# Patient Record
Sex: FEMALE | Race: WHITE | NOT HISPANIC OR LATINO | Employment: OTHER | URBAN - METROPOLITAN AREA
[De-identification: names, ages, dates, MRNs, and addresses within clinical notes are randomized per-mention and may not be internally consistent; named-entity substitution may affect disease eponyms.]

---

## 2017-06-20 ENCOUNTER — APPOINTMENT (OUTPATIENT)
Dept: PREADMISSION TESTING | Facility: HOSPITAL | Age: 82
End: 2017-06-20

## 2017-06-20 VITALS
DIASTOLIC BLOOD PRESSURE: 78 MMHG | BODY MASS INDEX: 30.4 KG/M2 | OXYGEN SATURATION: 96 % | HEART RATE: 74 BPM | SYSTOLIC BLOOD PRESSURE: 138 MMHG | TEMPERATURE: 98.4 F | RESPIRATION RATE: 16 BRPM | HEIGHT: 64 IN | WEIGHT: 178.1 LBS

## 2017-06-20 LAB
ANION GAP SERPL CALCULATED.3IONS-SCNC: 13.4 MMOL/L
BUN BLD-MCNC: 31 MG/DL (ref 8–23)
BUN/CREAT SERPL: 21.8 (ref 7–25)
CALCIUM SPEC-SCNC: 10.4 MG/DL (ref 8.6–10.5)
CHLORIDE SERPL-SCNC: 97 MMOL/L (ref 98–107)
CO2 SERPL-SCNC: 25.6 MMOL/L (ref 22–29)
CREAT BLD-MCNC: 1.42 MG/DL (ref 0.57–1)
DEPRECATED RDW RBC AUTO: 62.4 FL (ref 37–54)
ERYTHROCYTE [DISTWIDTH] IN BLOOD BY AUTOMATED COUNT: 20 % (ref 11.7–13)
GFR SERPL CREATININE-BSD FRML MDRD: 35 ML/MIN/1.73
GLUCOSE BLD-MCNC: 173 MG/DL (ref 65–99)
HCT VFR BLD AUTO: 39.5 % (ref 35.6–45.5)
HGB BLD-MCNC: 12.3 G/DL (ref 11.9–15.5)
MCH RBC QN AUTO: 26.7 PG (ref 26.9–32)
MCHC RBC AUTO-ENTMCNC: 31.1 G/DL (ref 32.4–36.3)
MCV RBC AUTO: 85.7 FL (ref 80.5–98.2)
PLATELET # BLD AUTO: 188 10*3/MM3 (ref 140–500)
PMV BLD AUTO: 9.1 FL (ref 6–12)
POTASSIUM BLD-SCNC: 4.9 MMOL/L (ref 3.5–5.2)
RBC # BLD AUTO: 4.61 10*6/MM3 (ref 3.9–5.2)
SODIUM BLD-SCNC: 136 MMOL/L (ref 136–145)
WBC NRBC COR # BLD: 6.03 10*3/MM3 (ref 4.5–10.7)

## 2017-06-20 PROCEDURE — 93010 ELECTROCARDIOGRAM REPORT: CPT | Performed by: INTERNAL MEDICINE

## 2017-06-20 PROCEDURE — 36415 COLL VENOUS BLD VENIPUNCTURE: CPT

## 2017-06-20 PROCEDURE — 85027 COMPLETE CBC AUTOMATED: CPT | Performed by: OPHTHALMOLOGY

## 2017-06-20 PROCEDURE — 93005 ELECTROCARDIOGRAM TRACING: CPT

## 2017-06-20 PROCEDURE — 80048 BASIC METABOLIC PNL TOTAL CA: CPT | Performed by: OPHTHALMOLOGY

## 2017-06-20 RX ORDER — LORAZEPAM 1 MG/1
1 TABLET ORAL NIGHTLY
Status: ON HOLD | COMMUNITY
End: 2021-12-15

## 2017-06-20 RX ORDER — ROSUVASTATIN CALCIUM 20 MG/1
20 TABLET, COATED ORAL NIGHTLY
Status: ON HOLD | COMMUNITY
End: 2021-12-15

## 2017-06-20 RX ORDER — GABAPENTIN 300 MG/1
300 CAPSULE ORAL 3 TIMES DAILY
COMMUNITY

## 2017-06-20 RX ORDER — LISINOPRIL 2.5 MG/1
2.5 TABLET ORAL DAILY
Status: ON HOLD | COMMUNITY
End: 2023-01-01

## 2017-06-20 RX ORDER — OMEPRAZOLE 40 MG/1
40 CAPSULE, DELAYED RELEASE ORAL DAILY
COMMUNITY
End: 2021-06-09 | Stop reason: SDUPTHER

## 2017-06-20 RX ORDER — CLOPIDOGREL BISULFATE 75 MG/1
75 TABLET ORAL DAILY
COMMUNITY
End: 2017-06-27 | Stop reason: HOSPADM

## 2017-06-20 RX ORDER — FUROSEMIDE 40 MG/1
40 TABLET ORAL DAILY PRN
COMMUNITY

## 2017-06-20 RX ORDER — METOPROLOL SUCCINATE 50 MG/1
50 TABLET, EXTENDED RELEASE ORAL NIGHTLY
Status: ON HOLD | COMMUNITY
End: 2021-12-15

## 2017-06-20 RX ORDER — ALBUTEROL SULFATE 2.5 MG/3ML
2.5 SOLUTION RESPIRATORY (INHALATION) EVERY 4 HOURS PRN
Status: ON HOLD | COMMUNITY
End: 2021-12-15

## 2017-06-20 RX ORDER — FOLIC ACID 1 MG/1
1 TABLET ORAL DAILY
COMMUNITY

## 2017-06-20 NOTE — DISCHARGE INSTRUCTIONS
Take the following medications the morning of surgery with a small sip of water: OMEPRAZOLE    ARRIVAL TIME  07:30        General Instructions:  • Do not eat solid food after midnight the night before surgery.  • You may drink clear liquids day of surgery but must stop at least one hour before your hospital arrival time.  • It is beneficial for you to have a clear drink that contains carbohydrates the day of surgery.  We suggest a 20 ounce bottle of Gatorade or Powerade for non-diabetic patients or a 20 ounce bottle of G2 or Powerade Zero for diabetic patients. (Pediatric patients, are not advised to drink a 20 ounce carbohydrate drink)    Clear liquids are liquids you can see through.  Nothing red in color.     Plain water                               Sports drinks  Sodas                                   Gelatin (Jell-O)  Fruit juices without pulp such as white grape juice and apple juice  Popsicles that contain no fruit or yogurt  Tea or coffee (no cream or milk added)  Gatorade / Powerade  G2 / Powerade Zero    • Do not smoke, use chewing tobacco or drink alcohol the day of surgery.   • Bring any papers given to you in the doctor’s office.  • Wear clean comfortable clothes and socks.  • Do not wear contact lenses or make-up.  Bring a case for your glasses.   • Leave all other valuables and jewelry at home.          Preventing a Surgical Site Infection:  • For 2 to 3 days before surgery, avoid shaving with a razor because the razor can irritate skin and make it easier to develop an infection.  • The night prior to surgery sleep in a clean bed with clean clothing.  Do not allow pets to sleep with you.  • Shower on the morning of surgery using a fresh bar of anti-bacterial soap (such as Dial) and clean washcloth.  Dry with a clean towel and dress in clean clothing.  • Ask your surgeon if you will be receiving antibiotics prior to surgery.  • Make sure you, your family, and all healthcare providers clean their  hands with soap and water or an alcohol based hand  before caring for you or your wound.    Day of surgery:  Upon arrival, a Pre-op nurse and Anesthesiologist will review your health history, obtain vital signs, and answer questions you may have.  The only belongings needed at this time will be your home medications and if applicable your C-PAP/BI-PAP machine.  If you are staying overnight your family can leave the rest of your belongings in the car and bring them to your room later.  A Pre-op nurse will start an IV and you may receive medication in preparation for surgery, including something to help you relax.  Your family will be able to see you in the Pre-op area.  While you are in surgery your family should notify the waiting room  if they leave the waiting room area and provide a contact phone number.    Please be aware that surgery does come with discomfort.  We want to make every effort to control your discomfort so please discuss any uncontrolled symptoms with your nurse.   Your doctor will most likely have prescribed pain medications.      If you are going home after surgery you will receive individualized written care instructions before being discharged.  A responsible adult must drive you to and from the hospital on the day of your surgery and stay with you for 24 hours.    If you are staying overnight following surgery, you will be transported to your hospital room following the recovery period.  [facility] has all private rooms.    If you have any questions please call Pre-Admission Testing at [phone].  Deductibles and co-payments are collected on the day of service. Please be prepared to pay the required co-pay, deductible or deposit on the day of service as defined by your plan.

## 2017-06-27 ENCOUNTER — ANESTHESIA EVENT (OUTPATIENT)
Dept: PERIOP | Facility: HOSPITAL | Age: 82
End: 2017-06-27

## 2017-06-27 ENCOUNTER — HOSPITAL ENCOUNTER (OUTPATIENT)
Facility: HOSPITAL | Age: 82
Setting detail: HOSPITAL OUTPATIENT SURGERY
Discharge: HOME | End: 2017-06-27
Attending: OPHTHALMOLOGY | Admitting: OPHTHALMOLOGY

## 2017-06-27 ENCOUNTER — ANESTHESIA (OUTPATIENT)
Dept: PERIOP | Facility: HOSPITAL | Age: 82
End: 2017-06-27

## 2017-06-27 VITALS
BODY MASS INDEX: 30.61 KG/M2 | WEIGHT: 178.31 LBS | TEMPERATURE: 96.9 F | RESPIRATION RATE: 16 BRPM | DIASTOLIC BLOOD PRESSURE: 52 MMHG | HEART RATE: 71 BPM | OXYGEN SATURATION: 96 % | SYSTOLIC BLOOD PRESSURE: 139 MMHG

## 2017-06-27 LAB
GLUCOSE BLDC GLUCOMTR-MCNC: 203 MG/DL (ref 70–130)
GLUCOSE BLDC GLUCOMTR-MCNC: 229 MG/DL (ref 70–130)

## 2017-06-27 PROCEDURE — 25010000002 MIDAZOLAM PER 1 MG: Performed by: ANESTHESIOLOGY

## 2017-06-27 PROCEDURE — 82962 GLUCOSE BLOOD TEST: CPT

## 2017-06-27 PROCEDURE — 25010000002 PROPOFOL 1000 MG/ML EMULSION: Performed by: NURSE ANESTHETIST, CERTIFIED REGISTERED

## 2017-06-27 PROCEDURE — 25010000002 PROPOFOL 10 MG/ML EMULSION: Performed by: NURSE ANESTHETIST, CERTIFIED REGISTERED

## 2017-06-27 RX ORDER — HYDRALAZINE HYDROCHLORIDE 20 MG/ML
5 INJECTION INTRAMUSCULAR; INTRAVENOUS
Status: DISCONTINUED | OUTPATIENT
Start: 2017-06-27 | End: 2017-06-27 | Stop reason: HOSPADM

## 2017-06-27 RX ORDER — SODIUM CHLORIDE, SODIUM LACTATE, POTASSIUM CHLORIDE, CALCIUM CHLORIDE 600; 310; 30; 20 MG/100ML; MG/100ML; MG/100ML; MG/100ML
100 INJECTION, SOLUTION INTRAVENOUS CONTINUOUS
Status: DISCONTINUED | OUTPATIENT
Start: 2017-06-27 | End: 2017-06-27 | Stop reason: HOSPADM

## 2017-06-27 RX ORDER — SODIUM CHLORIDE 0.9 % (FLUSH) 0.9 %
1-10 SYRINGE (ML) INJECTION AS NEEDED
Status: DISCONTINUED | OUTPATIENT
Start: 2017-06-27 | End: 2017-06-27 | Stop reason: HOSPADM

## 2017-06-27 RX ORDER — PROMETHAZINE HYDROCHLORIDE 25 MG/1
12.5 TABLET ORAL ONCE AS NEEDED
Status: DISCONTINUED | OUTPATIENT
Start: 2017-06-27 | End: 2017-06-27 | Stop reason: HOSPADM

## 2017-06-27 RX ORDER — HYDROCODONE BITARTRATE AND ACETAMINOPHEN 7.5; 325 MG/1; MG/1
1 TABLET ORAL ONCE AS NEEDED
Status: COMPLETED | OUTPATIENT
Start: 2017-06-27 | End: 2017-06-27

## 2017-06-27 RX ORDER — DIPHENHYDRAMINE HYDROCHLORIDE 50 MG/ML
12.5 INJECTION INTRAMUSCULAR; INTRAVENOUS
Status: DISCONTINUED | OUTPATIENT
Start: 2017-06-27 | End: 2017-06-27 | Stop reason: HOSPADM

## 2017-06-27 RX ORDER — NALOXONE HCL 0.4 MG/ML
0.2 VIAL (ML) INJECTION AS NEEDED
Status: DISCONTINUED | OUTPATIENT
Start: 2017-06-27 | End: 2017-06-27 | Stop reason: HOSPADM

## 2017-06-27 RX ORDER — FENTANYL CITRATE 50 UG/ML
50 INJECTION, SOLUTION INTRAMUSCULAR; INTRAVENOUS
Status: DISCONTINUED | OUTPATIENT
Start: 2017-06-27 | End: 2017-06-27 | Stop reason: HOSPADM

## 2017-06-27 RX ORDER — FLUMAZENIL 0.1 MG/ML
0.2 INJECTION INTRAVENOUS AS NEEDED
Status: DISCONTINUED | OUTPATIENT
Start: 2017-06-27 | End: 2017-06-27 | Stop reason: HOSPADM

## 2017-06-27 RX ORDER — ONDANSETRON 2 MG/ML
4 INJECTION INTRAMUSCULAR; INTRAVENOUS ONCE AS NEEDED
Status: DISCONTINUED | OUTPATIENT
Start: 2017-06-27 | End: 2017-06-27 | Stop reason: HOSPADM

## 2017-06-27 RX ORDER — MIDAZOLAM HYDROCHLORIDE 1 MG/ML
1 INJECTION INTRAMUSCULAR; INTRAVENOUS
Status: DISCONTINUED | OUTPATIENT
Start: 2017-06-27 | End: 2017-06-27 | Stop reason: HOSPADM

## 2017-06-27 RX ORDER — ERYTHROMYCIN 5 MG/G
OINTMENT OPHTHALMIC AS NEEDED
Status: DISCONTINUED | OUTPATIENT
Start: 2017-06-27 | End: 2017-06-27 | Stop reason: HOSPADM

## 2017-06-27 RX ORDER — PROMETHAZINE HYDROCHLORIDE 25 MG/1
25 TABLET ORAL ONCE AS NEEDED
Status: DISCONTINUED | OUTPATIENT
Start: 2017-06-27 | End: 2017-06-27 | Stop reason: HOSPADM

## 2017-06-27 RX ORDER — ERYTHROMYCIN 5 MG/G
OINTMENT OPHTHALMIC
Qty: 3.5 G | Refills: 1 | Status: ON HOLD | OUTPATIENT
Start: 2017-06-27 | End: 2021-12-15

## 2017-06-27 RX ORDER — OXYCODONE AND ACETAMINOPHEN 7.5; 325 MG/1; MG/1
1 TABLET ORAL ONCE AS NEEDED
Status: DISCONTINUED | OUTPATIENT
Start: 2017-06-27 | End: 2017-06-27 | Stop reason: HOSPADM

## 2017-06-27 RX ORDER — SODIUM CHLORIDE, SODIUM LACTATE, POTASSIUM CHLORIDE, CALCIUM CHLORIDE 600; 310; 30; 20 MG/100ML; MG/100ML; MG/100ML; MG/100ML
9 INJECTION, SOLUTION INTRAVENOUS CONTINUOUS
Status: DISCONTINUED | OUTPATIENT
Start: 2017-06-27 | End: 2017-06-27 | Stop reason: HOSPADM

## 2017-06-27 RX ORDER — HYDROMORPHONE HYDROCHLORIDE 1 MG/ML
0.5 INJECTION, SOLUTION INTRAMUSCULAR; INTRAVENOUS; SUBCUTANEOUS
Status: DISCONTINUED | OUTPATIENT
Start: 2017-06-27 | End: 2017-06-27 | Stop reason: HOSPADM

## 2017-06-27 RX ORDER — ALBUTEROL SULFATE 2.5 MG/3ML
2.5 SOLUTION RESPIRATORY (INHALATION) ONCE AS NEEDED
Status: DISCONTINUED | OUTPATIENT
Start: 2017-06-27 | End: 2017-06-27 | Stop reason: HOSPADM

## 2017-06-27 RX ORDER — PROMETHAZINE HYDROCHLORIDE 25 MG/ML
5 INJECTION, SOLUTION INTRAMUSCULAR; INTRAVENOUS
Status: DISCONTINUED | OUTPATIENT
Start: 2017-06-27 | End: 2017-06-27 | Stop reason: HOSPADM

## 2017-06-27 RX ORDER — FAMOTIDINE 10 MG/ML
20 INJECTION, SOLUTION INTRAVENOUS ONCE
Status: COMPLETED | OUTPATIENT
Start: 2017-06-27 | End: 2017-06-27

## 2017-06-27 RX ORDER — HYDROCODONE BITARTRATE AND ACETAMINOPHEN 5; 325 MG/1; MG/1
1 TABLET ORAL EVERY 6 HOURS PRN
Qty: 15 TABLET | Refills: 0 | Status: SHIPPED | OUTPATIENT
Start: 2017-06-27 | End: 2017-09-06

## 2017-06-27 RX ORDER — PROMETHAZINE HYDROCHLORIDE 25 MG/ML
12.5 INJECTION, SOLUTION INTRAMUSCULAR; INTRAVENOUS ONCE AS NEEDED
Status: DISCONTINUED | OUTPATIENT
Start: 2017-06-27 | End: 2017-06-27 | Stop reason: HOSPADM

## 2017-06-27 RX ORDER — EPHEDRINE SULFATE 50 MG/ML
5 INJECTION, SOLUTION INTRAVENOUS ONCE AS NEEDED
Status: DISCONTINUED | OUTPATIENT
Start: 2017-06-27 | End: 2017-06-27 | Stop reason: HOSPADM

## 2017-06-27 RX ORDER — LIDOCAINE HYDROCHLORIDE 20 MG/ML
INJECTION, SOLUTION INFILTRATION; PERINEURAL AS NEEDED
Status: DISCONTINUED | OUTPATIENT
Start: 2017-06-27 | End: 2017-06-27 | Stop reason: SURG

## 2017-06-27 RX ORDER — MIDAZOLAM HYDROCHLORIDE 1 MG/ML
2 INJECTION INTRAMUSCULAR; INTRAVENOUS
Status: DISCONTINUED | OUTPATIENT
Start: 2017-06-27 | End: 2017-06-27 | Stop reason: HOSPADM

## 2017-06-27 RX ORDER — LABETALOL HYDROCHLORIDE 5 MG/ML
5 INJECTION, SOLUTION INTRAVENOUS
Status: DISCONTINUED | OUTPATIENT
Start: 2017-06-27 | End: 2017-06-27 | Stop reason: HOSPADM

## 2017-06-27 RX ORDER — PROPOFOL 10 MG/ML
VIAL (ML) INTRAVENOUS AS NEEDED
Status: DISCONTINUED | OUTPATIENT
Start: 2017-06-27 | End: 2017-06-27 | Stop reason: SURG

## 2017-06-27 RX ORDER — PROMETHAZINE HYDROCHLORIDE 25 MG/1
25 SUPPOSITORY RECTAL ONCE AS NEEDED
Status: DISCONTINUED | OUTPATIENT
Start: 2017-06-27 | End: 2017-06-27 | Stop reason: HOSPADM

## 2017-06-27 RX ADMIN — FAMOTIDINE 20 MG: 10 INJECTION, SOLUTION INTRAVENOUS at 08:00

## 2017-06-27 RX ADMIN — MIDAZOLAM 1 MG: 1 INJECTION INTRAMUSCULAR; INTRAVENOUS at 08:01

## 2017-06-27 RX ADMIN — HYDROCODONE BITARTRATE AND ACETAMINOPHEN 1 TABLET: 7.5; 325 TABLET ORAL at 10:42

## 2017-06-27 RX ADMIN — PROPOFOL 100 MG: 10 INJECTION, EMULSION INTRAVENOUS at 08:37

## 2017-06-27 RX ADMIN — PROPOFOL 200 MCG/KG/MIN: 10 INJECTION, EMULSION INTRAVENOUS at 08:38

## 2017-06-27 RX ADMIN — LIDOCAINE HYDROCHLORIDE 40 MG: 20 INJECTION, SOLUTION INFILTRATION; PERINEURAL at 08:37

## 2017-06-27 RX ADMIN — SODIUM CHLORIDE, POTASSIUM CHLORIDE, SODIUM LACTATE AND CALCIUM CHLORIDE 9 ML/HR: 600; 310; 30; 20 INJECTION, SOLUTION INTRAVENOUS at 07:46

## 2017-06-27 NOTE — H&P
History & Physical       Patient: Laury Palacios    Date of Admission: 6/27/2017  7:14 AM    YOB: 1930    Medical Record Number: [id number]      Chief Complaints: Left lower eyelid cicatricial ectropion      History of Present Illness: 87 y.o. female presents with Left lower eyelid cicatricial ectropion. No new meds/health problems since office visit      Allergies:   Allergies   Allergen Reactions   • Ciprofloxacin Rash       10 point review of systems negative, except pertaining to the HPI    Medications:   Home Medications:  No current facility-administered medications on file prior to encounter.      No current outpatient prescriptions on file prior to encounter.     Current Medications:  Scheduled Meds:   Continuous Infusions:  lactated ringers 9 mL/hr Last Rate: 9 mL/hr (06/27/17 0746)     PRN Meds:.•  fentanyl  •  midazolam **OR** midazolam  •  sodium chloride    Past Medical History:   Diagnosis Date   • Acid reflux    • Arthritis    • Asthma    • Chronic renal failure    • DDD (degenerative disc disease), lumbar    • Diabetes mellitus    • Ectropion of left eye    • Hyperlipidemia    • Hypertension    • Long-term current use of insulin for diabetes mellitus    • Melanoma    • Peripheral neuropathy    • Spinal stenosis    • TIA (transient ischemic attack)         Past Surgical History:   Procedure Laterality Date   • CATARACT EXTRACTION     • CHOLECYSTECTOMY          Social History     Occupational History   • Not on file.     Social History Main Topics   • Smoking status: Former Smoker   • Smokeless tobacco: Never Used      Comment: QUIT 25 YEARS AGO   • Alcohol use No   • Drug use: No   • Sexual activity: Not on file    Social History     Social History Narrative        Family History   Problem Relation Age of Onset   • Malig Hyperthermia Neg Hx            Physical Exam   Constitutional: Alert, cooperative, in no acute distress    Head: Normocephalic.   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   Cardiovascular: Normal rate.    Pulmonary/Chest: Effort normal.   Neurological: Alert.   Skin: Skin is warm.   Psychiatric: Normal mood and affect.       Assessment/Plan:  The patient voiced understanding of the risks, benefits, and alternative forms of treatment that were discussed and the patient consents to proceed with LEFT LOWER LID CICATRICIAL ECTROPION  FULL THICKNESS SKIN GRAFT WITH FROST SUTURE.       Prabhakar Hurd MD

## 2017-06-27 NOTE — ANESTHESIA PROCEDURE NOTES
Airway  Urgency: elective    Airway not difficult    General Information and Staff    Patient location during procedure: OR  Anesthesiologist: EREN GRAY  CRNA: NAV PARTIDA    Indications and Patient Condition  Indications for airway management: airway protection    Preoxygenated: yes  MILS maintained throughout  Mask difficulty assessment: 0 - not attempted    Final Airway Details  Final airway type: supraglottic airway      Successful airway: classic      Number of attempts at approach: 1    Additional Comments  Placed with ease. Vent with ease. Teeth as pre-op. Secured to face.

## 2017-06-27 NOTE — PLAN OF CARE
Problem: Patient Care Overview (Adult)  Goal: Plan of Care Review  Outcome: Ongoing (interventions implemented as appropriate)    06/27/17 1033   Coping/Psychosocial Response Interventions   Plan Of Care Reviewed With patient   Patient Care Overview   Progress improving       Goal: Adult Individualization and Mutuality  Outcome: Ongoing (interventions implemented as appropriate)  Goal: Discharge Needs Assessment  Outcome: Ongoing (interventions implemented as appropriate)    06/27/17 1033   Discharge Needs Assessment   Concerns To Be Addressed no discharge needs identified

## 2017-06-27 NOTE — ANESTHESIA PREPROCEDURE EVALUATION
Anesthesia Evaluation     Patient summary reviewed and Nursing notes reviewed   NPO Solid Status: > 8 hours  NPO Liquid Status: > 2 hours     Airway   Mallampati: II  no difficulty expected  Dental    (+) edentulous and upper dentures    Pulmonary     breath sounds clear to auscultation  (+) a smoker, asthma,   Cardiovascular     ECG reviewed  Rhythm: regular  Rate: normal    (+) hypertension, hyperlipidemia      Neuro/Psych  (+) TIA, numbness,    GI/Hepatic/Renal/Endo    (+)  GERD, renal disease, diabetes mellitus type 1,     Musculoskeletal     (+) back pain,   Abdominal    Substance History - negative use     OB/GYN negative ob/gyn ROS         Other   (+) arthritis   history of cancer                                Anesthesia Plan    ASA 3     general     intravenous induction   Anesthetic plan and risks discussed with patient.    Plan discussed with CRNA.    BP up-give Lisinopril this am

## 2017-06-27 NOTE — OP NOTE
OPERATIVE NOTE    Patient Identification:  Name: Laury Palacios  Age: 87 y.o.  Sex: female  :  1930  MRN: [id number]                                               Preoperative diagnosis: Left lower lid cicatricial ectropion   Postoperative diagnosis: same  Procedure: Left lower lid cicatricial ectropion repair with full thickness skin graft and frost suture  Surgeon: Dr. Heath Patterson  who was present and scrubbed throughout all critical portions of the operation  Assistants: Prabhakar Hurd MD  Anesthesia: General  EBL: less than 50cc    Description of the procedure:     The patient was taken to the operating room and placed on the table in the supine position, where anesthesia was induced. 2% lidocaine with epinephrine and 0.5% marcaine in a 1:1 fashion was injected over the surgical site, and the patient was prepped and draped in the usual manner for orbitofacial surgery.     Corneal protectors were placed in both eyes.     A 15 Bard-Delfino blade incision was made at the Left lateral canthus, and sharp dissection was carried to the lateral orbital rim. A second incision was made 2 mm below the eyelash margin, across the lateral 2/3 of the lower eyelid. The prior skin graft with cicatricial tissue and multiple inclusion cysts was excised. The excision allowed the lower lid to achieve an improved position. The lower lid was noted to have moderate laxity. The lower lid was advanced superiorly and temporally, and the inferior ramus of the lateral canthal tendon was identified and severed with sharp dissection. A full-thickness en bloc excision of the lateral aspect of the eyelid margin was carried out with sharp dissection. The cut edge of the tarsal plate was anchored to the lateral orbital rim periosteum with a 5-0 vicryl suture.     A full-thickness skin shortage of the lower eyelid was observed. A full-thickness skin graft was then harvested from  The right upper eyelid via a blepharoplasty skin excision  with care to ensure that the amount excised was appropriate to prevent lagophthalmos on the right. The donor site was closed with 5-0 fast absorbing suture. A 4-0 frost suture was placed through the left lower lid and held on traction superiorly. The full-thickness skin graft was cut to fit the lower eyelid defect with a slight overcorrection to allow for graft contraction. The graft was sutured around the entire perimeter of the graft with 5-0 fast absorbing suture.     The corneal protectors were removed and antibiotic ophthalmic ointment was placed over the surgical site. The eyelid margin was held superiorly with a double-armed 4-0 Silk through the lower and upper eyelid and  anchored to the forehead to provide continuous upward traction on the eyelid.  A moderate pressure eyepad dressing was applied to the skin graft.    The patient was then awakened and taken from the operating room in good condition, having tolerated the procedure well. There were no complications, and the estimated blood loss was less than 50 cc.

## 2017-06-27 NOTE — ANESTHESIA POSTPROCEDURE EVALUATION
Patient: Laury Palacios    Procedure Summary     Date Anesthesia Start Anesthesia Stop Room / Location    06/27/17 0830 0954  [facility]  /  [facility]       Procedure Diagnosis Surgeon Provider    LEFT LOWER LID CICATRICIAL ECTROPION  FULL THICKNESS SKIN GRAFT WITH FROST SUTURE (Left Eye) No diagnosis on file. MD Alonso Dick MD          Anesthesia Type: general  Last vitals  /46 (06/27/17 1045)    Temp 36.1 °C (96.9 °F) (06/27/17 1030)    Pulse 78 (06/27/17 1045)   Resp 16 (06/27/17 1045)    SpO2 94 % (06/27/17 1030)      Post Anesthesia Care and Evaluation    Patient location during evaluation: bedside  Patient participation: complete - patient participated  Level of consciousness: awake  Pain management: adequate  Airway patency: patent  Anesthetic complications: No anesthetic complications    Cardiovascular status: acceptable  Respiratory status: acceptable  Hydration status: acceptable

## 2017-06-27 NOTE — PLAN OF CARE
Problem: Perioperative Period (Adult)  Goal: Signs and Symptoms of Listed Potential Problems Will be Absent or Manageable (Perioperative Period)  Outcome: Ongoing (interventions implemented as appropriate)    06/27/17 1033   Perioperative Period   Problems Assessed (Perioperative Period) pain;hemorrhage;hypothermia;hypoxia/hypoxemia   Problems Present (Perioperative Period) none

## 2017-08-31 ENCOUNTER — CONVERSION ENCOUNTER (OUTPATIENT)
Dept: MAMMOGRAPHY | Facility: HOSPITAL | Age: 82
End: 2017-08-31

## 2017-09-06 ENCOUNTER — APPOINTMENT (OUTPATIENT)
Dept: PREADMISSION TESTING | Facility: HOSPITAL | Age: 82
End: 2017-09-06

## 2017-09-06 VITALS
SYSTOLIC BLOOD PRESSURE: 134 MMHG | DIASTOLIC BLOOD PRESSURE: 65 MMHG | HEIGHT: 65 IN | WEIGHT: 176.8 LBS | OXYGEN SATURATION: 100 % | BODY MASS INDEX: 29.46 KG/M2 | TEMPERATURE: 98.2 F | HEART RATE: 82 BPM | RESPIRATION RATE: 20 BRPM

## 2017-09-06 LAB
ANION GAP SERPL CALCULATED.3IONS-SCNC: 14.4 MMOL/L
BUN BLD-MCNC: 32 MG/DL (ref 8–23)
BUN/CREAT SERPL: 18.9 (ref 7–25)
CALCIUM SPEC-SCNC: 9.6 MG/DL (ref 8.6–10.5)
CHLORIDE SERPL-SCNC: 98 MMOL/L (ref 98–107)
CO2 SERPL-SCNC: 26.6 MMOL/L (ref 22–29)
CREAT BLD-MCNC: 1.69 MG/DL (ref 0.57–1)
DEPRECATED RDW RBC AUTO: 50.2 FL (ref 37–54)
ERYTHROCYTE [DISTWIDTH] IN BLOOD BY AUTOMATED COUNT: 15.8 % (ref 11.7–13)
GFR SERPL CREATININE-BSD FRML MDRD: 29 ML/MIN/1.73
GLUCOSE BLD-MCNC: 243 MG/DL (ref 65–99)
HCT VFR BLD AUTO: 37.9 % (ref 35.6–45.5)
HGB BLD-MCNC: 12 G/DL (ref 11.9–15.5)
MCH RBC QN AUTO: 28.3 PG (ref 26.9–32)
MCHC RBC AUTO-ENTMCNC: 31.7 G/DL (ref 32.4–36.3)
MCV RBC AUTO: 89.4 FL (ref 80.5–98.2)
PLATELET # BLD AUTO: 175 10*3/MM3 (ref 140–500)
PMV BLD AUTO: 9.1 FL (ref 6–12)
POTASSIUM BLD-SCNC: 4.5 MMOL/L (ref 3.5–5.2)
RBC # BLD AUTO: 4.24 10*6/MM3 (ref 3.9–5.2)
SODIUM BLD-SCNC: 139 MMOL/L (ref 136–145)
WBC NRBC COR # BLD: 6.28 10*3/MM3 (ref 4.5–10.7)

## 2017-09-06 PROCEDURE — 80048 BASIC METABOLIC PNL TOTAL CA: CPT | Performed by: OPHTHALMOLOGY

## 2017-09-06 PROCEDURE — 36415 COLL VENOUS BLD VENIPUNCTURE: CPT

## 2017-09-06 PROCEDURE — 85027 COMPLETE CBC AUTOMATED: CPT | Performed by: OPHTHALMOLOGY

## 2017-09-06 RX ORDER — ALBUTEROL SULFATE 90 UG/1
2 AEROSOL, METERED RESPIRATORY (INHALATION) EVERY 4 HOURS PRN
Status: ON HOLD | COMMUNITY
End: 2021-12-15

## 2017-09-06 RX ORDER — CLOPIDOGREL BISULFATE 75 MG/1
75 TABLET ORAL DAILY
COMMUNITY
End: 2017-09-12 | Stop reason: HOSPADM

## 2017-09-06 RX ORDER — FEXOFENADINE HYDROCHLORIDE 60 MG/1
60 TABLET, FILM COATED ORAL DAILY PRN
Status: ON HOLD | COMMUNITY
End: 2021-12-15

## 2017-09-06 NOTE — DISCHARGE INSTRUCTIONS
Take the following medications the morning of surgery with a small sip of water: VENTOLIN AND OMEPRAZOLE.  STOP PREOPERATIVELY ANY ANTIINFLAMMATORY, ALEVE, PLAVIX PER ORDERS.  ARRIVE TO THE OUTPATIENT SURGERY DESK THE DAY OF YOUR SURGERY BY 8AM.    CALL YOUR FAMILY DOCTOR FOR ANY SPECIAL INSTRUCTIONS REGARDING YOUR INSULIN DOSES PREOP  BRING IN YOUR LISINOPRIL BOTTLE TO THE SURGERY NURSE THE DAY OF YOUR SURGERY AND SHE WILL DECIDE IF YOU NEED TO TAKE IT THAT MORNING OR NOT.    General Instructions:  • Do not eat solid food after midnight the night before surgery.  • IF YOUR PAPERWORK ALLOWS: You may drink clear liquids day of surgery but must stop at least one hour before your hospital arrival time (LAST DRINK BY 7AM).  • It is beneficial for you to have a clear drink that contains carbohydrates the day of surgery.  We suggest a 20 ounce bottle of Gatorade or Powerade for non-diabetic patients or a 20 ounce bottle of G2 or Powerade Zero for diabetic patients. (Pediatric patients, are not advised to drink a 20 ounce carbohydrate drink)    Clear liquids are liquids you can see through.  Nothing red in color.     Plain water                               Sports drinks  Sodas                                   Gelatin (Jell-O)  Fruit juices without pulp such as white grape juice and apple juice  Popsicles that contain no fruit or yogurt  Tea or coffee (no cream or milk added)  Gatorade / Powerade  G2 / Powerade Zero    • Infants may have breast milk up to four hours before surgery.  • Infants drinking formula may drink formula up to six hours before surgery.   • Patients who avoid smoking, chewing tobacco and alcohol for 4 weeks prior to surgery have a reduced risk of post-operative complications.  Quit smoking as many days before surgery as you can.  • Do not smoke, use chewing tobacco or drink alcohol the day of surgery.   • If applicable bring your C-PAP/ BI-PAP machine.  • Bring any papers given to you in the  doctor’s office.  • Wear clean comfortable clothes and socks.  • Do not wear contact lenses or make-up.  Bring a case for your glasses.   • Bring crutches or walker if applicable.  • Leave all other valuables and jewelry at home.  • The Pre-Admission Testing nurse will instruct you to bring medications if unable to obtain an accurate list in Pre-Admission Testing.        If you were given a blood bank ID arm band remember to bring it with you the day of surgery.    Preventing a Surgical Site Infection:  • For 2 to 3 days before surgery, avoid shaving with a razor because the razor can irritate skin and make it easier to develop an infection.  • The night prior to surgery sleep in a clean bed with clean clothing.  Do not allow pets to sleep with you.  • Shower on the morning of surgery using a fresh bar of anti-bacterial soap (such as Dial) and clean washcloth.  Dry with a clean towel and dress in clean clothing.  • Ask your surgeon if you will be receiving antibiotics prior to surgery.  • Make sure you, your family, and all healthcare providers clean their hands with soap and water or an alcohol based hand  before caring for you or your wound.    Day of surgery:  Upon arrival, a Pre-op nurse and Anesthesiologist will review your health history, obtain vital signs, and answer questions you may have.  The only belongings needed at this time will be your home medications and if applicable your C-PAP/BI-PAP machine.  If you are staying overnight your family can leave the rest of your belongings in the car and bring them to your room later.  A Pre-op nurse will start an IV and you may receive medication in preparation for surgery, including something to help you relax.  Your family will be able to see you in the Pre-op area.  While you are in surgery your family should notify the waiting room  if they leave the waiting room area and provide a contact phone number.    Please be aware that surgery does  come with discomfort.  We want to make every effort to control your discomfort so please discuss any uncontrolled symptoms with your nurse.   Your doctor will most likely have prescribed pain medications.      If you are going home after surgery you will receive individualized written care instructions before being discharged.  A responsible adult must drive you to and from the hospital on the day of your surgery and stay with you for 24 hours.    If you are staying overnight following surgery, you will be transported to your hospital room following the recovery period.  [facility] has all private rooms.    If you have any questions please call Pre-Admission Testing at [phone].  Deductibles and co-payments are collected on the day of service. Please be prepared to pay the required co-pay, deductible or deposit on the day of service as defined by your plan.

## 2017-09-12 ENCOUNTER — ANESTHESIA (OUTPATIENT)
Dept: PERIOP | Facility: HOSPITAL | Age: 82
End: 2017-09-12

## 2017-09-12 ENCOUNTER — ANESTHESIA EVENT (OUTPATIENT)
Dept: PERIOP | Facility: HOSPITAL | Age: 82
End: 2017-09-12

## 2017-09-12 ENCOUNTER — HOSPITAL ENCOUNTER (OUTPATIENT)
Facility: HOSPITAL | Age: 82
Setting detail: HOSPITAL OUTPATIENT SURGERY
Discharge: HOME | End: 2017-09-12
Attending: OPHTHALMOLOGY | Admitting: OPHTHALMOLOGY

## 2017-09-12 VITALS
RESPIRATION RATE: 18 BRPM | OXYGEN SATURATION: 96 % | SYSTOLIC BLOOD PRESSURE: 152 MMHG | HEART RATE: 77 BPM | DIASTOLIC BLOOD PRESSURE: 68 MMHG | TEMPERATURE: 98 F

## 2017-09-12 DIAGNOSIS — L98.9 SKIN LESION OF CHEEK: ICD-10-CM

## 2017-09-12 LAB
GLUCOSE BLDC GLUCOMTR-MCNC: 252 MG/DL (ref 70–130)
GLUCOSE BLDC GLUCOMTR-MCNC: 288 MG/DL (ref 70–130)

## 2017-09-12 PROCEDURE — 25010000002 PROPOFOL 10 MG/ML EMULSION: Performed by: NURSE ANESTHETIST, CERTIFIED REGISTERED

## 2017-09-12 PROCEDURE — 25010000002 SUCCINYLCHOLINE PER 20 MG: Performed by: NURSE ANESTHETIST, CERTIFIED REGISTERED

## 2017-09-12 PROCEDURE — 88305 TISSUE EXAM BY PATHOLOGIST: CPT | Performed by: OPHTHALMOLOGY

## 2017-09-12 PROCEDURE — 25010000002 FENTANYL CITRATE (PF) 100 MCG/2ML SOLUTION: Performed by: NURSE ANESTHETIST, CERTIFIED REGISTERED

## 2017-09-12 PROCEDURE — 88331 PATH CONSLTJ SURG 1 BLK 1SPC: CPT | Performed by: OPHTHALMOLOGY

## 2017-09-12 PROCEDURE — 25010000002 MIDAZOLAM PER 1 MG: Performed by: ANESTHESIOLOGY

## 2017-09-12 PROCEDURE — 25010000002 PHENYLEPHRINE PER 1 ML: Performed by: NURSE ANESTHETIST, CERTIFIED REGISTERED

## 2017-09-12 PROCEDURE — 82962 GLUCOSE BLOOD TEST: CPT

## 2017-09-12 RX ORDER — ROCURONIUM BROMIDE 10 MG/ML
INJECTION, SOLUTION INTRAVENOUS AS NEEDED
Status: DISCONTINUED | OUTPATIENT
Start: 2017-09-12 | End: 2017-09-12 | Stop reason: SURG

## 2017-09-12 RX ORDER — FAMOTIDINE 10 MG/ML
20 INJECTION, SOLUTION INTRAVENOUS ONCE
Status: COMPLETED | OUTPATIENT
Start: 2017-09-12 | End: 2017-09-12

## 2017-09-12 RX ORDER — FENTANYL CITRATE 50 UG/ML
50 INJECTION, SOLUTION INTRAMUSCULAR; INTRAVENOUS
Status: DISCONTINUED | OUTPATIENT
Start: 2017-09-12 | End: 2017-09-12 | Stop reason: HOSPADM

## 2017-09-12 RX ORDER — EPHEDRINE SULFATE 50 MG/ML
5 INJECTION, SOLUTION INTRAVENOUS ONCE AS NEEDED
Status: DISCONTINUED | OUTPATIENT
Start: 2017-09-12 | End: 2017-09-12 | Stop reason: HOSPADM

## 2017-09-12 RX ORDER — HYDROCODONE BITARTRATE AND ACETAMINOPHEN 5; 325 MG/1; MG/1
1 TABLET ORAL EVERY 6 HOURS PRN
Qty: 15 TABLET | Refills: 0 | Status: ON HOLD | OUTPATIENT
Start: 2017-09-12 | End: 2021-12-15

## 2017-09-12 RX ORDER — PROPOFOL 10 MG/ML
VIAL (ML) INTRAVENOUS AS NEEDED
Status: DISCONTINUED | OUTPATIENT
Start: 2017-09-12 | End: 2017-09-12 | Stop reason: SURG

## 2017-09-12 RX ORDER — PROMETHAZINE HYDROCHLORIDE 25 MG/ML
5 INJECTION, SOLUTION INTRAMUSCULAR; INTRAVENOUS 3 TIMES DAILY PRN
Status: DISCONTINUED | OUTPATIENT
Start: 2017-09-12 | End: 2017-09-12 | Stop reason: HOSPADM

## 2017-09-12 RX ORDER — ONDANSETRON 2 MG/ML
4 INJECTION INTRAMUSCULAR; INTRAVENOUS ONCE AS NEEDED
Status: DISCONTINUED | OUTPATIENT
Start: 2017-09-12 | End: 2017-09-12 | Stop reason: HOSPADM

## 2017-09-12 RX ORDER — SODIUM CHLORIDE 0.9 % (FLUSH) 0.9 %
1-10 SYRINGE (ML) INJECTION AS NEEDED
Status: DISCONTINUED | OUTPATIENT
Start: 2017-09-12 | End: 2017-09-12 | Stop reason: HOSPADM

## 2017-09-12 RX ORDER — NALOXONE HCL 0.4 MG/ML
0.2 VIAL (ML) INJECTION AS NEEDED
Status: DISCONTINUED | OUTPATIENT
Start: 2017-09-12 | End: 2017-09-12 | Stop reason: HOSPADM

## 2017-09-12 RX ORDER — HYDRALAZINE HYDROCHLORIDE 20 MG/ML
5 INJECTION INTRAMUSCULAR; INTRAVENOUS
Status: DISCONTINUED | OUTPATIENT
Start: 2017-09-12 | End: 2017-09-12 | Stop reason: HOSPADM

## 2017-09-12 RX ORDER — HYDROCODONE BITARTRATE AND ACETAMINOPHEN 7.5; 325 MG/1; MG/1
1 TABLET ORAL ONCE AS NEEDED
Status: COMPLETED | OUTPATIENT
Start: 2017-09-12 | End: 2017-09-12

## 2017-09-12 RX ORDER — SUCCINYLCHOLINE CHLORIDE 20 MG/ML
INJECTION INTRAMUSCULAR; INTRAVENOUS AS NEEDED
Status: DISCONTINUED | OUTPATIENT
Start: 2017-09-12 | End: 2017-09-12 | Stop reason: SURG

## 2017-09-12 RX ORDER — PROMETHAZINE HYDROCHLORIDE 25 MG/1
25 SUPPOSITORY RECTAL ONCE AS NEEDED
Status: DISCONTINUED | OUTPATIENT
Start: 2017-09-12 | End: 2017-09-12 | Stop reason: HOSPADM

## 2017-09-12 RX ORDER — LIDOCAINE HYDROCHLORIDE 20 MG/ML
INJECTION, SOLUTION INFILTRATION; PERINEURAL AS NEEDED
Status: DISCONTINUED | OUTPATIENT
Start: 2017-09-12 | End: 2017-09-12 | Stop reason: SURG

## 2017-09-12 RX ORDER — FENTANYL CITRATE 50 UG/ML
INJECTION, SOLUTION INTRAMUSCULAR; INTRAVENOUS AS NEEDED
Status: DISCONTINUED | OUTPATIENT
Start: 2017-09-12 | End: 2017-09-12 | Stop reason: SURG

## 2017-09-12 RX ORDER — MAGNESIUM HYDROXIDE 1200 MG/15ML
LIQUID ORAL AS NEEDED
Status: DISCONTINUED | OUTPATIENT
Start: 2017-09-12 | End: 2017-09-12 | Stop reason: HOSPADM

## 2017-09-12 RX ORDER — LABETALOL HYDROCHLORIDE 5 MG/ML
5 INJECTION, SOLUTION INTRAVENOUS
Status: DISCONTINUED | OUTPATIENT
Start: 2017-09-12 | End: 2017-09-12 | Stop reason: HOSPADM

## 2017-09-12 RX ORDER — MIDAZOLAM HYDROCHLORIDE 1 MG/ML
2 INJECTION INTRAMUSCULAR; INTRAVENOUS
Status: DISCONTINUED | OUTPATIENT
Start: 2017-09-12 | End: 2017-09-12 | Stop reason: HOSPADM

## 2017-09-12 RX ORDER — HYDROMORPHONE HYDROCHLORIDE 1 MG/ML
0.5 INJECTION, SOLUTION INTRAMUSCULAR; INTRAVENOUS; SUBCUTANEOUS
Status: DISCONTINUED | OUTPATIENT
Start: 2017-09-12 | End: 2017-09-12 | Stop reason: HOSPADM

## 2017-09-12 RX ORDER — ERYTHROMYCIN 5 MG/G
OINTMENT OPHTHALMIC AS NEEDED
Status: DISCONTINUED | OUTPATIENT
Start: 2017-09-12 | End: 2017-09-12 | Stop reason: HOSPADM

## 2017-09-12 RX ORDER — SODIUM CHLORIDE, SODIUM LACTATE, POTASSIUM CHLORIDE, CALCIUM CHLORIDE 600; 310; 30; 20 MG/100ML; MG/100ML; MG/100ML; MG/100ML
INJECTION, SOLUTION INTRAVENOUS CONTINUOUS PRN
Status: DISCONTINUED | OUTPATIENT
Start: 2017-09-12 | End: 2017-09-12 | Stop reason: SURG

## 2017-09-12 RX ORDER — MIDAZOLAM HYDROCHLORIDE 1 MG/ML
1 INJECTION INTRAMUSCULAR; INTRAVENOUS
Status: DISCONTINUED | OUTPATIENT
Start: 2017-09-12 | End: 2017-09-12 | Stop reason: HOSPADM

## 2017-09-12 RX ORDER — DIPHENHYDRAMINE HYDROCHLORIDE 50 MG/ML
12.5 INJECTION INTRAMUSCULAR; INTRAVENOUS
Status: DISCONTINUED | OUTPATIENT
Start: 2017-09-12 | End: 2017-09-12 | Stop reason: HOSPADM

## 2017-09-12 RX ORDER — SODIUM CHLORIDE 9 MG/ML
9 INJECTION, SOLUTION INTRAVENOUS CONTINUOUS PRN
Status: DISCONTINUED | OUTPATIENT
Start: 2017-09-12 | End: 2017-09-12 | Stop reason: HOSPADM

## 2017-09-12 RX ORDER — EPHEDRINE SULFATE 50 MG/ML
INJECTION, SOLUTION INTRAVENOUS AS NEEDED
Status: DISCONTINUED | OUTPATIENT
Start: 2017-09-12 | End: 2017-09-12 | Stop reason: SURG

## 2017-09-12 RX ORDER — PROMETHAZINE HYDROCHLORIDE 25 MG/1
12.5 TABLET ORAL ONCE AS NEEDED
Status: DISCONTINUED | OUTPATIENT
Start: 2017-09-12 | End: 2017-09-12 | Stop reason: HOSPADM

## 2017-09-12 RX ORDER — ALBUTEROL SULFATE 2.5 MG/3ML
2.5 SOLUTION RESPIRATORY (INHALATION) ONCE AS NEEDED
Status: DISCONTINUED | OUTPATIENT
Start: 2017-09-12 | End: 2017-09-12 | Stop reason: HOSPADM

## 2017-09-12 RX ORDER — FLUMAZENIL 0.1 MG/ML
0.2 INJECTION INTRAVENOUS AS NEEDED
Status: DISCONTINUED | OUTPATIENT
Start: 2017-09-12 | End: 2017-09-12 | Stop reason: HOSPADM

## 2017-09-12 RX ORDER — HYDROCODONE BITARTRATE AND ACETAMINOPHEN 5; 325 MG/1; MG/1
1 TABLET ORAL ONCE AS NEEDED
Status: DISCONTINUED | OUTPATIENT
Start: 2017-09-12 | End: 2017-09-12 | Stop reason: HOSPADM

## 2017-09-12 RX ORDER — ERYTHROMYCIN 5 MG/G
OINTMENT OPHTHALMIC 2 TIMES DAILY
Qty: 3.5 G | Refills: 0 | Status: SHIPPED | OUTPATIENT
Start: 2017-09-12 | End: 2017-09-22

## 2017-09-12 RX ORDER — PROMETHAZINE HYDROCHLORIDE 25 MG/ML
12.5 INJECTION, SOLUTION INTRAMUSCULAR; INTRAVENOUS ONCE AS NEEDED
Status: DISCONTINUED | OUTPATIENT
Start: 2017-09-12 | End: 2017-09-12 | Stop reason: HOSPADM

## 2017-09-12 RX ORDER — OXYCODONE AND ACETAMINOPHEN 7.5; 325 MG/1; MG/1
1 TABLET ORAL ONCE AS NEEDED
Status: DISCONTINUED | OUTPATIENT
Start: 2017-09-12 | End: 2017-09-12 | Stop reason: HOSPADM

## 2017-09-12 RX ORDER — PROMETHAZINE HYDROCHLORIDE 25 MG/1
25 TABLET ORAL ONCE AS NEEDED
Status: DISCONTINUED | OUTPATIENT
Start: 2017-09-12 | End: 2017-09-12 | Stop reason: HOSPADM

## 2017-09-12 RX ORDER — SODIUM CHLORIDE, SODIUM LACTATE, POTASSIUM CHLORIDE, CALCIUM CHLORIDE 600; 310; 30; 20 MG/100ML; MG/100ML; MG/100ML; MG/100ML
100 INJECTION, SOLUTION INTRAVENOUS CONTINUOUS
Status: DISCONTINUED | OUTPATIENT
Start: 2017-09-12 | End: 2017-09-12 | Stop reason: HOSPADM

## 2017-09-12 RX ADMIN — FENTANYL CITRATE 25 MCG: 50 INJECTION INTRAMUSCULAR; INTRAVENOUS at 12:00

## 2017-09-12 RX ADMIN — PHENYLEPHRINE HYDROCHLORIDE 100 MCG: 10 INJECTION INTRAVENOUS at 10:50

## 2017-09-12 RX ADMIN — FENTANYL CITRATE 50 MCG: 50 INJECTION INTRAMUSCULAR; INTRAVENOUS at 12:33

## 2017-09-12 RX ADMIN — EPHEDRINE SULFATE 10 MG: 50 INJECTION INTRAMUSCULAR; INTRAVENOUS; SUBCUTANEOUS at 10:46

## 2017-09-12 RX ADMIN — PHENYLEPHRINE HYDROCHLORIDE 100 MCG: 10 INJECTION INTRAVENOUS at 11:05

## 2017-09-12 RX ADMIN — FAMOTIDINE 20 MG: 10 INJECTION, SOLUTION INTRAVENOUS at 08:26

## 2017-09-12 RX ADMIN — FENTANYL CITRATE 25 MCG: 50 INJECTION INTRAMUSCULAR; INTRAVENOUS at 10:29

## 2017-09-12 RX ADMIN — SUCCINYLCHOLINE CHLORIDE 80 MG: 20 INJECTION, SOLUTION INTRAMUSCULAR; INTRAVENOUS; PARENTERAL at 10:25

## 2017-09-12 RX ADMIN — HYDROCODONE BITARTRATE AND ACETAMINOPHEN 1 TABLET: 7.5; 325 TABLET ORAL at 12:53

## 2017-09-12 RX ADMIN — SODIUM CHLORIDE 9 ML/HR: 9 INJECTION, SOLUTION INTRAVENOUS at 07:58

## 2017-09-12 RX ADMIN — MIDAZOLAM 1 MG: 1 INJECTION INTRAMUSCULAR; INTRAVENOUS at 08:27

## 2017-09-12 RX ADMIN — LIDOCAINE HYDROCHLORIDE 40 MG: 20 INJECTION, SOLUTION INFILTRATION; PERINEURAL at 10:25

## 2017-09-12 RX ADMIN — FENTANYL CITRATE 25 MCG: 50 INJECTION INTRAMUSCULAR; INTRAVENOUS at 12:52

## 2017-09-12 RX ADMIN — SODIUM CHLORIDE, POTASSIUM CHLORIDE, SODIUM LACTATE AND CALCIUM CHLORIDE: 600; 310; 30; 20 INJECTION, SOLUTION INTRAVENOUS at 11:30

## 2017-09-12 RX ADMIN — PROPOFOL 100 MG: 10 INJECTION, EMULSION INTRAVENOUS at 10:25

## 2017-09-12 RX ADMIN — ROCURONIUM BROMIDE 5 MG: 10 INJECTION INTRAVENOUS at 10:25

## 2017-09-12 RX ADMIN — PHENYLEPHRINE HYDROCHLORIDE 100 MCG: 10 INJECTION INTRAVENOUS at 10:55

## 2017-09-12 RX ADMIN — EPHEDRINE SULFATE 5 MG: 50 INJECTION INTRAMUSCULAR; INTRAVENOUS; SUBCUTANEOUS at 10:41

## 2017-09-12 NOTE — ANESTHESIA POSTPROCEDURE EVALUATION
Patient: Laury Palacios    Procedure Summary     Date Anesthesia Start Anesthesia Stop Room / Location    09/12/17 1020 1217  [facility]  /  [facility]       Procedure Diagnosis Surgeon Provider    RT LOWER LID LESION EXCISION W/ ROTATIONAL FLAP, RT LOWER CHEEK EXCISION W/ ROTATIONAL FLAP, LT LOWER LID CICATRICAL ECTROPIAN REPAIR, FULL THICKNESS SKIN  FROST SUTURE (Right Eye) No diagnosis on file. MD Ashwin Dick MD          Anesthesia Type: general  Last vitals  BP   146/68 (09/12/17 1301)    Temp        Pulse   74 (09/12/17 1320)   Resp   18 (09/12/17 1320)    SpO2   95 % (09/12/17 1320)      Post Anesthesia Care and Evaluation    Patient location during evaluation: bedside  Pain management: adequate  Airway patency: patent  Anesthetic complications: No anesthetic complications    Cardiovascular status: acceptable  Respiratory status: acceptable  Hydration status: acceptable

## 2017-09-12 NOTE — ANESTHESIA PROCEDURE NOTES
Airway  Urgency: elective    Airway not difficult    General Information and Staff    Patient location during procedure: OR  Anesthesiologist: DARRIUS NAVAS  CRNA: NAV PARTIDA    Indications and Patient Condition  Indications for airway management: airway protection    Preoxygenated: yes  MILS maintained throughout  Mask difficulty assessment: 1 - vent by mask    Final Airway Details  Final airway type: endotracheal airway      Successful airway: ETT  Cuffed: yes   Successful intubation technique: direct laryngoscopy  Facilitating devices/methods: intubating stylet  Endotracheal tube insertion site: oral  Blade: Yayo  Blade size: #3  ETT size: 7.0 mm  Cormack-Lehane Classification: grade I - full view of glottis  Placement verified by: chest auscultation and capnometry   Cuff volume (mL): 8  Measured from: lips  ETT to lips (cm): 20  Number of attempts at approach: 1    Additional Comments  Atraumatic ET Tube placement.  Teeth as pre-op. BLEBS.  -ABD sounds.  +ET CO2.  Secured to face

## 2017-09-12 NOTE — ANESTHESIA PREPROCEDURE EVALUATION
Anesthesia Evaluation     Patient summary reviewed and Nursing notes reviewed   NPO Solid Status: > 8 hours  NPO Liquid Status: > 2 hours     Airway   Mallampati: II  TM distance: >3 FB  Neck ROM: full  no difficulty expected  Dental - normal exam   (+) upper dentures    Pulmonary - normal exam   (+) asthma,   Cardiovascular - normal exam  Exercise tolerance: poor (<4 METS)    ECG reviewed  PT is on anticoagulation therapy  Patient on routine beta blocker and Beta blocker given within 24 hours of surgery  Rhythm: regular  Rate: normal    (+) hypertension well controlled, hyperlipidemia      Neuro/Psych  (+) TIA, numbness,    GI/Hepatic/Renal/Endo    (+)  GERD, diabetes mellitus type 2 well controlled,     Musculoskeletal     (+) back pain, neck pain,   Abdominal  - normal exam   Substance History - negative use     OB/GYN          Other   (+) arthritis   history of cancer remission                                    Anesthesia Plan    ASA 3     general     intravenous induction   Anesthetic plan and risks discussed with patient.

## 2017-09-13 LAB
LAB AP CASE REPORT: NORMAL
Lab: NORMAL
Lab: NORMAL
PATH REPORT.FINAL DX SPEC: NORMAL
PATH REPORT.GROSS SPEC: NORMAL

## 2018-12-14 ENCOUNTER — CONVERSION ENCOUNTER (OUTPATIENT)
Dept: MAMMOGRAPHY | Facility: HOSPITAL | Age: 83
End: 2018-12-14

## 2019-01-15 ENCOUNTER — HOSPITAL ENCOUNTER (OUTPATIENT)
Dept: GASTROENTEROLOGY | Facility: HOSPITAL | Age: 84
Setting detail: HOSPITAL OUTPATIENT SURGERY
Discharge: HOME | End: 2019-01-15
Attending: INTERNAL MEDICINE

## 2019-01-15 LAB
GLUCOSE BLD-MCNC: 300 MG/DL (ref 65–99)
GLUCOSE BLD-MCNC: 339 MG/DL (ref 65–99)

## 2019-01-24 ENCOUNTER — HOSPITAL ENCOUNTER (OUTPATIENT)
Dept: LAB | Facility: HOSPITAL | Age: 84
Discharge: HOME | End: 2019-01-24

## 2019-01-24 LAB
ANION GAP SERPL CALC-SCNC: 18 MMOL/L (ref 8–19)
BUN SERPL-MCNC: 35 MG/DL (ref 5–25)
BUN/CREAT SERPL: 25 {RATIO} (ref 6–20)
CALCIUM SERPL-MCNC: 9.8 MG/DL (ref 8.7–10.4)
CHLORIDE SERPL-SCNC: 101 MMOL/L (ref 99–111)
CHOLEST SERPL-MCNC: 156 MG/DL (ref 107–200)
CHOLEST/HDLC SERPL: 2.6 {RATIO} (ref 3–6)
CONV CO2: 27 MMOL/L (ref 22–32)
CREAT UR-MCNC: 1.4 MG/DL (ref 0.5–0.9)
EST. AVERAGE GLUCOSE BLD GHB EST-MCNC: 229 MG/DL
GFR SERPLBLD BASED ON 1.73 SQ M-ARVRAT: 33 ML/MIN/{1.73_M2}
GLUCOSE SERPL-MCNC: 213 MG/DL (ref 65–99)
HBA1C MFR BLD: 9.6 % (ref 3.5–5.7)
HDLC SERPL-MCNC: 60 MG/DL (ref 40–60)
LDLC SERPL CALC-MCNC: 54 MG/DL (ref 70–100)
OSMOLALITY SERPL CALC.SUM OF ELEC: 306 MOSM/KG (ref 273–304)
POTASSIUM SERPL-SCNC: 4.8 MMOL/L (ref 3.5–5.3)
SODIUM SERPL-SCNC: 141 MMOL/L (ref 135–147)
TRIGL SERPL-MCNC: 209 MG/DL (ref 40–150)
VLDLC SERPL-MCNC: 42 MG/DL (ref 5–37)

## 2019-02-12 ENCOUNTER — OFFICE VISIT CONVERTED (OUTPATIENT)
Dept: SURGERY | Facility: CLINIC | Age: 84
End: 2019-02-12
Attending: SURGERY

## 2019-04-18 ENCOUNTER — HOSPITAL ENCOUNTER (OUTPATIENT)
Dept: OTHER | Facility: HOSPITAL | Age: 84
Discharge: HOME | End: 2019-04-18

## 2019-04-18 LAB
ANION GAP SERPL CALC-SCNC: 16 MMOL/L (ref 8–19)
BUN SERPL-MCNC: 32 MG/DL (ref 5–25)
BUN/CREAT SERPL: 24 {RATIO} (ref 6–20)
CALCIUM SERPL-MCNC: 10 MG/DL (ref 8.7–10.4)
CHLORIDE SERPL-SCNC: 101 MMOL/L (ref 99–111)
CONV CO2: 28 MMOL/L (ref 22–32)
CREAT UR-MCNC: 1.32 MG/DL (ref 0.5–0.9)
EST. AVERAGE GLUCOSE BLD GHB EST-MCNC: 180 MG/DL
GFR SERPLBLD BASED ON 1.73 SQ M-ARVRAT: 36 ML/MIN/{1.73_M2}
GLUCOSE SERPL-MCNC: 169 MG/DL (ref 65–99)
HBA1C MFR BLD: 7.9 % (ref 3.5–5.7)
OSMOLALITY SERPL CALC.SUM OF ELEC: 301 MOSM/KG (ref 273–304)
POTASSIUM SERPL-SCNC: 4.8 MMOL/L (ref 3.5–5.3)
SODIUM SERPL-SCNC: 140 MMOL/L (ref 135–147)

## 2019-07-10 ENCOUNTER — HOSPITAL ENCOUNTER (OUTPATIENT)
Dept: LAB | Facility: HOSPITAL | Age: 84
Discharge: HOME | End: 2019-07-10

## 2019-07-10 LAB
ALBUMIN SERPL-MCNC: 4.2 G/DL (ref 3.5–5)
ALBUMIN/GLOB SERPL: 1.4 {RATIO} (ref 1.4–2.6)
ALP SERPL-CCNC: 82 U/L (ref 43–160)
ALT SERPL-CCNC: 7 U/L (ref 10–40)
ANION GAP SERPL CALC-SCNC: 21 MMOL/L (ref 8–19)
AST SERPL-CCNC: 13 U/L (ref 15–50)
BILIRUB SERPL-MCNC: 0.37 MG/DL (ref 0.2–1.3)
BUN SERPL-MCNC: 35 MG/DL (ref 5–25)
BUN/CREAT SERPL: 21 {RATIO} (ref 6–20)
CALCIUM SERPL-MCNC: 9.7 MG/DL (ref 8.7–10.4)
CHLORIDE SERPL-SCNC: 94 MMOL/L (ref 99–111)
CONV CO2: 23 MMOL/L (ref 22–32)
CONV CREATININE URINE, RANDOM: 74.4 MG/DL (ref 10–300)
CONV MICROALBUM.,U,RANDOM: 95.3 MG/L (ref 0–20)
CONV TOTAL PROTEIN: 7.1 G/DL (ref 6.3–8.2)
CREAT UR-MCNC: 1.68 MG/DL (ref 0.5–0.9)
EST. AVERAGE GLUCOSE BLD GHB EST-MCNC: 209 MG/DL
GFR SERPLBLD BASED ON 1.73 SQ M-ARVRAT: 27 ML/MIN/{1.73_M2}
GLOBULIN UR ELPH-MCNC: 2.9 G/DL (ref 2–3.5)
GLUCOSE SERPL-MCNC: 354 MG/DL (ref 65–99)
HBA1C MFR BLD: 8.9 % (ref 3.5–5.7)
MICROALBUMIN/CREAT UR: 128.1 MG/G{CRE} (ref 0–35)
OSMOLALITY SERPL CALC.SUM OF ELEC: 298 MOSM/KG (ref 273–304)
POTASSIUM SERPL-SCNC: 4.5 MMOL/L (ref 3.5–5.3)
SODIUM SERPL-SCNC: 133 MMOL/L (ref 135–147)

## 2019-08-14 ENCOUNTER — HOSPITAL ENCOUNTER (OUTPATIENT)
Dept: CT IMAGING | Facility: HOSPITAL | Age: 84
Discharge: HOME | End: 2019-08-14
Attending: THORACIC SURGERY (CARDIOTHORACIC VASCULAR SURGERY)

## 2019-08-20 ENCOUNTER — HOSPITAL ENCOUNTER (OUTPATIENT)
Dept: OTHER | Facility: HOSPITAL | Age: 84
Discharge: HOME | End: 2019-08-20
Attending: INTERNAL MEDICINE

## 2019-08-22 LAB
BACTERIA SPEC AEROBE CULT: ABNORMAL
CIPROFLOXACIN SUSC ISLT: >=8
CLINDAMYCIN SUSC ISLT: 0.25
DAPTOMYCIN SUSC ISLT: 0.25
DOXYCYCLINE SUSC ISLT: <=0.5
ERYTHROMYCIN SUSC ISLT: >=8
GENTAMICIN SUSC ISLT: <=0.5
LEVOFLOXACIN SUSC ISLT: >=8
OXACILLIN SUSC ISLT: >=4
RIFAMPIN SUSC ISLT: <=0.5
TETRACYCLINE SUSC ISLT: <=1
TMP SMX SUSC ISLT: <=10
VANCOMYCIN SUSC ISLT: 1

## 2019-09-30 ENCOUNTER — HOSPITAL ENCOUNTER (OUTPATIENT)
Dept: OTHER | Facility: HOSPITAL | Age: 84
Discharge: HOME | End: 2019-09-30

## 2019-09-30 LAB
ANION GAP SERPL CALC-SCNC: 18 MMOL/L (ref 8–19)
BUN SERPL-MCNC: 35 MG/DL (ref 5–25)
BUN/CREAT SERPL: 22 {RATIO} (ref 6–20)
CALCIUM SERPL-MCNC: 10.2 MG/DL (ref 8.7–10.4)
CHLORIDE SERPL-SCNC: 106 MMOL/L (ref 99–111)
CONV CO2: 25 MMOL/L (ref 22–32)
CREAT UR-MCNC: 1.57 MG/DL (ref 0.5–0.9)
EST. AVERAGE GLUCOSE BLD GHB EST-MCNC: 189 MG/DL
GFR SERPLBLD BASED ON 1.73 SQ M-ARVRAT: 29 ML/MIN/{1.73_M2}
GLUCOSE SERPL-MCNC: 112 MG/DL (ref 65–99)
HBA1C MFR BLD: 8.2 % (ref 3.5–5.7)
OSMOLALITY SERPL CALC.SUM OF ELEC: 307 MOSM/KG (ref 273–304)
POTASSIUM SERPL-SCNC: 4.6 MMOL/L (ref 3.5–5.3)
SODIUM SERPL-SCNC: 144 MMOL/L (ref 135–147)

## 2019-10-29 ENCOUNTER — HOSPITAL ENCOUNTER (OUTPATIENT)
Dept: GENERAL RADIOLOGY | Facility: HOSPITAL | Age: 84
Discharge: HOME | End: 2019-10-29
Attending: INTERNAL MEDICINE

## 2019-10-29 LAB
ALBUMIN SERPL-MCNC: 4.2 G/DL (ref 3.5–5)
ANION GAP SERPL CALC-SCNC: 18 MMOL/L (ref 8–19)
BASOPHILS # BLD AUTO: 0.04 10*3/UL (ref 0–0.2)
BASOPHILS NFR BLD AUTO: 0.6 % (ref 0–3)
BUN SERPL-MCNC: 33 MG/DL (ref 5–25)
BUN/CREAT SERPL: 26 {RATIO} (ref 6–20)
CALCIUM SERPL-MCNC: 10.1 MG/DL (ref 8.7–10.4)
CHLORIDE SERPL-SCNC: 102 MMOL/L (ref 99–111)
CONV ABS IMM GRAN: 0.02 10*3/UL (ref 0–0.2)
CONV CO2: 25 MMOL/L (ref 22–32)
CONV IMMATURE GRAN: 0.3 % (ref 0–1.8)
CREAT UR-MCNC: 1.28 MG/DL (ref 0.5–0.9)
DEPRECATED RDW RBC AUTO: 46.6 FL (ref 36.4–46.3)
EOSINOPHIL # BLD AUTO: 0.16 10*3/UL (ref 0–0.7)
EOSINOPHIL # BLD AUTO: 2.3 % (ref 0–7)
ERYTHROCYTE [DISTWIDTH] IN BLOOD BY AUTOMATED COUNT: 14.3 % (ref 11.7–14.4)
GFR SERPLBLD BASED ON 1.73 SQ M-ARVRAT: 37 ML/MIN/{1.73_M2}
GLUCOSE SERPL-MCNC: 187 MG/DL (ref 65–99)
HCT VFR BLD AUTO: 37.1 % (ref 37–47)
HGB BLD-MCNC: 11.1 G/DL (ref 12–16)
LYMPHOCYTES # BLD AUTO: 1.45 10*3/UL (ref 1–5)
LYMPHOCYTES NFR BLD AUTO: 20.8 % (ref 20–45)
MCH RBC QN AUTO: 26.9 PG (ref 27–31)
MCHC RBC AUTO-ENTMCNC: 29.9 G/DL (ref 33–37)
MCV RBC AUTO: 89.8 FL (ref 81–99)
MONOCYTES # BLD AUTO: 0.45 10*3/UL (ref 0.2–1.2)
MONOCYTES NFR BLD AUTO: 6.5 % (ref 3–10)
NEUTROPHILS # BLD AUTO: 4.85 10*3/UL (ref 2–8)
NEUTROPHILS NFR BLD AUTO: 69.5 % (ref 30–85)
NRBC CBCN: 0 % (ref 0–0.7)
PHOSPHATE SERPL-MCNC: 3.2 MG/DL (ref 2.4–4.5)
PLATELET # BLD AUTO: 267 10*3/UL (ref 130–400)
PMV BLD AUTO: 9.3 FL (ref 9.4–12.3)
POTASSIUM SERPL-SCNC: 4.2 MMOL/L (ref 3.5–5.3)
RBC # BLD AUTO: 4.13 10*6/UL (ref 4.2–5.4)
SODIUM SERPL-SCNC: 141 MMOL/L (ref 135–147)
WBC # BLD AUTO: 6.97 10*3/UL (ref 4.8–10.8)

## 2019-12-11 ENCOUNTER — HOSPITAL ENCOUNTER (OUTPATIENT)
Dept: URGENT CARE | Facility: CLINIC | Age: 84
Discharge: HOME | End: 2019-12-11
Attending: NURSE PRACTITIONER

## 2019-12-11 LAB — GLUCOSE BLD-MCNC: 250 MG/DL (ref 65–99)

## 2019-12-16 ENCOUNTER — HOSPITAL ENCOUNTER (OUTPATIENT)
Dept: MAMMOGRAPHY | Facility: HOSPITAL | Age: 84
Discharge: HOME | End: 2019-12-16
Attending: INTERNAL MEDICINE

## 2020-02-20 ENCOUNTER — HOSPITAL ENCOUNTER (OUTPATIENT)
Dept: CARDIOLOGY | Facility: HOSPITAL | Age: 85
Discharge: HOME | End: 2020-02-20
Attending: INTERNAL MEDICINE

## 2020-07-30 ENCOUNTER — HOSPITAL ENCOUNTER (OUTPATIENT)
Dept: OTHER | Facility: HOSPITAL | Age: 85
Discharge: HOME | End: 2020-07-30
Attending: INTERNAL MEDICINE

## 2020-07-30 LAB — ERYTHROCYTE [SEDIMENTATION RATE] IN BLOOD: 18 MM/H (ref 0–30)

## 2020-10-19 ENCOUNTER — HOSPITAL ENCOUNTER (OUTPATIENT)
Dept: OTHER | Facility: HOSPITAL | Age: 85
Discharge: HOME | End: 2020-10-19
Attending: NURSE PRACTITIONER

## 2020-10-19 LAB
25(OH)D3 SERPL-MCNC: 17.8 NG/ML (ref 30–100)
ALBUMIN SERPL-MCNC: 4.7 G/DL (ref 3.5–5)
ANION GAP SERPL CALC-SCNC: 27 MMOL/L (ref 8–19)
APPEARANCE UR: CLEAR
BASOPHILS # BLD AUTO: 0.04 10*3/UL (ref 0–0.2)
BASOPHILS NFR BLD AUTO: 0.6 % (ref 0–3)
BILIRUB UR QL: NEGATIVE
BUN SERPL-MCNC: 47 MG/DL (ref 5–25)
BUN/CREAT SERPL: 25 {RATIO} (ref 6–20)
CALCIUM SERPL-MCNC: 10.9 MG/DL (ref 8.7–10.4)
CHLORIDE SERPL-SCNC: 96 MMOL/L (ref 99–111)
COLOR UR: YELLOW
CONV ABS IMM GRAN: 0.03 10*3/UL (ref 0–0.2)
CONV BACTERIA: NEGATIVE
CONV CO2: 21 MMOL/L (ref 22–32)
CONV COLLECTION SOURCE (UA): ABNORMAL
CONV CREATININE URINE, RANDOM: 30.7 MG/DL (ref 10–300)
CONV IMMATURE GRAN: 0.5 % (ref 0–1.8)
CONV PROTEIN TO CREATININE RATIO (RANDOM URINE): 0.73 {RATIO} (ref 0–0.1)
CONV UROBILINOGEN IN URINE BY AUTOMATED TEST STRIP: 0.2 {EHRLICHU}/DL (ref 0.1–1)
CREAT UR-MCNC: 1.87 MG/DL (ref 0.5–0.9)
DEPRECATED RDW RBC AUTO: 45.8 FL (ref 36.4–46.3)
EOSINOPHIL # BLD AUTO: 0.18 10*3/UL (ref 0–0.7)
EOSINOPHIL # BLD AUTO: 2.8 % (ref 0–7)
ERYTHROCYTE [DISTWIDTH] IN BLOOD BY AUTOMATED COUNT: 14.6 % (ref 11.7–14.4)
GFR SERPLBLD BASED ON 1.73 SQ M-ARVRAT: 23 ML/MIN/{1.73_M2}
GLUCOSE SERPL-MCNC: 430 MG/DL (ref 65–99)
GLUCOSE UR QL: >=1000 MG/DL
HCT VFR BLD AUTO: 40.8 % (ref 37–47)
HGB BLD-MCNC: 12.2 G/DL (ref 12–16)
HGB UR QL STRIP: NEGATIVE
KETONES UR QL STRIP: NEGATIVE MG/DL
LEUKOCYTE ESTERASE UR QL STRIP: NEGATIVE
LYMPHOCYTES # BLD AUTO: 1.56 10*3/UL (ref 1–5)
LYMPHOCYTES NFR BLD AUTO: 24.6 % (ref 20–45)
MCH RBC QN AUTO: 25.7 PG (ref 27–31)
MCHC RBC AUTO-ENTMCNC: 29.9 G/DL (ref 33–37)
MCV RBC AUTO: 86.1 FL (ref 81–99)
MONOCYTES # BLD AUTO: 0.49 10*3/UL (ref 0.2–1.2)
MONOCYTES NFR BLD AUTO: 7.7 % (ref 3–10)
NEUTROPHILS # BLD AUTO: 4.04 10*3/UL (ref 2–8)
NEUTROPHILS NFR BLD AUTO: 63.8 % (ref 30–85)
NITRITE UR QL STRIP: NEGATIVE
NRBC CBCN: 0 % (ref 0–0.7)
PH UR STRIP.AUTO: 6 [PH] (ref 5–8)
PHOSPHATE SERPL-MCNC: 3.8 MG/DL (ref 2.4–4.5)
PLATELET # BLD AUTO: 225 10*3/UL (ref 130–400)
PMV BLD AUTO: 9.2 FL (ref 9.4–12.3)
POTASSIUM SERPL-SCNC: 4.6 MMOL/L (ref 3.5–5.3)
PROT UR QL: 30 MG/DL
PROT UR-MCNC: 22.5 MG/DL
PTH-INTACT SERPL-MCNC: 116.7 PG/ML (ref 11.1–79.5)
RBC # BLD AUTO: 4.74 10*6/UL (ref 4.2–5.4)
RBC #/AREA URNS HPF: ABNORMAL /[HPF]
SODIUM SERPL-SCNC: 139 MMOL/L (ref 135–147)
SP GR UR: 1.02 (ref 1–1.03)
WBC # BLD AUTO: 6.34 10*3/UL (ref 4.8–10.8)
WBC #/AREA URNS HPF: ABNORMAL /[HPF]

## 2021-02-26 ENCOUNTER — HOSPITAL ENCOUNTER (OUTPATIENT)
Dept: MAMMOGRAPHY | Facility: HOSPITAL | Age: 86
Discharge: HOME | End: 2021-02-26
Attending: INTERNAL MEDICINE

## 2021-03-10 ENCOUNTER — CONVERSION ENCOUNTER (OUTPATIENT)
Dept: UROLOGY | Facility: CLINIC | Age: 86
End: 2021-03-10

## 2021-03-10 ENCOUNTER — HOSPITAL ENCOUNTER (OUTPATIENT)
Dept: UROLOGY | Facility: CLINIC | Age: 86
Discharge: HOME | End: 2021-03-10
Attending: NURSE PRACTITIONER

## 2021-03-10 ENCOUNTER — OFFICE VISIT CONVERTED (OUTPATIENT)
Dept: UROLOGY | Facility: CLINIC | Age: 86
End: 2021-03-10
Attending: NURSE PRACTITIONER

## 2021-03-10 LAB
BILIRUB UR QL STRIP: NEGATIVE
COLOR UR: YELLOW
CONV BACTERIA IN URINE MICRO: NORMAL
CONV CALCIUM OXALATE CRYSTALS /HPF IN URINE SEDIMENT BY MICROSCOPY: NORMAL
CONV CLARITY OF URINE: NORMAL
CONV PROTEIN IN URINE BY AUTOMATED TEST STRIP: NORMAL
CONV UROBILINOGEN IN URINE BY AUTOMATED TEST STRIP: NORMAL
GLUCOSE UR QL: NEGATIVE
HGB UR QL STRIP: NORMAL
KETONES UR QL STRIP: NEGATIVE
LEUKOCYTE ESTERASE UR QL STRIP: NORMAL
NITRITE UR QL STRIP: NEGATIVE
PH UR STRIP.AUTO: 5.5 [PH]
RBC #/AREA URNS HPF: 0 /[HPF]
RENAL EPI CELLS #/AREA URNS HPF: 0 /[HPF]
SP GR UR: 1.02
SQUAMOUS SPT QL MICRO: NORMAL
WBC #/AREA URNS HPF: NORMAL /[HPF]

## 2021-03-13 LAB
AMPICILLIN SUSC ISLT: >=32
AMPICILLIN+SULBAC SUSC ISLT: 4
BACTERIA UR CULT: ABNORMAL
CEFAZOLIN SUSC ISLT: <=4
CEFEPIME SUSC ISLT: <=0.12
CEFTAZIDIME SUSC ISLT: <=1
CEFTRIAXONE SUSC ISLT: <=0.25
CIPROFLOXACIN SUSC ISLT: <=0.25
ERTAPENEM SUSC ISLT: <=0.12
GENTAMICIN SUSC ISLT: <=1
LEVOFLOXACIN SUSC ISLT: <=0.12
NITROFURANTOIN SUSC ISLT: <=16
PIP+TAZO SUSC ISLT: <=4
TMP SMX SUSC ISLT: <=20
TOBRAMYCIN SUSC ISLT: <=1

## 2021-03-18 ENCOUNTER — HOSPITAL ENCOUNTER (OUTPATIENT)
Dept: GENERAL RADIOLOGY | Facility: HOSPITAL | Age: 86
Discharge: HOME | End: 2021-03-18
Attending: NURSE PRACTITIONER

## 2021-03-25 ENCOUNTER — HOSPITAL ENCOUNTER (OUTPATIENT)
Dept: UROLOGY | Facility: CLINIC | Age: 86
Discharge: HOME | End: 2021-03-25
Attending: UROLOGY

## 2021-03-25 ENCOUNTER — OFFICE VISIT CONVERTED (OUTPATIENT)
Dept: UROLOGY | Facility: CLINIC | Age: 86
End: 2021-03-25
Attending: UROLOGY

## 2021-03-25 ENCOUNTER — CONVERSION ENCOUNTER (OUTPATIENT)
Dept: UROLOGY | Facility: CLINIC | Age: 86
End: 2021-03-25

## 2021-03-25 LAB
BILIRUB UR QL STRIP: NEGATIVE
COLOR UR: YELLOW
CONV BACTERIA IN URINE MICRO: 0
CONV CALCIUM OXALATE CRYSTALS /HPF IN URINE SEDIMENT BY MICROSCOPY: 0
CONV CLARITY OF URINE: NORMAL
CONV PROTEIN IN URINE BY AUTOMATED TEST STRIP: NEGATIVE
CONV UROBILINOGEN IN URINE BY AUTOMATED TEST STRIP: NORMAL
GLUCOSE UR QL: NEGATIVE
HGB UR QL STRIP: NORMAL
KETONES UR QL STRIP: NEGATIVE
LEUKOCYTE ESTERASE UR QL STRIP: NORMAL
NITRITE UR QL STRIP: NEGATIVE
PH UR STRIP.AUTO: 5.5 [PH]
RBC #/AREA URNS HPF: NORMAL /[HPF]
RENAL EPI CELLS #/AREA URNS HPF: 0 /[HPF]
SP GR UR: 1.01
SQUAMOUS SPT QL MICRO: 0
WBC #/AREA URNS HPF: NORMAL /[HPF]

## 2021-03-28 LAB
AMPICILLIN SUSC ISLT: >=32
AMPICILLIN+SULBAC SUSC ISLT: >=32
BACTERIA UR CULT: ABNORMAL
CEFAZOLIN SUSC ISLT: <=4
CEFEPIME SUSC ISLT: <=0.12
CEFTAZIDIME SUSC ISLT: <=1
CEFTRIAXONE SUSC ISLT: <=0.25
CIPROFLOXACIN SUSC ISLT: >=4
ERTAPENEM SUSC ISLT: <=0.12
GENTAMICIN SUSC ISLT: >=16
LEVOFLOXACIN SUSC ISLT: >=8
NITROFURANTOIN SUSC ISLT: <=16
PIP+TAZO SUSC ISLT: <=4
TMP SMX SUSC ISLT: >=320
TOBRAMYCIN SUSC ISLT: 8

## 2021-04-01 ENCOUNTER — HOSPITAL ENCOUNTER (OUTPATIENT)
Dept: GENERAL RADIOLOGY | Facility: HOSPITAL | Age: 86
Discharge: HOME | End: 2021-04-01
Attending: INTERNAL MEDICINE

## 2021-04-05 ENCOUNTER — HOSPITAL ENCOUNTER (OUTPATIENT)
Dept: UROLOGY | Facility: CLINIC | Age: 86
Discharge: HOME | End: 2021-04-05
Attending: NURSE PRACTITIONER

## 2021-04-16 ENCOUNTER — HOSPITAL ENCOUNTER (OUTPATIENT)
Dept: MAMMOGRAPHY | Facility: HOSPITAL | Age: 86
Discharge: HOME | End: 2021-04-16
Attending: INTERNAL MEDICINE

## 2021-04-21 ENCOUNTER — OFFICE VISIT CONVERTED (OUTPATIENT)
Dept: SURGERY | Facility: CLINIC | Age: 86
End: 2021-04-21
Attending: SURGERY

## 2021-05-07 LAB
BACTERIA UR CULT: ABNORMAL
CEFAZOLIN SUSC ISLT: >=64
CEFEPIME SUSC ISLT: <=0.12
CEFTAZIDIME SUSC ISLT: <=1
CIPROFLOXACIN SUSC ISLT: <=0.25
ERTAPENEM SUSC ISLT: <=0.12
GENTAMICIN SUSC ISLT: <=1
LEVOFLOXACIN SUSC ISLT: <=0.12
NITROFURANTOIN SUSC ISLT: 64
PIP+TAZO SUSC ISLT: <=4
TMP SMX SUSC ISLT: <=20
TOBRAMYCIN SUSC ISLT: <=1

## 2021-05-10 NOTE — H&P
History and Physical      Patient Name: Laury Palacios   Patient ID: [id number]   Sex: Female   YOB: 1930    Primary Care Provider: Doni Antunez MD   Referring Provider: Doni Antunez MD    Visit Date: March 25, 2021    Provider: Rosi Flood MD   Location: [facility]   Location Address: [address] [place] [address], [address]  [id number]   Location Phone: [phone]          Chief Complaint  · recurrent uti      History Of Present Illness  This is a 89 year old /White female , who is here to follow up on recurrent uti and results of ct done 3/18/21. Patient continues to have recurrent urine tract infection and her CT scan is normal. At this time she is extremely uncomfortable because of external hemorrhoids which are not bleeding. Patient is better as far as dysuria is concerned       Past Medical History  Arthritis; Asthma; Bladder disorder; Diabetes; Dysuria; Hemorrhoids; High blood pressure; Limb Pain; Limb Swelling; Rectal bleeding; Stroke; Stuttering; Ulcer         Past Surgical History  Colonoscopy; EGD; Gallbladder         Medication List  amlodipine 2.5 mg oral tablet; Anusol-HC 2.5 % topical cream with perineal applicator; Bactrim -160 mg oral tablet; doxycycline hyclate 100 mg oral tablet; estradiol 0.01 % (0.1 mg/gram) vaginal cream; fluconazole 150 mg oral tablet; folic acid 1 mg oral tablet; furosemide 40 mg oral tablet; gabapentin 100 mg oral capsule; Humalog KwikPen Insulin 100 unit/mL subcutaneous insulin pen; ibuprofen 800 mg oral tablet; lisinopril 2.5 mg oral tablet; loratadine 10 mg oral tablet; lorazepam 1 mg oral tablet; metoprolol succinate 25 mg oral tablet extended release 24 hr; NuLYTELY with Flavor Packs 420 gram oral recon soln; omeprazole 40 mg oral capsule,delayed release(DR/EC); Plavix 75 mg oral tablet; ropinirole 0.25 mg oral tablet; rosuvastatin 20 mg oral tablet; sucralfate 1 gram oral tablet; theophylline 400 mg oral tablet  "extended release 24 hr         Allergy List  NO KNOWN DRUG ALLERGIES       Allergies Reconciled  Family Medical History  - No Family History of Colorectal Cancer; Family history of breast cancer; -Father's Family History Unknown; -Mother's Family History Unknown         Social History  Alcohol (Never); Caffeine (Current every day); Second hand smoke exposure (Never); Tobacco (Former)         Review of Systems  · Constitutional  o Denies  o : fever, headache, chills  · Eyes  o Denies  o : eye pain, double vision, blurred vision  · HENT  o Denies  o : sinus problems, sore throat, ear infection  · Cardiovascular  o Denies  o : chest pain, high blood pressure, varicosities  · Respiratory  o Denies  o : shortness of breath, wheezing, frequent cough  · Gastrointestinal  o Admits  o : hemorrhoids  o Denies  o : nausea, vomiting, heartburn, indigestion, abdominal pain  · Genitourinary  o Admits  o : painful urination  o Denies  o : urgency, frequency, urinary retention  · Integument  o Denies  o : rash, itching, boils  · Neurologic  o Denies  o : tingling or numbness, tremors, dizzy spells  · Musculoskeletal  o Denies  o : joint pain, neck pain, back pain  · Endocrine  o Denies  o : cold intolerance, heat intolerance, tired, excessive thirst, sluggish  · Psychiatric  o Admits  o : feels satisfied with life  o Denies  o : severe depression, concerns with hurting themselves  · Heme-Lymph  o Denies  o : swollen glands, blood clotting problems  · Allergic-Immunologic  o Denies  o : sinus allergy symptoms, hay fever      Vitals  Date Time BP Position Site L\R Cuff Size HR RR TEMP (F) WT  HT  BMI kg/m2 BSA m2 O2 Sat FR L/min FiO2 HC       03/25/2021 11:23 /56 Sitting    67 - R  97.4 171lbs 0oz 5'  4\" 29.35 1.87             Physical Examination  · Constitutional  o Appearance  o : Well nourished, well developed patient in no acute distress. Ambulating without difficulty.  · Respiratory  o Respiratory Effort  o : breathing " labored, no accessory muscle use  o Inspection of Chest  o : normal appearance, no retractions  o Auscultation of Lungs  o : Bilateral rhonchi present  · Cardiovascular  o Heart  o :   § Auscultation of Heart  § : regular rate and rhythm, no murmurs, gallops or rubs  o Peripheral Vascular System  o : No abnormalities  · Gastrointestinal  o Abdominal Examination  o : Scaphoid abdomen which is non-tender to palpation with normal tone and without rigidity or guarding. Normal bowel sounds. No masses present.  o Liver and spleen  o : No hepatomegaly present. Liver is non-tender to palpation and spleen is not palpable.  o Hernias  o : No abdominal wall hernias are present.  · Skin and Subcutaneous Tissue  o General Inspection  o : No rashes, lesions or areas of discoloration present. Skin turgor is normal.  o General Palpation  o : No abnormalities, masses or tenderness on palpation.  · Neurologic  o Mental Status Examination  o : grossly oriented to person, place and time  o Gait and Station  o : normal gait, able to stand without difficulty  · Psychiatric  o Mood and Affect  o : mood normal, affect appropriate      Figure 1.0: Pain Rating Scale-[place]         Results  · In-Office Procedures  o Lab procedure  § Automated dipstick urinalysis with microscopy (13362)   § Color Ur: Yellow   § Clarity Ur: Cloudy   § Glucose Ur Ql Strip: Negative   § Bilirub Ur Ql Strip: Negative   § Ketones Ur Ql Strip: Negative   § Sp Gr Ur Qn: 1.015   § Hgb Ur Ql Strip: Trace-Lysed   § pH Ur-LsCnc: 5.5   § Prot Ur Ql Strip: Negative   § Urobilinogen Ur Strip-mCnc: 0.2 E.U./dL   § Nitrite Ur Ql Strip: Negative   § WBC Est Ur Ql Strip: Moderate   § RBC UrnS Qn HPF: 0-1   § WBC UrnS Qn HPF: 5-6   § Bacteria UrnS Qn HPF: 0   § Crystals UrnS Qn HPF: 0   § Epithelial Cells (non renal): 0 /HPF  § Epithelial Cells (renal): 0       Assessment  · Diabetes     250.92  · Hemorrhoids     455.6/K64.9  grade II  · Recurrent urinary tract  infection     599.0/N39.0    Problems Reconciled  Plan  · Orders  o Urine culture (42312, 51095) - 455.6/K64.9, 250.92, 599.0/N39.0 - 03/25/2021  · Medications  o Medications have been Reconciled  o Transition of Care or Provider Policy  · Instructions  o Will refer her to Dr. Dolores Ga to evaluate her hemorrhoids. We will do cystoscopy sometimes in May just to make sure no bladder calculi present            Electronically Signed by: Rosi Flood MD -Author on March 25, 2021 12:03:13 PM

## 2021-05-10 NOTE — H&P
"   History and Physical      Patient Name: Laury Palacios   Patient ID: [id number]   Sex: Female   YOB: 1930    Primary Care Provider: Dixie Lozano MD   Referring Provider: Dixie Lozano MD    Visit Date: March 10, 2021    Provider: PADMINI Conde   Location: [facility]   Location Address: [address] [place] [address], [address]  [place], [place]  [id number]   Location Phone: [phone]          Chief Complaint  · Painful urination  · recurrent uti       History Of Present Illness  This is a 89 year old  /White female , who is here for the evaluation of recurrent uti, referred from Doni Antunez MD. Patient was seen in their office on 2- according to ov note and urine to have been sent for culture. No culture results and no antibiotic prescribed. Patient is here with her Neighbor. Patient lives with her daughter Melissa who was working and could not come. Neighbor friend does not know patients complete medical history, but helps patient. Patient history of CVA with residual affects. Presence of mild expressive aphasia and difficulty with memory. Patient reports having recurrent uti since age of 50. Increase in episodes when blood sugars elevated. Patient states that she was told by Dr garcía that if her infection did not resolve, he would have to put in her the hospital. She did not want to go to the hospital. Had been taking doxycycline 100mg bid bottle date 2- which she stopped after 2 months of daily use. Having problems with increased vaginal itching and burning. Yeast infections. Problems with urinary incontinence noted more with urge. Seen per Dr Jefferson years ago but stopped seeing \"did not do anything\". Denies history of breast or uterine cancer. Quit smoking over 20 years ago. Denies gross hematuria but pain and burning vaginal area with urination. Even when no other symptoms \"feels raw\".       Past Medical History  Arthritis; Asthma; Bladder disorder; Diabetes; " Dysuria; Hemorrhoids; High blood pressure; Limb Pain; Limb Swelling; Rectal bleeding; Stroke; Stuttering; Ulcer         Past Surgical History  Colonoscopy; EGD; Gallbladder         Medication List  amlodipine 2.5 mg oral tablet; doxycycline hyclate 100 mg oral tablet; folic acid 1 mg oral tablet; furosemide 40 mg oral tablet; gabapentin 100 mg oral capsule; Humalog KwikPen Insulin 100 unit/mL subcutaneous insulin pen; ibuprofen 800 mg oral tablet; lisinopril 2.5 mg oral tablet; loratadine 10 mg oral tablet; lorazepam 1 mg oral tablet; metoprolol succinate 25 mg oral tablet extended release 24 hr; NuLYTELY with Flavor Packs 420 gram oral recon soln; omeprazole 40 mg oral capsule,delayed release(DR/EC); Plavix 75 mg oral tablet; ropinirole 0.25 mg oral tablet; rosuvastatin 20 mg oral tablet; sucralfate 1 gram oral tablet; theophylline 400 mg oral tablet extended release 24 hr         Allergy List  NO KNOWN DRUG ALLERGIES         Family Medical History  - No Family History of Colorectal Cancer; Family history of breast cancer; -Father's Family History Unknown; -Mother's Family History Unknown         Social History  Alcohol (Never); Caffeine (Current every day); Second hand smoke exposure (Never); Tobacco (Former)         Review of Systems  · Constitutional  o Denies  o : fever, headache, chills  · Eyes  o Denies  o : eye pain,  · HENT  o Denies  o : sinus problems, sore throat, ear infection  · Cardiovascular  o Denies  o : chest pain,   · Respiratory  o Admits  o : wheezing and soa at times  o Denies  o : frequent cough  · Gastrointestinal  o Admits  o : hemorrhoids  o Denies  o : nausea, vomiting, heartburn, indigestion, abdominal pain  · Genitourinary  o Admits  o : , nocturia, incontinence, painful urination  o Denies  o : urinary retention  · Integument  o Denies  o : , boils  · Neurologic  o Denies  o : tingling or numbness, tremors, dizzy spells  · Musculoskeletal  o Denies  o : neck pain, back  "pain  · Endocrine  o Denies  o : cold intolerance, heat intolerance, tired, excessive thirst, sluggish  · Psychiatric  o Admits  o : feels satisfied with life  o Denies  o : severe depression, concerns with hurting themselves  · Allergic-Immunologic  o Denies  o : sinus allergy symptoms, hay fever      Vitals  Date Time BP Position Site L\R Cuff Size HR RR TEMP (F) WT  HT  BMI kg/m2 BSA m2 O2 Sat FR L/min FiO2        03/10/2021 01:17 /63 Sitting    86 - R  97.4 171lbs 0oz 5'  4\" 29.35 1.87             Physical Examination  · Constitutional  o Appearance  o : Well nourished, well developed patient in no acute distress. Ambulating with difficulty.  · Respiratory  o Respiratory Effort  o : Breathing is unlabored without accessory muscle use  o Inspection of Chest  o : normal appearance, no retractions  o Auscultation of Lungs  o : wheezing throughout  · Cardiovascular  o Heart  o :   § Auscultation of Heart  § : regular rate and rhythm,   · Gastrointestinal  o Abdominal Examination  o : Scaphoid abdomen obese which is non-tender to palpation with normal tone and without rigidity or guarding. Normal bowel sounds. No masses present.  o Hernias  o : No abdominal wall hernias are present.  · Genitourinary  o External Genitalia  o : vaginal candida and excoriation inner labia majora  o Vagina  o : atrophic vaginitis and inflammation see plan  o Urethra  o : mild inflammation erythema  o Bladder  o : Non-tender to palpation without distension.  o Cervix  o : kenia very small introitus and unable to tolerate speculum  o Uterus  o : kenia   o Adnexa  o : No adnexal tenderness present, no adnexal masses present, ovaries not palpable  · Neurologic  o Mental Status Examination  o : oriented to person, place and time difficulty with recall and memory  o Gait and Station  o : normal gait, able to stand without difficulty  · Psychiatric  o Mood and Affect  o : mood normal, affect appropriate      Figure 1.0: Pain Rating " [facility]         Results  · In-Office Procedures  o Lab procedure  § Automated dipstick urinalysis with microscopy (32799)   § Color Ur: Yellow   § Clarity Ur: Cloudy   § Glucose Ur Ql Strip: Negative   § Bilirub Ur Ql Strip: Negative   § Ketones Ur Ql Strip: Negative   § Sp Gr Ur Qn: 1.020   § Hgb Ur Ql Strip: Small   § pH Ur-LsCnc: 5.5   § Prot Ur Ql Strip: 100 mg/dL   § Urobilinogen Ur Strip-mCnc: 0.2 E.U./dL   § Nitrite Ur Ql Strip: Negative   § WBC Est Ur Ql Strip: Large   § RBC UrnS Qn HPF: 0   § WBC UrnS Qn HPF: tntc   § Bacteria UrnS Qn HPF: 1+   § Crystals UrnS Qn HPF: amorphous   § Epithelial Cells (non renal): vaginal /HPF  § Epithelial Cells (renal): 0       Assessment  · Atrophic Vaginitis     627.3/N95.2  · Diabetes     250.92  · Recurrent UTI     599.0/N39.0  · CKD (chronic kidney disease)     585.9/N18.9  · Prolapsed hemorrhoids     455.8/K64.8  · Vagina, candidiasis     112.1/B37.3  · History of CVA with residual deficit     438.9/I69.30    Problems Reconciled  Plan  · Orders  o CT abdomen and pelvis; without contrast material (72238) - 599.0/N39.0, 585.9/N18.9, 627.3/N95.2, 455.8/K64.8 - 03/10/2021  o Urine Culture (Clean Catch) [facility] (28510) - 599.0/N39.0, 585.9/N18.9, 627.3/N95.2, 455.8/K64.8 - 03/10/2021  o Catheterization for residual urine (69701) - 599.0/N39.0, 585.9/N18.9 - 03/10/2021   60cc st cath sample sent for urine culture to avoid contamination  · Medications  o fluconazole 150 mg oral tablet   SI po daily x 3 days   DISP: (3) Tablet with 0 refills  Prescribed on 03/10/2021     o estradiol 0.01 % (0.1 mg/gram) vaginal cream   SIG: insert 0.5 gram by vaginal route 3 times per week   DISP: (1) Tube with 3 refills  Prescribed on 03/10/2021     o Anusol-HC 2.5 % topical cream with perineal applicator   SIG: apply to affected area twice a day as directed   DISP: (1) Tube with 0 refills  Prescribed on 03/10/2021     o Medications have been Reconciled  o Transition of Care or Provider  Policy     will get ct to be sure no upper tract problems or renal calculi due to recurrent uti and ckd. patient has significant inflammation and excoriation inner labial major and very small introitus. very difficult to visualize meatal opening due to body habitus and vaginal atrophy.  meatal position makes it difficult to obtain clean catch specimen no cath sample obtained with great difficulty. no visable cystourethrocele. vaginitis atrophic and candida. excoriation may also be exacerbated by urine exposure during voiding. very large and lobular prolapsed rectal hemorrhoid with inflammation. Advised evaluation with general surgeon for possible intervention.  will begin small amount of estrogen vaginal cream to help with inflammation meatus and atropy. This is causing her pain and burning. follow up with dr Flood after ct  in about 3 weeks and discussed having cystoscopy. I am not sure patient can tolerate procedure in office due to her anatomy. sending st cath urine for culture and recommend probiotic to help with vaginal and gi health. only approximately 60cc urine in bladder after voiding approximately 1 hour prior which indicates no significant retention. very complicated visit and spent minimum 50 minutes face to face for exam and history             Electronically Signed by: PADMINI Conde -Author on March 10, 2021 05:21:26 PM

## 2021-05-11 NOTE — H&P
History and Physical      Patient Name: Laury Palacios   Patient ID: [id number]   Sex: Female   YOB: 1930    Primary Care Provider: Doni Antunez MD   Referring Provider: Doni Antunez MD    Visit Date: April 21, 2021    Provider: Dolores Ga MD   Location: [facility]   Location Address: [address] [place] [address]  [id number]   Location Phone: [phone]          Chief Complaint  · Outpatient History & Physical / Surgical Orders  · Hemorrhoids      History Of Present Illness  Laury Palacios is a 89 year old /White female who is referred by Doni Antunez MD and Rosi Flood MD and who reports rectal bleeding after defecation and proloapsing rectal lesions while straining. In addition, patient reports rectal pain and itching.       Past Medical History  Arthritis; Asthma; Bladder disorder; Diabetes; Dysuria; Hemorrhoids; High blood pressure; Limb Pain; Limb Swelling; Rectal bleeding; Stroke; Stuttering; Ulcer         Past Surgical History  Colonoscopy; EGD; Gallbladder         Medication List  amlodipine 2.5 mg oral tablet; Anusol-HC 2.5 % topical cream with perineal applicator; doxycycline hyclate 100 mg oral tablet; estradiol 0.01 % (0.1 mg/gram) vaginal cream; fluconazole 150 mg oral tablet; folic acid 1 mg oral tablet; furosemide 40 mg oral tablet; gabapentin 100 mg oral capsule; Humalog KwikPen Insulin 100 unit/mL subcutaneous insulin pen; ibuprofen 800 mg oral tablet; Keflex 500 mg oral capsule; lisinopril 2.5 mg oral tablet; loratadine 10 mg oral tablet; lorazepam 1 mg oral tablet; metoprolol succinate 25 mg oral tablet extended release 24 hr; NuLYTELY with Flavor Packs 420 gram oral recon soln; omeprazole 40 mg oral capsule,delayed release(DR/EC); Plavix 75 mg oral tablet; ropinirole 0.25 mg oral tablet; rosuvastatin 20 mg oral tablet; sucralfate 1 gram oral tablet; theophylline 400 mg oral tablet extended release 24 hr         Allergy  List  NO KNOWN DRUG ALLERGIES       Allergies Reconciled  Family Medical History  - No Family History of Colorectal Cancer; Family history of breast cancer; -Father's Family History Unknown; -Mother's Family History Unknown         Social History  Alcohol (Never); Caffeine (Current every day); Second hand smoke exposure (Never); Tobacco (Former)         Review of Systems  · Cardiovascular  o Denies  o : chest pain on exertion, shortness of breath, lower extremity swelling  · Respiratory  o Denies  o : wheezing, chronic cough, coughing up blood  · Gastrointestinal  o Denies  o : chronic abdominal pain, reflux symptoms, diarrhea  · Genitourinary  o * See HPI      Physical Examination  · Constitutional  o Appearance  o : well developed/well nourished patient in no apparent distress  · Respiratory  o Respiratory Effort  o : breathing unlabored  o Inspection of Chest  o : equal breaths bilaterally  · Gastrointestinal  o Abdominal Examination  o : soft/nontender, nondistended, no organomegaly appreciated  · Genitourinary  o Digital Rectal Examination  o : + large external and internal hemorrhoids on inspection          Assessment  · Hemorrhoids, External     455.3/K64.8      Plan  · Orders  o General Surgery Order (GENOR) - 455.3/K64.8 - 05/18/2021  · Medications  o Medications have been Reconciled  o Transition of Care or Provider Policy  · Instructions  o Surgical Facility: [facility]  o Handouts Provided  o PLAN: Hemorrhoidectomy. Patient understands risk/benefits and is willing to proceed. Patient understands risks include, but are not limited to, pain, urinary retention, bleeding, infection, damage to surrounding structures, including but not limited to, external anal sphincter resulting in fecal incontinence and/or anal stenosis. In addition, patient understands the risk of reoccurance of hemorrhoids is 5 %.  o ****Surgical Orders****  o RISK AND BENEFITS:  o Consent for surgery: Given these options,  the patient has verbally expressed an understanding of the risks of surgery and finds these risks acceptable. We will proceed with surgery as soon as possible.  o PREP: Per protocol  o IV: Per Anesthesia  o Please sign permit for: Hemorrhoidectomy with possible banding  o *******************************************  o PRE- OP MEDICATION ORDER:  o *******************************************  o Kefzol 2 grams IV on call to OR.  o The above History and Physical Examination has been completed within 30 days of admission.  o ****Patient Status****  o Outpatient            Electronically Signed by: Dolores Ga MD -Author on April 21, 2021 10:03:01 AM

## 2021-05-14 VITALS
TEMPERATURE: 97.4 F | WEIGHT: 171 LBS | HEIGHT: 64 IN | BODY MASS INDEX: 29.19 KG/M2 | DIASTOLIC BLOOD PRESSURE: 56 MMHG | HEART RATE: 67 BPM | SYSTOLIC BLOOD PRESSURE: 154 MMHG

## 2021-05-14 VITALS — RESPIRATION RATE: 15 BRPM | HEIGHT: 64 IN | WEIGHT: 168 LBS | BODY MASS INDEX: 28.68 KG/M2

## 2021-05-14 VITALS
HEIGHT: 64 IN | WEIGHT: 171 LBS | SYSTOLIC BLOOD PRESSURE: 158 MMHG | TEMPERATURE: 97.4 F | HEART RATE: 86 BPM | BODY MASS INDEX: 29.19 KG/M2 | DIASTOLIC BLOOD PRESSURE: 63 MMHG

## 2021-05-16 VITALS — RESPIRATION RATE: 14 BRPM | BODY MASS INDEX: 29.19 KG/M2 | WEIGHT: 171 LBS | HEIGHT: 64 IN

## 2021-05-21 ENCOUNTER — HOSPITAL ENCOUNTER (OUTPATIENT)
Dept: UROLOGY | Facility: CLINIC | Age: 86
Discharge: HOME | End: 2021-05-21
Attending: UROLOGY

## 2021-05-24 LAB
AMPICILLIN SUSC ISLT: >=32
AMPICILLIN+SULBAC SUSC ISLT: >=32
BACTERIA UR CULT: ABNORMAL
CEFAZOLIN SUSC ISLT: 16
CEFEPIME SUSC ISLT: <=0.12
CEFTAZIDIME SUSC ISLT: <=1
CEFTRIAXONE SUSC ISLT: <=0.25
CIPROFLOXACIN SUSC ISLT: >=4
ERTAPENEM SUSC ISLT: <=0.12
GENTAMICIN SUSC ISLT: >=16
LEVOFLOXACIN SUSC ISLT: >=8
NITROFURANTOIN SUSC ISLT: <=16
PIP+TAZO SUSC ISLT: 16
TMP SMX SUSC ISLT: >=320
TOBRAMYCIN SUSC ISLT: 8

## 2021-06-01 ENCOUNTER — HOSPITAL ENCOUNTER (OUTPATIENT)
Dept: PERIOP | Facility: HOSPITAL | Age: 86
Setting detail: HOSPITAL OUTPATIENT SURGERY
Discharge: HOME | End: 2021-06-01
Attending: SURGERY

## 2021-06-01 LAB
GLUCOSE BLD-MCNC: 106 MG/DL (ref 65–99)
GLUCOSE BLD-MCNC: 127 MG/DL (ref 65–99)

## 2021-06-05 NOTE — PROCEDURES
Patient: KARLY PEREZ     Acct: [id number]     Report: #[id number]  MR #:  [id number]     DOS: 2021 0756     : 1930  DICTATING: DOLORES TOAVR MD  ***Signed***  --------------------------------------------------------------------------------------------------------------------  Post Procedure/Operative Note      Date       21            Pre-Operative Diagnosis:      External and internal hemorrhoids            Post-Operative Diagnosis:      Same as pre-op diagnosis            Surgeon/Assistants      Surgeon      Dolores Wilson            Anesthesia      General            Procedure Performed/Technique      Hemorrhoidectomy x 2 and hemorrhoid banding x 2            Specimen/Tissue Removed:      Yes            Hemorrhoids iwth internal and external components x 2            Findings:      None            Complications:      None            Estimated Blood Loss:      Yes      5            Procedure:      On the morning the procedure after informed consent is been obtained the patient    was taken to the OR placed upon the table and after adequate anesthesia placed     in lithotomy position and prepped and draped in the usual sterile fashion.  We     began by inserting the anoscope were able to see she had a large hemorrhoid at     12:00 and the second at 9:00 both with internal components and larger external     components, a smaller third hemorrhoid at 5:00, and a fourth at 3:00.  The first    hemorrhoid at 5:00 was banded without complications and in the standard fashion     ensuring that the band was placed firmly at the base of the hemorrhoid without     including any anoderm.  We then proceeded to band the hemorrhoid at 3:00 in the     same fashion.  The anoscope was reinserted and we ensured that all of the band     placements were good.  We then were able to visualize the larger hemorrhoid at     12 and 9:00 and these were removed by  hemorrhoidectomy.  We began by grasping     the 12:00 hemorrhoid at the apex with a Allis clamp and scoring the anoderm with    electrocautery.  We then used the LigaSure to remove both internal and external     components of the hemorrhoid.  This was handed off as a specimen.  We then     repeated the exact same procedure for the hemorrhoid at 9:00.  With both inter    nal and external components of the hemorrhoid being handed off as well.  We then    reapproximated the anal mucosa at both sites using a 2-0 Vicryl beginning at the    apex and closing in a running fashion until reach the anoderm which was also     approximated.  The patient was awakened and taken to recovery room in good     condition after local anesthetic of been injected.  She will follow-up with me     in 1-2 weeks.            JOSELUIS TOVAR MD           Jun 1, 2021 07:56      Electronically signed by JOSELUIS TOVAR MD  06/01/2021 07:56     Disclaimer: Converted hospital document may not contain table formatting or lab diagrams. Please see [employer] for authenticated document.

## 2021-06-09 ENCOUNTER — OFFICE VISIT (OUTPATIENT)
Dept: SURGERY | Facility: CLINIC | Age: 86
End: 2021-06-09

## 2021-06-09 VITALS — WEIGHT: 166.2 LBS | HEIGHT: 66 IN | BODY MASS INDEX: 26.71 KG/M2 | RESPIRATION RATE: 16 BRPM

## 2021-06-09 DIAGNOSIS — Z87.19 HX OF HEMORRHOIDS: Primary | ICD-10-CM

## 2021-06-09 PROBLEM — M79.89 LIMB SWELLING: Status: ACTIVE | Noted: 2021-06-09

## 2021-06-09 PROBLEM — E11.21 DIABETIC RENAL DISEASE: Status: ACTIVE | Noted: 2021-06-09

## 2021-06-09 PROBLEM — K64.9 HEMORRHOIDS: Status: ACTIVE | Noted: 2021-06-09

## 2021-06-09 PROBLEM — G25.81 RESTLESS LEGS SYNDROME: Status: ACTIVE | Noted: 2021-06-09

## 2021-06-09 PROBLEM — I63.9 STROKE: Status: ACTIVE | Noted: 2021-06-09

## 2021-06-09 PROBLEM — K21.00 GASTRO-ESOPHAGEAL REFLUX DISEASE WITH ESOPHAGITIS: Status: ACTIVE | Noted: 2021-06-09

## 2021-06-09 PROBLEM — R30.0 DIFFICULT OR PAINFUL URINATION: Status: ACTIVE | Noted: 2021-06-09

## 2021-06-09 PROBLEM — IMO0001 ULCER DISEASE: Status: ACTIVE | Noted: 2021-06-09

## 2021-06-09 PROBLEM — K62.5 RECTAL BLEEDING: Status: ACTIVE | Noted: 2021-06-09

## 2021-06-09 PROBLEM — N32.9 BLADDER DISORDER: Status: ACTIVE | Noted: 2021-06-09

## 2021-06-09 PROBLEM — G62.9 NEUROPATHY: Status: ACTIVE | Noted: 2021-06-09

## 2021-06-09 PROBLEM — M19.90 OSTEOARTHRITIS: Status: ACTIVE | Noted: 2021-06-09

## 2021-06-09 PROBLEM — J44.9 CHRONIC OBSTRUCTIVE PULMONARY DISEASE: Status: ACTIVE | Noted: 2021-06-09

## 2021-06-09 PROBLEM — F80.81 STUTTERING: Status: ACTIVE | Noted: 2021-06-09

## 2021-06-09 PROBLEM — Z79.4 LONG TERM CURRENT USE OF INSULIN: Status: ACTIVE | Noted: 2021-06-09

## 2021-06-09 PROBLEM — F41.1 GENERALIZED ANXIETY DISORDER: Status: ACTIVE | Noted: 2021-06-09

## 2021-06-09 PROBLEM — J45.909 ASTHMA: Status: ACTIVE | Noted: 2021-06-09

## 2021-06-09 PROBLEM — R60.9 EDEMA: Status: ACTIVE | Noted: 2021-06-09

## 2021-06-09 PROCEDURE — 99024 POSTOP FOLLOW-UP VISIT: CPT | Performed by: SURGERY

## 2021-06-09 RX ORDER — OMEPRAZOLE 40 MG/1
CAPSULE, DELAYED RELEASE ORAL
Status: ON HOLD | COMMUNITY
End: 2021-12-15

## 2021-06-09 RX ORDER — OXYCODONE HYDROCHLORIDE AND ACETAMINOPHEN 5; 325 MG/1; MG/1
2 TABLET ORAL EVERY 6 HOURS PRN
Qty: 25 TABLET | Refills: 0 | Status: ON HOLD | OUTPATIENT
Start: 2021-06-09 | End: 2021-12-15

## 2021-06-09 RX ORDER — NYSTATIN 100000 U/G
CREAM TOPICAL
COMMUNITY
Start: 2021-05-17 | End: 2021-11-19 | Stop reason: SDUPTHER

## 2021-06-09 RX ORDER — LORAZEPAM 0.5 MG/1
0.5 TABLET ORAL 2 TIMES DAILY PRN
COMMUNITY
Start: 2021-05-23

## 2021-06-09 RX ORDER — OMEPRAZOLE 20 MG/1
20 CAPSULE, DELAYED RELEASE ORAL DAILY
COMMUNITY
Start: 2021-06-03

## 2021-06-09 NOTE — PROGRESS NOTES
"Chief Complaint  Post-op Follow-up (hemorrhoid)    Subjective          History of Present Illness  The patient is here to follow up on hemorrhoidectomy..  They are doing well and have no complaints.  Pathology was hemorrhoids.    SCANNED PATHOLOGY (06/01/2021)    Objective   Vital Signs:   Resp 16   Ht 167.6 cm (66\")   Wt 75.4 kg (166 lb 3.2 oz)   BMI 26.83 kg/m²     Physical Exam  Vitals and nursing note reviewed.   Constitutional:       General: She is not in acute distress.     Appearance: Normal appearance. She is well-developed.   HENT:      Head: Normocephalic and atraumatic.   Eyes:      Extraocular Movements: Extraocular movements intact.      Pupils: Pupils are equal, round, and reactive to light.   Cardiovascular:      Pulses: Normal pulses.   Pulmonary:      Effort: Pulmonary effort is normal. No retractions.      Breath sounds: Normal air entry. No wheezing.   Abdominal:      General: Bowel sounds are normal. There is no distension.      Palpations: Abdomen is soft.      Tenderness: There is no abdominal tenderness.      Hernia: No hernia is present.   Musculoskeletal:         General: No swelling or deformity.      Cervical back: Neck supple.   Skin:     General: Skin is warm and dry.      Findings: No erythema.      Comments: Surgical Incision Healing Well   Neurological:      General: No focal deficit present.      Mental Status: She is alert and oriented to person, place, and time.      Motor: Motor function is intact.   Psychiatric:         Mood and Affect: Mood normal.         Thought Content: Thought content normal.            Result Review                  Assessment and Plan    Diagnoses and all orders for this visit:    1. Hx of hemorrhoids (Primary)      Will continue with pain control and sitz bath's.  Follow Up   Return in about 1 week (around 6/16/2021).  Patient was given instructions and counseling regarding her condition or for health maintenance advice. Please see specific " information pulled into the AVS if appropriate.

## 2021-06-15 DIAGNOSIS — N39.0 URINARY TRACT INFECTION WITHOUT HEMATURIA, SITE UNSPECIFIED: Primary | ICD-10-CM

## 2021-06-15 PROCEDURE — 87086 URINE CULTURE/COLONY COUNT: CPT | Performed by: UROLOGY

## 2021-06-15 RX ORDER — INSULIN ASPART 100 [IU]/ML
INJECTION, SOLUTION INTRAVENOUS; SUBCUTANEOUS
Status: ON HOLD | COMMUNITY
End: 2021-12-15

## 2021-06-15 RX ORDER — INSULIN LISPRO 100 [IU]/ML
INJECTION, SOLUTION INTRAVENOUS; SUBCUTANEOUS
Status: ON HOLD | COMMUNITY
End: 2021-12-15

## 2021-06-15 RX ORDER — CLOPIDOGREL BISULFATE 75 MG/1
TABLET ORAL
Status: ON HOLD | COMMUNITY
End: 2021-12-15

## 2021-06-15 RX ORDER — THEOPHYLLINE 400 MG/1
400 TABLET, EXTENDED RELEASE ORAL DAILY
COMMUNITY
Start: 2021-05-11

## 2021-06-15 RX ORDER — INSULIN DETEMIR 100 [IU]/ML
INJECTION, SOLUTION SUBCUTANEOUS
Status: ON HOLD | COMMUNITY
End: 2021-12-15

## 2021-06-15 RX ORDER — ROPINIROLE 0.25 MG/1
TABLET, FILM COATED ORAL
Status: ON HOLD | COMMUNITY
End: 2021-12-15

## 2021-06-15 RX ORDER — CEPHALEXIN 500 MG/1
CAPSULE ORAL
COMMUNITY
Start: 2021-04-09 | End: 2021-11-19

## 2021-06-15 RX ORDER — SUCRALFATE 1 G/1
TABLET ORAL
Status: ON HOLD | COMMUNITY
End: 2021-12-15

## 2021-06-15 RX ORDER — ATORVASTATIN CALCIUM 10 MG/1
TABLET, FILM COATED ORAL
Status: ON HOLD | COMMUNITY
End: 2021-12-15

## 2021-06-15 RX ORDER — IBUPROFEN 800 MG/1
TABLET ORAL
Status: ON HOLD | COMMUNITY
End: 2021-12-15

## 2021-06-15 RX ORDER — AMOXICILLIN AND CLAVULANATE POTASSIUM 500; 125 MG/1; MG/1
TABLET, FILM COATED ORAL
COMMUNITY
End: 2021-11-19

## 2021-06-15 RX ORDER — LEVOFLOXACIN 500 MG/1
TABLET, FILM COATED ORAL
Status: ON HOLD | COMMUNITY
End: 2021-12-15

## 2021-06-15 RX ORDER — DICLOFENAC 16.05 MG/ML
SOLUTION TOPICAL
Status: ON HOLD | COMMUNITY
End: 2021-12-15

## 2021-06-15 RX ORDER — METOPROLOL SUCCINATE 25 MG/1
TABLET, EXTENDED RELEASE ORAL
Status: ON HOLD | COMMUNITY
End: 2021-12-15

## 2021-06-15 RX ORDER — PEN NEEDLE, DIABETIC 31 GX3/16"
NEEDLE, DISPOSABLE MISCELLANEOUS
Status: ON HOLD | COMMUNITY
Start: 2021-04-19 | End: 2021-12-15

## 2021-06-15 RX ORDER — NITROFURANTOIN 25; 75 MG/1; MG/1
CAPSULE ORAL
COMMUNITY
End: 2021-12-09

## 2021-06-15 RX ORDER — MULTIPLE VITAMINS W/ MINERALS TAB 9MG-400MCG
TAB ORAL
COMMUNITY
End: 2021-12-09

## 2021-06-15 RX ORDER — ALBUTEROL SULFATE 90 UG/1
AEROSOL, METERED RESPIRATORY (INHALATION)
Status: ON HOLD | COMMUNITY
End: 2021-12-15

## 2021-06-15 RX ORDER — SULFAMETHOXAZOLE AND TRIMETHOPRIM 800; 160 MG/1; MG/1
TABLET ORAL
COMMUNITY
Start: 2021-03-15 | End: 2021-12-09

## 2021-06-15 RX ORDER — AMLODIPINE BESYLATE 2.5 MG/1
2.5 TABLET ORAL
Status: ON HOLD | COMMUNITY
Start: 2021-04-17 | End: 2021-12-15

## 2021-06-15 RX ORDER — FLUCONAZOLE 100 MG/1
TABLET ORAL
Status: ON HOLD | COMMUNITY
Start: 2021-04-21 | End: 2021-12-15

## 2021-06-15 RX ORDER — CEPHALEXIN 250 MG/1
CAPSULE ORAL
COMMUNITY
Start: 2021-05-25 | End: 2021-11-19

## 2021-06-15 RX ORDER — GABAPENTIN 400 MG/1
CAPSULE ORAL
Status: ON HOLD | COMMUNITY
End: 2021-12-15

## 2021-06-15 RX ORDER — BUDESONIDE AND FORMOTEROL FUMARATE DIHYDRATE 160; 4.5 UG/1; UG/1
AEROSOL RESPIRATORY (INHALATION)
Status: ON HOLD | COMMUNITY
End: 2021-12-15

## 2021-06-15 RX ORDER — LEVOFLOXACIN 750 MG/1
TABLET ORAL
Status: ON HOLD | COMMUNITY
Start: 2021-05-10 | End: 2021-12-15

## 2021-06-15 RX ORDER — ROPINIROLE 0.25 MG/1
TABLET, FILM COATED ORAL
COMMUNITY
Start: 2021-04-17 | End: 2021-12-09

## 2021-06-15 RX ORDER — LORATADINE 10 MG/1
TABLET ORAL
Status: ON HOLD | COMMUNITY
End: 2021-12-15

## 2021-06-15 RX ORDER — MULTIPLE VITAMINS W/ MINERALS TAB 9MG-400MCG
1 TAB ORAL DAILY
COMMUNITY

## 2021-06-15 RX ORDER — ESTRADIOL 0.1 MG/G
CREAM VAGINAL
COMMUNITY
Start: 2021-03-10 | End: 2021-09-16 | Stop reason: SDUPTHER

## 2021-06-15 RX ORDER — FLUTICASONE PROPIONATE 50 MCG
SPRAY, SUSPENSION (ML) NASAL
Status: ON HOLD | COMMUNITY
End: 2021-12-15

## 2021-06-15 RX ORDER — DOXYCYCLINE HYCLATE 100 MG
TABLET ORAL
Status: ON HOLD | COMMUNITY
End: 2021-12-15

## 2021-06-16 ENCOUNTER — OFFICE VISIT (OUTPATIENT)
Dept: SURGERY | Facility: CLINIC | Age: 86
End: 2021-06-16

## 2021-06-16 VITALS — RESPIRATION RATE: 16 BRPM | BODY MASS INDEX: 26.45 KG/M2 | HEIGHT: 66 IN | WEIGHT: 164.6 LBS

## 2021-06-16 DIAGNOSIS — K64.9 HEMORRHOIDS, UNSPECIFIED HEMORRHOID TYPE: Primary | ICD-10-CM

## 2021-06-16 LAB — BACTERIA SPEC AEROBE CULT: NORMAL

## 2021-06-16 PROCEDURE — 99024 POSTOP FOLLOW-UP VISIT: CPT | Performed by: SURGERY

## 2021-06-16 NOTE — ASSESSMENT & PLAN NOTE
Continue with stool softener and will switch to Advil Tylenol for pain control.  Follow-up in 2 weeks

## 2021-06-16 NOTE — PROGRESS NOTES
".Chief Complaint  Post-op (hemorrhoid)    Subjective          History of Present Illness  The patient is here to follow up on hemorrhoidectomy and banding.  They are doing well and have no complaints.  Patient is doing very well.  Incision looks good.  She is able to have normal bowel movements and is tolerating a diet.  Objective   Vital Signs:   Resp 16   Ht 167 cm (65.75\")   Wt 74.7 kg (164 lb 9.6 oz)   BMI 26.77 kg/m²     Physical Exam  Vitals and nursing note reviewed.   Constitutional:       General: She is not in acute distress.     Appearance: Normal appearance. She is well-developed.   HENT:      Head: Normocephalic and atraumatic.   Eyes:      Extraocular Movements: Extraocular movements intact.      Pupils: Pupils are equal, round, and reactive to light.   Cardiovascular:      Pulses: Normal pulses.   Pulmonary:      Effort: Pulmonary effort is normal. No retractions.      Breath sounds: Normal air entry. No wheezing.   Abdominal:      General: Bowel sounds are normal. There is no distension.      Palpations: Abdomen is soft.      Tenderness: There is no abdominal tenderness.      Hernia: No hernia is present.   Musculoskeletal:         General: No swelling or deformity.      Cervical back: Neck supple.   Skin:     General: Skin is warm and dry.      Findings: No erythema.      Comments: Surgical Incision Healing Well   Neurological:      General: No focal deficit present.      Mental Status: She is alert and oriented to person, place, and time.      Motor: Motor function is intact.   Psychiatric:         Mood and Affect: Mood normal.         Thought Content: Thought content normal.            Result Review :              Assessment and Plan    Diagnoses and all orders for this visit:    1. Hemorrhoids, unspecified hemorrhoid type (Primary)  Assessment & Plan:  Continue with stool softener and will switch to Advil Tylenol for pain control.  Follow-up in 2 weeks        Follow Up   Return in about 2 " weeks (around 6/30/2021).  Patient was given instructions and counseling regarding her condition or for health maintenance advice. Please see specific information pulled into the AVS if appropriate.

## 2021-06-23 ENCOUNTER — OFFICE VISIT (OUTPATIENT)
Dept: UROLOGY | Facility: CLINIC | Age: 86
End: 2021-06-23

## 2021-06-23 DIAGNOSIS — N39.0 URINARY TRACT INFECTION WITHOUT HEMATURIA, SITE UNSPECIFIED: Primary | ICD-10-CM

## 2021-06-23 DIAGNOSIS — N32.9 BLADDER DISORDER: ICD-10-CM

## 2021-06-23 PROCEDURE — 87086 URINE CULTURE/COLONY COUNT: CPT | Performed by: UROLOGY

## 2021-06-23 PROCEDURE — 99213 OFFICE O/P EST LOW 20 MIN: CPT | Performed by: UROLOGY

## 2021-06-23 NOTE — PROGRESS NOTES
Chief Complaint  Urinary Tract Infection (here for straight cath for urine culture)    Patient had multiple bacteria in the culture with possible contamination from vaginal discharge    Subjective .  She still feels like there is something in the urinary bladder and dysuria        Laury Palacios presents to [facility] UROLOG[facility]  History of Present Illness    Has been having recurrent urinary tract infection and she is diabetic.  Her CT scan was done a few months ago which did not reveal any renal or ureteral calculi.  She is for cystoscopy while she is on antibiotics in the office    Objective   Vital Signs:   There were no vitals taken for this visit.    Allergies   Allergen Reactions   • Codeine Nausea And Vomiting   • Morphine Nausea And Vomiting   • Ciprofloxacin Rash      Review of Systems   Constitutional:        89-year-old white female.  Has difficulty with ambulation because of her age   HENT: Negative.    Respiratory: Negative.    Gastrointestinal: Positive for abdominal pain.   Endocrine: Negative.    Genitourinary: Positive for dysuria, frequency and urgency.   Musculoskeletal: Positive for back pain and gait problem.   Skin: Negative.    Neurological: Positive for dizziness, weakness and light-headedness.   Hematological: Negative.    Psychiatric/Behavioral: Negative.         Physical Exam  Constitutional:       Appearance: Normal appearance.      Comments: 89-year-old white female who has difficulty with ambulation   HENT:      Head: Normocephalic and atraumatic.      Nose: Nose normal.   Abdominal:      General: Abdomen is flat.      Tenderness: There is abdominal tenderness.      Comments: Suprapubic area is tender   Genitourinary:     General: Normal vulva.      Vagina: No vaginal discharge.   Skin:     General: Skin is warm.   Neurological:      General: No focal deficit present.      Mental Status: She is alert and oriented to person, place, and time.   Psychiatric:         Mood and  Affect: Mood normal.         Behavior: Behavior normal.         Thought Content: Thought content normal.         Judgment: Judgment normal.        Result Review :                 Assessment and Plan    Diagnoses and all orders for this visit:    1. Urinary tract infection without hematuria, site unspecified (Primary)  -     Urine Culture - Urine, Urine, Catheter In/Out    2. Bladder disorder    I have sent her urine for culture after catheterization will treat her with antibiotics and do cystoscopy in 2 weeks    Follow Up   Return in about 2 weeks (around 7/7/2021).  Patient was given instructions and counseling regarding her condition or for health maintenance advice. Please see specific information pulled into the AVS if appropriate.     Rosi Flood MD

## 2021-06-24 LAB — BACTERIA SPEC AEROBE CULT: ABNORMAL

## 2021-07-07 ENCOUNTER — OFFICE VISIT (OUTPATIENT)
Dept: SURGERY | Facility: CLINIC | Age: 86
End: 2021-07-07

## 2021-07-07 VITALS — HEIGHT: 63 IN | WEIGHT: 164 LBS | BODY MASS INDEX: 29.06 KG/M2

## 2021-07-07 DIAGNOSIS — K64.9 HEMORRHOIDS, UNSPECIFIED HEMORRHOID TYPE: Primary | ICD-10-CM

## 2021-07-07 PROCEDURE — 99212 OFFICE O/P EST SF 10 MIN: CPT | Performed by: SURGERY

## 2021-07-07 RX ORDER — CEFUROXIME AXETIL 500 MG/1
TABLET ORAL
COMMUNITY
Start: 2021-06-26 | End: 2021-11-19

## 2021-07-07 NOTE — PROGRESS NOTES
"Chief Complaint  Follow-up    Subjective          History of Present Illness  The patient is here to follow up on hemorrhoidectomy.  They are doing well and have no complaints. Patient is doing much better with no current complaints.  Objective   Vital Signs:   Ht 160 cm (63\")   Wt 74.4 kg (164 lb)   BMI 29.05 kg/m²     Physical Exam  Vitals and nursing note reviewed.   Constitutional:       General: She is not in acute distress.     Appearance: Normal appearance. She is well-developed.   HENT:      Head: Normocephalic and atraumatic.   Eyes:      Extraocular Movements: Extraocular movements intact.      Pupils: Pupils are equal, round, and reactive to light.   Cardiovascular:      Pulses: Normal pulses.   Pulmonary:      Effort: Pulmonary effort is normal. No retractions.      Breath sounds: Normal air entry. No wheezing.   Abdominal:      General: There is no distension.      Palpations: Abdomen is soft.      Tenderness: There is no abdominal tenderness.      Hernia: No hernia is present.   Musculoskeletal:         General: No swelling or deformity.      Cervical back: Neck supple.   Skin:     General: Skin is warm and dry.      Findings: No erythema.      Comments: Surgical Incision Healing Well   Neurological:      General: No focal deficit present.      Mental Status: She is alert and oriented to person, place, and time.      Motor: Motor function is intact.   Psychiatric:         Mood and Affect: Mood normal.         Thought Content: Thought content normal.            Result Review :              Assessment and Plan    Diagnoses and all orders for this visit:    1. Hemorrhoids, unspecified hemorrhoid type (Primary)  Assessment & Plan:  Follow-up as needed        Follow Up   Return if symptoms worsen or fail to improve.  Patient was given instructions and counseling regarding her condition or for health maintenance advice. Please see specific information pulled into the AVS if appropriate.     "

## 2021-07-09 ENCOUNTER — OFFICE VISIT (OUTPATIENT)
Dept: UROLOGY | Facility: CLINIC | Age: 86
End: 2021-07-09

## 2021-07-09 DIAGNOSIS — N39.0 URINARY TRACT INFECTION WITHOUT HEMATURIA, SITE UNSPECIFIED: Primary | ICD-10-CM

## 2021-07-09 PROCEDURE — 52000 CYSTOURETHROSCOPY: CPT | Performed by: UROLOGY

## 2021-07-14 NOTE — PROGRESS NOTES
Procedures       Patient was placed in lithotomy position.  Thorough scrubbing of lower abdomen and external genitalia was performed with Hibiclens.  Flexible Olympus cystoscope was inserted into the urethra.  Urethra looks fine and bladder neck is normal.  Both ureteral orifices are normal.  Bladder was checked very carefully and there is no tumor present.  Patient has obvious cystourethrocele.  After filling her up fluid we remove the cystoscope.  We made her cough and she leaked significant amount of fluid.  She tolerated her procedure very well.

## 2021-07-16 ENCOUNTER — CLINICAL SUPPORT (OUTPATIENT)
Dept: UROLOGY | Facility: CLINIC | Age: 86
End: 2021-07-16

## 2021-07-23 ENCOUNTER — CLINICAL SUPPORT (OUTPATIENT)
Dept: UROLOGY | Facility: CLINIC | Age: 86
End: 2021-07-23

## 2021-07-23 DIAGNOSIS — N32.9 BLADDER DISORDER: ICD-10-CM

## 2021-07-23 DIAGNOSIS — N30.00 ACUTE CYSTITIS WITHOUT HEMATURIA: ICD-10-CM

## 2021-07-23 PROCEDURE — 51700 IRRIGATION OF BLADDER: CPT | Performed by: UROLOGY

## 2021-07-23 NOTE — PROGRESS NOTES
Patient has recurrent urinary tract infection.  She has a lot of burning which seems to be sensitive urethra and vagina.    We have been using 160 mg of gentamicin intravesically for the patient every week.    After prepping the patient with Hibiclens I went ahead with insertion of 12 Mongolian red rubber catheter and injected 160 mg gentamicin in the urinary bladder.  Culture the urine as seem better than before and is clear.    I am going to advise her to use 2% Xylocaine jelly around the urethra.  Also use soda bicarb 1 teaspoon in water 3 times a day which might help her dysuria

## 2021-07-29 ENCOUNTER — CLINICAL SUPPORT (OUTPATIENT)
Dept: UROLOGY | Facility: CLINIC | Age: 86
End: 2021-07-29

## 2021-07-29 DIAGNOSIS — N30.00 ACUTE CYSTITIS WITHOUT HEMATURIA: Primary | ICD-10-CM

## 2021-07-29 LAB
BACTERIA UR QL AUTO: ABNORMAL /HPF
EPI CELLS #/AREA URNS HPF: 0 /[HPF]
RBC # UR STRIP: ABNORMAL /HPF
RENAL EPITHELIAL, POC: 0
UNIDENT CRYS URNS QL MICRO: ABNORMAL /HPF
WBC # UR STRIP: ABNORMAL /HPF

## 2021-07-29 PROCEDURE — 81015 MICROSCOPIC EXAM OF URINE: CPT | Performed by: UROLOGY

## 2021-07-29 PROCEDURE — 51700 IRRIGATION OF BLADDER: CPT | Performed by: UROLOGY

## 2021-07-29 NOTE — PROGRESS NOTES
89-year-old white female has recurrent urinary tract infection.  She had resistant UTI and we have been doing weekly gentamicin intravesically.  Under microscope she does have clumps of pus cells    We repeated intravesical gentamicin today.  Patient was placed in lithotomy position and we prepped her with Hibiclens.  12 Greenlandic red rubber catheter was inserted in the urinary bladder and bladder was empty.  160 mg gentamicin and 50 mL of normal saline was injected in the urinary bladder.  I also applied 2% Xylocaine around the urethra and labia to see if that will relieve her burning which is one of her complaints.    Patient has a 3 cm x 5 mm scaly area on her left leg which is scaly and she is concerned it might be cancer.  I have offered to do a biopsy on her next time when she comes in to be sure

## 2021-08-05 ENCOUNTER — PROCEDURE VISIT (OUTPATIENT)
Dept: UROLOGY | Facility: CLINIC | Age: 86
End: 2021-08-05

## 2021-08-05 DIAGNOSIS — N30.00 ACUTE CYSTITIS WITHOUT HEMATURIA: ICD-10-CM

## 2021-08-05 PROCEDURE — 51700 IRRIGATION OF BLADDER: CPT | Performed by: UROLOGY

## 2021-08-05 NOTE — PROGRESS NOTES
Recurrent urinary tract infection which are resistant    Instillation of gentamicin in the bladder.    Patient was placed in lithotomy position and we scrubbed the external genitalia with Hibiclens.  Using 12 Kazakh red rubber catheter the bladder was drained and she did not have more than a teaspoon of residual.  We instilled 160 mg of gentamicin and 50 mL of water in the urinary bladder.  She tolerated the procedure well.    We are going to do urine culture on her next time.

## 2021-08-17 ENCOUNTER — PROCEDURE VISIT (OUTPATIENT)
Dept: UROLOGY | Facility: CLINIC | Age: 86
End: 2021-08-17

## 2021-08-17 DIAGNOSIS — N39.0 RECURRENT UTI: Primary | ICD-10-CM

## 2021-08-17 RX ORDER — GENTAMICIN SULFATE 40 MG/ML
160 INJECTION, SOLUTION INTRAMUSCULAR; INTRAVENOUS ONCE
Status: DISCONTINUED | OUTPATIENT
Start: 2021-08-17 | End: 2021-12-15

## 2021-08-17 RX ORDER — LIDOCAINE HYDROCHLORIDE 20 MG/ML
6 SOLUTION OROPHARYNGEAL ONCE
Status: DISCONTINUED | OUTPATIENT
Start: 2021-08-17 | End: 2021-12-15

## 2021-08-17 RX ORDER — TRAMADOL HYDROCHLORIDE 50 MG/1
50 TABLET ORAL 2 TIMES DAILY
Status: ON HOLD | COMMUNITY
Start: 2021-08-04 | End: 2021-12-15

## 2021-08-17 NOTE — PROGRESS NOTES
Procedure   Procedures   Gentamicin bladder instillation    DX: Recurrent uti resistant.    Prep and drapped under sterile technique and topical anesthetic with 2% viscous lidocaine 6cc to meatal opening.  Urethral catheterization was performed without difficulty. 12fr rubber catheter was inserted and approximately 10cc of clear urine returned. Solution of 40cc sterile water and 160mg gentamicin was injected into the bladder. Patient to leave solution remaining for a minimum of 1 hour or until next void.

## 2021-09-16 ENCOUNTER — PROCEDURE VISIT (OUTPATIENT)
Dept: UROLOGY | Facility: CLINIC | Age: 86
End: 2021-09-16

## 2021-09-16 DIAGNOSIS — R32 URINARY INCONTINENCE, UNSPECIFIED TYPE: ICD-10-CM

## 2021-09-16 DIAGNOSIS — N39.0 RECURRENT UTI: Primary | ICD-10-CM

## 2021-09-16 DIAGNOSIS — N95.2 POST-MENOPAUSAL ATROPHIC VAGINITIS: Primary | ICD-10-CM

## 2021-09-16 RX ORDER — LIDOCAINE HYDROCHLORIDE 20 MG/ML
6 SOLUTION OROPHARYNGEAL ONCE
Status: DISCONTINUED | OUTPATIENT
Start: 2021-09-16 | End: 2021-12-15

## 2021-09-16 RX ORDER — GENTAMICIN SULFATE 40 MG/ML
160 INJECTION, SOLUTION INTRAMUSCULAR; INTRAVENOUS ONCE
Status: DISCONTINUED | OUTPATIENT
Start: 2021-09-16 | End: 2021-12-15

## 2021-09-16 RX ORDER — ESTRADIOL 0.1 MG/G
CREAM VAGINAL
Qty: 42.5 G | Refills: 2 | Status: SHIPPED | OUTPATIENT
Start: 2021-09-16 | End: 2022-01-01 | Stop reason: SDUPTHER

## 2021-09-16 RX ORDER — GENTAMICIN SULFATE 40 MG/ML
80 INJECTION, SOLUTION INTRAMUSCULAR; INTRAVENOUS ONCE
Status: DISCONTINUED | OUTPATIENT
Start: 2021-09-16 | End: 2021-12-15

## 2021-09-16 NOTE — PROGRESS NOTES
Irrigation of Bladder    Date/Time: 9/16/2021 1:32 PM  Performed by: Shamika Pandey APRN  Authorized by: Shamika Pandey APRN       DX Recurrent uti  Prep and drapped under sterile technique. Topical anesthetic with 2% vicous lidocaine 6cc inserted into meatal opening. Urethral catheterization performed without difficulty. 30cc urine collected into sterile specimen cup for culture. Solution of sterile water and 160mg gentamicin was injected into the bladder. Solution to remain for a minium of 1 hour or until next void.  Due to continued presence of bacteria in urine, Injection IM x 1 right glute 80mg admin.

## 2021-09-17 DIAGNOSIS — N39.0 RECURRENT UTI: Primary | ICD-10-CM

## 2021-09-17 PROCEDURE — 87086 URINE CULTURE/COLONY COUNT: CPT | Performed by: NURSE PRACTITIONER

## 2021-09-19 LAB — BACTERIA SPEC AEROBE CULT: NO GROWTH

## 2021-09-29 ENCOUNTER — PROCEDURE VISIT (OUTPATIENT)
Dept: UROLOGY | Facility: CLINIC | Age: 86
End: 2021-09-29

## 2021-09-29 DIAGNOSIS — N39.0 RECURRENT UTI: Primary | ICD-10-CM

## 2021-09-29 NOTE — PROGRESS NOTES
Indication of gentamicin  injection in the urinary bladder.  Recurrent and resistant UTI    Procedure.  Patient was placed in lithotomy position and thorough scrubbing of lower abdomen external genitalia was performed.  2% Xylocaine was injected in the urethra.  160 mg of gentamicin in 50 cc of normal saline was injected through a red rubber catheter in the urinary bladder.  Bladder was drained before injection of gentamicin the urine looked pretty clear compared to before.  Patient is going to hold his gentamicin in the urinary bladder for 2 hours.  We are going to reinject her in 1 month's time

## 2021-10-29 ENCOUNTER — PROCEDURE VISIT (OUTPATIENT)
Dept: UROLOGY | Facility: CLINIC | Age: 86
End: 2021-10-29

## 2021-10-29 DIAGNOSIS — R30.0 DYSURIA: ICD-10-CM

## 2021-10-29 DIAGNOSIS — N39.41 URGENCY INCONTINENCE: ICD-10-CM

## 2021-10-29 DIAGNOSIS — N39.0 RECURRENT UTI: Primary | ICD-10-CM

## 2021-10-29 PROCEDURE — 87186 SC STD MICRODIL/AGAR DIL: CPT | Performed by: NURSE PRACTITIONER

## 2021-10-29 PROCEDURE — 87086 URINE CULTURE/COLONY COUNT: CPT | Performed by: NURSE PRACTITIONER

## 2021-10-29 PROCEDURE — 51700 IRRIGATION OF BLADDER: CPT | Performed by: NURSE PRACTITIONER

## 2021-10-29 RX ORDER — PHENAZOPYRIDINE HYDROCHLORIDE 100 MG/1
100 TABLET, FILM COATED ORAL 2 TIMES DAILY PRN
Qty: 30 TABLET | Refills: 0 | Status: ON HOLD | OUTPATIENT
Start: 2021-10-29 | End: 2021-12-15

## 2021-10-29 RX ORDER — GENTAMICIN SULFATE 40 MG/ML
80 INJECTION, SOLUTION INTRAMUSCULAR; INTRAVENOUS ONCE
Status: DISCONTINUED | OUTPATIENT
Start: 2021-10-29 | End: 2021-12-15

## 2021-10-29 RX ORDER — GENTAMICIN SULFATE 40 MG/ML
80 INJECTION, SOLUTION INTRAMUSCULAR; INTRAVENOUS ONCE
Status: DISCONTINUED | OUTPATIENT
Start: 2021-10-29 | End: 2021-11-01

## 2021-11-05 LAB — BACTERIA SPEC AEROBE CULT: ABNORMAL

## 2021-11-08 DIAGNOSIS — N18.4 CKD (CHRONIC KIDNEY DISEASE) STAGE 4, GFR 15-29 ML/MIN (HCC): ICD-10-CM

## 2021-11-08 DIAGNOSIS — N39.0 URINARY TRACT INFECTION WITHOUT HEMATURIA, SITE UNSPECIFIED: Primary | ICD-10-CM

## 2021-11-08 RX ORDER — CEPHALEXIN 250 MG/1
250 CAPSULE ORAL 2 TIMES DAILY
Qty: 14 CAPSULE | Refills: 0 | Status: SHIPPED | OUTPATIENT
Start: 2021-11-08 | End: 2021-11-15

## 2021-11-10 NOTE — PROGRESS NOTES
Please contact patient/ patient daughter to be sure patient did receive message and began her keflex prescribed on 11/08/21.  Thank you

## 2021-11-18 ENCOUNTER — TRANSCRIBE ORDERS (OUTPATIENT)
Dept: LAB | Facility: HOSPITAL | Age: 86
End: 2021-11-18

## 2021-11-18 ENCOUNTER — LAB (OUTPATIENT)
Dept: LAB | Facility: HOSPITAL | Age: 86
End: 2021-11-18

## 2021-11-18 DIAGNOSIS — N18.30 STAGE 3 CHRONIC KIDNEY DISEASE, UNSPECIFIED WHETHER STAGE 3A OR 3B CKD (HCC): ICD-10-CM

## 2021-11-18 DIAGNOSIS — N18.30 STAGE 3 CHRONIC KIDNEY DISEASE, UNSPECIFIED WHETHER STAGE 3A OR 3B CKD (HCC): Primary | ICD-10-CM

## 2021-11-18 LAB
25(OH)D3 SERPL-MCNC: 25.3 NG/ML
ALBUMIN SERPL-MCNC: 4.4 G/DL (ref 3.5–5.2)
ANION GAP SERPL CALCULATED.3IONS-SCNC: 13 MMOL/L (ref 5–15)
BACTERIA UR QL AUTO: NORMAL /HPF
BASOPHILS # BLD AUTO: 0.05 10*3/MM3 (ref 0–0.2)
BASOPHILS NFR BLD AUTO: 0.8 % (ref 0–1.5)
BILIRUB UR QL STRIP: NEGATIVE
BUN SERPL-MCNC: 57 MG/DL (ref 8–23)
BUN/CREAT SERPL: 33.1 (ref 7–25)
CALCIUM SPEC-SCNC: 10.2 MG/DL (ref 8.2–9.6)
CALCIUM SPEC-SCNC: 9.8 MG/DL (ref 8.2–9.6)
CHLORIDE SERPL-SCNC: 103 MMOL/L (ref 98–107)
CLARITY UR: CLEAR
CO2 SERPL-SCNC: 24 MMOL/L (ref 22–29)
COLOR UR: YELLOW
CREAT SERPL-MCNC: 1.72 MG/DL (ref 0.57–1)
CREAT UR-MCNC: 44.3 MG/DL
DEPRECATED RDW RBC AUTO: 46.8 FL (ref 37–54)
EOSINOPHIL # BLD AUTO: 0.15 10*3/MM3 (ref 0–0.4)
EOSINOPHIL NFR BLD AUTO: 2.3 % (ref 0.3–6.2)
ERYTHROCYTE [DISTWIDTH] IN BLOOD BY AUTOMATED COUNT: 15.5 % (ref 12.3–15.4)
GFR SERPL CREATININE-BSD FRML MDRD: 28 ML/MIN/1.73
GLUCOSE SERPL-MCNC: 209 MG/DL (ref 65–99)
GLUCOSE UR STRIP-MCNC: NEGATIVE MG/DL
HCT VFR BLD AUTO: 38.4 % (ref 34–46.6)
HGB BLD-MCNC: 11.4 G/DL (ref 12–15.9)
HGB UR QL STRIP.AUTO: NEGATIVE
HYALINE CASTS UR QL AUTO: NORMAL /LPF
IMM GRANULOCYTES # BLD AUTO: 0.02 10*3/MM3 (ref 0–0.05)
IMM GRANULOCYTES NFR BLD AUTO: 0.3 % (ref 0–0.5)
KETONES UR QL STRIP: NEGATIVE
LEUKOCYTE ESTERASE UR QL STRIP.AUTO: NEGATIVE
LYMPHOCYTES # BLD AUTO: 1.16 10*3/MM3 (ref 0.7–3.1)
LYMPHOCYTES NFR BLD AUTO: 17.8 % (ref 19.6–45.3)
MCH RBC QN AUTO: 24.6 PG (ref 26.6–33)
MCHC RBC AUTO-ENTMCNC: 29.7 G/DL (ref 31.5–35.7)
MCV RBC AUTO: 82.8 FL (ref 79–97)
MONOCYTES # BLD AUTO: 0.46 10*3/MM3 (ref 0.1–0.9)
MONOCYTES NFR BLD AUTO: 7 % (ref 5–12)
NEUTROPHILS NFR BLD AUTO: 4.69 10*3/MM3 (ref 1.7–7)
NEUTROPHILS NFR BLD AUTO: 71.8 % (ref 42.7–76)
NITRITE UR QL STRIP: NEGATIVE
NRBC BLD AUTO-RTO: 0 /100 WBC (ref 0–0.2)
PH UR STRIP.AUTO: 5.5 [PH] (ref 5–8)
PHOSPHATE SERPL-MCNC: 3.2 MG/DL (ref 2.5–4.5)
PLATELET # BLD AUTO: 235 10*3/MM3 (ref 140–450)
PMV BLD AUTO: 9.3 FL (ref 6–12)
POTASSIUM SERPL-SCNC: 4.7 MMOL/L (ref 3.5–5.2)
PROT ?TM UR-MCNC: 11.2 MG/DL
PROT UR QL STRIP: NEGATIVE
PROT/CREAT UR: 0.3 MG/G{CREAT}
PTH-INTACT SERPL-MCNC: 126 PG/ML (ref 15–65)
RBC # BLD AUTO: 4.64 10*6/MM3 (ref 3.77–5.28)
RBC # UR STRIP: NORMAL /HPF
REF LAB TEST METHOD: NORMAL
SODIUM SERPL-SCNC: 140 MMOL/L (ref 136–145)
SP GR UR STRIP: 1.01 (ref 1–1.03)
SQUAMOUS #/AREA URNS HPF: NORMAL /HPF
UROBILINOGEN UR QL STRIP: NORMAL
WBC # UR STRIP: NORMAL /HPF
WBC NRBC COR # BLD: 6.53 10*3/MM3 (ref 3.4–10.8)

## 2021-11-18 PROCEDURE — 81001 URINALYSIS AUTO W/SCOPE: CPT

## 2021-11-18 PROCEDURE — 85025 COMPLETE CBC W/AUTO DIFF WBC: CPT

## 2021-11-18 PROCEDURE — 82310 ASSAY OF CALCIUM: CPT

## 2021-11-18 PROCEDURE — 82306 VITAMIN D 25 HYDROXY: CPT

## 2021-11-18 PROCEDURE — 84156 ASSAY OF PROTEIN URINE: CPT

## 2021-11-18 PROCEDURE — 83970 ASSAY OF PARATHORMONE: CPT

## 2021-11-18 PROCEDURE — 80069 RENAL FUNCTION PANEL: CPT

## 2021-11-18 PROCEDURE — 36415 COLL VENOUS BLD VENIPUNCTURE: CPT

## 2021-11-18 PROCEDURE — 82570 ASSAY OF URINE CREATININE: CPT

## 2021-11-19 DIAGNOSIS — N76.1 CHRONIC VAGINITIS: Primary | ICD-10-CM

## 2021-11-19 RX ORDER — NYSTATIN 100000 U/G
1 CREAM TOPICAL 2 TIMES DAILY
Qty: 60 G | Refills: 0 | Status: ON HOLD | OUTPATIENT
Start: 2021-11-19 | End: 2021-12-26

## 2021-12-15 ENCOUNTER — HOSPITAL ENCOUNTER (INPATIENT)
Facility: HOSPITAL | Age: 86
LOS: 11 days | Discharge: HOME HEALTH | End: 2021-12-26
Attending: INTERNAL MEDICINE | Admitting: INTERNAL MEDICINE

## 2021-12-15 ENCOUNTER — APPOINTMENT (OUTPATIENT)
Dept: CARDIOLOGY | Facility: HOSPITAL | Age: 86
End: 2021-12-15

## 2021-12-15 ENCOUNTER — APPOINTMENT (OUTPATIENT)
Dept: GENERAL RADIOLOGY | Facility: HOSPITAL | Age: 86
End: 2021-12-15

## 2021-12-15 ENCOUNTER — PREP FOR SURGERY (OUTPATIENT)
Dept: OTHER | Facility: HOSPITAL | Age: 86
End: 2021-12-15

## 2021-12-15 DIAGNOSIS — R26.2 DIFFICULTY WALKING: Primary | ICD-10-CM

## 2021-12-15 DIAGNOSIS — Z78.9 DECREASED ACTIVITIES OF DAILY LIVING (ADL): ICD-10-CM

## 2021-12-15 DIAGNOSIS — N76.1 CHRONIC VAGINITIS: ICD-10-CM

## 2021-12-15 DIAGNOSIS — R91.8 HILAR MASS: ICD-10-CM

## 2021-12-15 PROBLEM — L03.119 CELLULITIS AND ABSCESS OF FOOT: Status: ACTIVE | Noted: 2021-12-15

## 2021-12-15 PROBLEM — L02.619 CELLULITIS AND ABSCESS OF FOOT: Status: ACTIVE | Noted: 2021-12-15

## 2021-12-15 LAB
ALBUMIN SERPL-MCNC: 3.9 G/DL (ref 3.5–5.2)
ALBUMIN/GLOB SERPL: 1.4 G/DL
ALP SERPL-CCNC: 74 U/L (ref 39–117)
ALT SERPL W P-5'-P-CCNC: 7 U/L (ref 1–33)
ANION GAP SERPL CALCULATED.3IONS-SCNC: 11.3 MMOL/L (ref 5–15)
AST SERPL-CCNC: 16 U/L (ref 1–32)
BILIRUB SERPL-MCNC: <0.2 MG/DL (ref 0–1.2)
BUN SERPL-MCNC: 47 MG/DL (ref 8–23)
BUN/CREAT SERPL: 26.1 (ref 7–25)
CALCIUM SPEC-SCNC: 10 MG/DL (ref 8.2–9.6)
CHLORIDE SERPL-SCNC: 104 MMOL/L (ref 98–107)
CO2 SERPL-SCNC: 21.7 MMOL/L (ref 22–29)
CREAT SERPL-MCNC: 1.8 MG/DL (ref 0.57–1)
DEPRECATED RDW RBC AUTO: 45.5 FL (ref 37–54)
ERYTHROCYTE [DISTWIDTH] IN BLOOD BY AUTOMATED COUNT: 14.9 % (ref 12.3–15.4)
GFR SERPL CREATININE-BSD FRML MDRD: 26 ML/MIN/1.73
GLOBULIN UR ELPH-MCNC: 2.8 GM/DL
GLUCOSE BLDC GLUCOMTR-MCNC: 126 MG/DL (ref 70–99)
GLUCOSE BLDC GLUCOMTR-MCNC: 276 MG/DL (ref 70–99)
GLUCOSE SERPL-MCNC: 278 MG/DL (ref 65–99)
HCT VFR BLD AUTO: 34.3 % (ref 34–46.6)
HGB BLD-MCNC: 10.4 G/DL (ref 12–15.9)
MCH RBC QN AUTO: 25.4 PG (ref 26.6–33)
MCHC RBC AUTO-ENTMCNC: 30.3 G/DL (ref 31.5–35.7)
MCV RBC AUTO: 83.7 FL (ref 79–97)
PLATELET # BLD AUTO: 211 10*3/MM3 (ref 140–450)
PMV BLD AUTO: 8.5 FL (ref 6–12)
POTASSIUM SERPL-SCNC: 4.4 MMOL/L (ref 3.5–5.2)
PROT SERPL-MCNC: 6.7 G/DL (ref 6–8.5)
RBC # BLD AUTO: 4.1 10*6/MM3 (ref 3.77–5.28)
SODIUM SERPL-SCNC: 137 MMOL/L (ref 136–145)
WBC NRBC COR # BLD: 5.3 10*3/MM3 (ref 3.4–10.8)

## 2021-12-15 PROCEDURE — 25010000002 CEFTRIAXONE PER 250 MG: Performed by: INTERNAL MEDICINE

## 2021-12-15 PROCEDURE — 80053 COMPREHEN METABOLIC PANEL: CPT | Performed by: INTERNAL MEDICINE

## 2021-12-15 PROCEDURE — 63710000001 INSULIN DETEMIR PER 5 UNITS: Performed by: INTERNAL MEDICINE

## 2021-12-15 PROCEDURE — 71045 X-RAY EXAM CHEST 1 VIEW: CPT

## 2021-12-15 PROCEDURE — 25010000002 FUROSEMIDE PER 20 MG: Performed by: INTERNAL MEDICINE

## 2021-12-15 PROCEDURE — 93970 EXTREMITY STUDY: CPT

## 2021-12-15 PROCEDURE — 87040 BLOOD CULTURE FOR BACTERIA: CPT | Performed by: INTERNAL MEDICINE

## 2021-12-15 PROCEDURE — 82962 GLUCOSE BLOOD TEST: CPT

## 2021-12-15 PROCEDURE — 85027 COMPLETE CBC AUTOMATED: CPT | Performed by: INTERNAL MEDICINE

## 2021-12-15 PROCEDURE — 63710000001 INSULIN LISPRO (HUMAN) PER 5 UNITS: Performed by: INTERNAL MEDICINE

## 2021-12-15 PROCEDURE — 93970 EXTREMITY STUDY: CPT | Performed by: SURGERY

## 2021-12-15 PROCEDURE — 93306 TTE W/DOPPLER COMPLETE: CPT | Performed by: INTERNAL MEDICINE

## 2021-12-15 PROCEDURE — 93306 TTE W/DOPPLER COMPLETE: CPT

## 2021-12-15 RX ORDER — FUROSEMIDE 40 MG/1
40 TABLET ORAL DAILY PRN
Status: DISCONTINUED | OUTPATIENT
Start: 2021-12-15 | End: 2021-12-26 | Stop reason: HOSPADM

## 2021-12-15 RX ORDER — FUROSEMIDE 10 MG/ML
40 INJECTION INTRAMUSCULAR; INTRAVENOUS
Status: DISCONTINUED | OUTPATIENT
Start: 2021-12-15 | End: 2021-12-17

## 2021-12-15 RX ORDER — NYSTATIN 100000 U/G
CREAM TOPICAL 2 TIMES DAILY PRN
Status: DISCONTINUED | OUTPATIENT
Start: 2021-12-15 | End: 2021-12-26 | Stop reason: HOSPADM

## 2021-12-15 RX ORDER — SODIUM CHLORIDE 0.9 % (FLUSH) 0.9 %
10 SYRINGE (ML) INJECTION EVERY 12 HOURS SCHEDULED
Status: DISCONTINUED | OUTPATIENT
Start: 2021-12-15 | End: 2021-12-26 | Stop reason: HOSPADM

## 2021-12-15 RX ORDER — METOPROLOL TARTRATE 50 MG/1
50 TABLET, FILM COATED ORAL EVERY 12 HOURS SCHEDULED
Status: DISCONTINUED | OUTPATIENT
Start: 2021-12-16 | End: 2021-12-24

## 2021-12-15 RX ORDER — ALUMINA, MAGNESIA, AND SIMETHICONE 2400; 2400; 240 MG/30ML; MG/30ML; MG/30ML
15 SUSPENSION ORAL EVERY 6 HOURS PRN
Status: DISCONTINUED | OUTPATIENT
Start: 2021-12-15 | End: 2021-12-26 | Stop reason: HOSPADM

## 2021-12-15 RX ORDER — CEFTRIAXONE SODIUM 1 G/50ML
1 INJECTION, SOLUTION INTRAVENOUS EVERY 24 HOURS
Status: COMPLETED | OUTPATIENT
Start: 2021-12-15 | End: 2021-12-21

## 2021-12-15 RX ORDER — ACETAMINOPHEN 325 MG/1
650 TABLET ORAL EVERY 4 HOURS PRN
Status: DISCONTINUED | OUTPATIENT
Start: 2021-12-15 | End: 2021-12-16

## 2021-12-15 RX ORDER — MULTIPLE VITAMINS W/ MINERALS TAB 9MG-400MCG
1 TAB ORAL DAILY
Status: DISCONTINUED | OUTPATIENT
Start: 2021-12-15 | End: 2021-12-26 | Stop reason: HOSPADM

## 2021-12-15 RX ORDER — CEFTRIAXONE 1 G/1
1 INJECTION, POWDER, FOR SOLUTION INTRAMUSCULAR; INTRAVENOUS EVERY 24 HOURS
Status: DISCONTINUED | OUTPATIENT
Start: 2021-12-15 | End: 2021-12-15

## 2021-12-15 RX ORDER — BISOPROLOL FUMARATE AND HYDROCHLOROTHIAZIDE 10; 6.25 MG/1; MG/1
1 TABLET ORAL
Status: DISCONTINUED | OUTPATIENT
Start: 2021-12-15 | End: 2021-12-15

## 2021-12-15 RX ORDER — ROPINIROLE 0.25 MG/1
0.25 TABLET, FILM COATED ORAL NIGHTLY
COMMUNITY

## 2021-12-15 RX ORDER — NYSTATIN 100000 U/G
1 CREAM TOPICAL 2 TIMES DAILY
Status: COMPLETED | OUTPATIENT
Start: 2021-12-15 | End: 2021-12-19

## 2021-12-15 RX ORDER — ONDANSETRON 2 MG/ML
4 INJECTION INTRAMUSCULAR; INTRAVENOUS EVERY 6 HOURS PRN
Status: DISCONTINUED | OUTPATIENT
Start: 2021-12-15 | End: 2021-12-16 | Stop reason: SDUPTHER

## 2021-12-15 RX ORDER — METOPROLOL TARTRATE 50 MG/1
50 TABLET, FILM COATED ORAL DAILY
Status: ON HOLD | COMMUNITY
End: 2021-12-15

## 2021-12-15 RX ORDER — PANTOPRAZOLE SODIUM 40 MG/1
40 TABLET, DELAYED RELEASE ORAL EVERY MORNING
Status: DISCONTINUED | OUTPATIENT
Start: 2021-12-16 | End: 2021-12-26 | Stop reason: HOSPADM

## 2021-12-15 RX ORDER — METOPROLOL TARTRATE AND HYDROCHLOROTHIAZIDE 50; 25 MG/1; MG/1
1 TABLET ORAL 2 TIMES DAILY
Status: DISCONTINUED | OUTPATIENT
Start: 2021-12-16 | End: 2021-12-15

## 2021-12-15 RX ORDER — HYDROCHLOROTHIAZIDE 25 MG/1
25 TABLET ORAL
Status: DISCONTINUED | OUTPATIENT
Start: 2021-12-16 | End: 2021-12-17

## 2021-12-15 RX ORDER — ONDANSETRON 2 MG/ML
4 INJECTION INTRAMUSCULAR; INTRAVENOUS EVERY 6 HOURS PRN
Status: CANCELLED | OUTPATIENT
Start: 2021-12-15

## 2021-12-15 RX ORDER — THEOPHYLLINE 300 MG/1
300 TABLET, EXTENDED RELEASE ORAL EVERY 12 HOURS SCHEDULED
Status: DISCONTINUED | OUTPATIENT
Start: 2021-12-15 | End: 2021-12-26 | Stop reason: HOSPADM

## 2021-12-15 RX ORDER — ROPINIROLE 0.25 MG/1
0.25 TABLET, FILM COATED ORAL NIGHTLY
Status: DISCONTINUED | OUTPATIENT
Start: 2021-12-15 | End: 2021-12-26 | Stop reason: HOSPADM

## 2021-12-15 RX ORDER — SODIUM CHLORIDE 0.9 % (FLUSH) 0.9 %
10 SYRINGE (ML) INJECTION AS NEEDED
Status: DISCONTINUED | OUTPATIENT
Start: 2021-12-15 | End: 2021-12-25

## 2021-12-15 RX ORDER — SODIUM CHLORIDE 0.9 % (FLUSH) 0.9 %
10 SYRINGE (ML) INJECTION EVERY 12 HOURS SCHEDULED
Status: CANCELLED | OUTPATIENT
Start: 2021-12-15

## 2021-12-15 RX ORDER — ACETAMINOPHEN 325 MG/1
650 TABLET ORAL EVERY 4 HOURS PRN
Status: CANCELLED | OUTPATIENT
Start: 2021-12-15

## 2021-12-15 RX ORDER — GABAPENTIN 300 MG/1
300 CAPSULE ORAL EVERY 8 HOURS SCHEDULED
Status: DISCONTINUED | OUTPATIENT
Start: 2021-12-15 | End: 2021-12-26 | Stop reason: HOSPADM

## 2021-12-15 RX ORDER — ESTRADIOL 0.1 MG/G
1 CREAM VAGINAL NIGHTLY
Status: DISCONTINUED | OUTPATIENT
Start: 2021-12-15 | End: 2021-12-26 | Stop reason: HOSPADM

## 2021-12-15 RX ORDER — FOLIC ACID 1 MG/1
1 TABLET ORAL DAILY
Status: DISCONTINUED | OUTPATIENT
Start: 2021-12-15 | End: 2021-12-26 | Stop reason: HOSPADM

## 2021-12-15 RX ORDER — METOPROLOL TARTRATE AND HYDROCHLOROTHIAZIDE 50; 25 MG/1; MG/1
1 TABLET ORAL 2 TIMES DAILY
Status: ON HOLD | COMMUNITY
End: 2023-01-01

## 2021-12-15 RX ORDER — LORAZEPAM 0.5 MG/1
0.5 TABLET ORAL 2 TIMES DAILY
Status: DISCONTINUED | OUTPATIENT
Start: 2021-12-15 | End: 2021-12-26 | Stop reason: HOSPADM

## 2021-12-15 RX ORDER — SODIUM CHLORIDE 0.9 % (FLUSH) 0.9 %
10 SYRINGE (ML) INJECTION AS NEEDED
Status: CANCELLED | OUTPATIENT
Start: 2021-12-15

## 2021-12-15 RX ORDER — LISINOPRIL 2.5 MG/1
2.5 TABLET ORAL DAILY
Status: DISCONTINUED | OUTPATIENT
Start: 2021-12-15 | End: 2021-12-24

## 2021-12-15 RX ADMIN — ESTRADIOL 1 G: 0.1 CREAM VAGINAL at 21:25

## 2021-12-15 RX ADMIN — LORAZEPAM 0.5 MG: 0.5 TABLET ORAL at 21:24

## 2021-12-15 RX ADMIN — CEFTRIAXONE SODIUM 1 G: 1 INJECTION, SOLUTION INTRAVENOUS at 18:42

## 2021-12-15 RX ADMIN — FOLIC ACID 1 MG: 1 TABLET ORAL at 18:43

## 2021-12-15 RX ADMIN — FUROSEMIDE 40 MG: 10 INJECTION, SOLUTION INTRAVENOUS at 18:43

## 2021-12-15 RX ADMIN — SODIUM CHLORIDE, PRESERVATIVE FREE 10 ML: 5 INJECTION INTRAVENOUS at 21:25

## 2021-12-15 RX ADMIN — ROPINIROLE HYDROCHLORIDE 0.25 MG: 0.25 TABLET, FILM COATED ORAL at 21:24

## 2021-12-15 RX ADMIN — LISINOPRIL 2.5 MG: 2.5 TABLET ORAL at 18:44

## 2021-12-15 RX ADMIN — GABAPENTIN 300 MG: 300 CAPSULE ORAL at 21:24

## 2021-12-15 RX ADMIN — THEOPHYLLINE 300 MG: 300 TABLET, EXTENDED RELEASE ORAL at 21:24

## 2021-12-15 RX ADMIN — INSULIN DETEMIR 40 UNITS: 100 INJECTION, SOLUTION SUBCUTANEOUS at 21:26

## 2021-12-15 RX ADMIN — NYSTATIN 1 APPLICATION: 100000 CREAM TOPICAL at 21:25

## 2021-12-15 RX ADMIN — Medication 1 TABLET: at 18:43

## 2021-12-15 RX ADMIN — MULTIPLE VITAMINS W/ MINERALS TAB 1 TABLET: TAB at 18:43

## 2021-12-15 RX ADMIN — INSULIN LISPRO 10 UNITS: 100 INJECTION, SOLUTION INTRAVENOUS; SUBCUTANEOUS at 18:43

## 2021-12-16 ENCOUNTER — APPOINTMENT (OUTPATIENT)
Dept: GENERAL RADIOLOGY | Facility: HOSPITAL | Age: 86
End: 2021-12-16

## 2021-12-16 PROBLEM — L03.90 CELLULITIS: Status: ACTIVE | Noted: 2021-12-16

## 2021-12-16 LAB
ALBUMIN SERPL-MCNC: 3.9 G/DL (ref 3.5–5.2)
ALBUMIN SERPL-MCNC: 4.2 G/DL (ref 3.5–5.2)
ALBUMIN/GLOB SERPL: 1.5 G/DL
ALBUMIN/GLOB SERPL: 1.6 G/DL
ALP SERPL-CCNC: 72 U/L (ref 39–117)
ALP SERPL-CCNC: 78 U/L (ref 39–117)
ALT SERPL W P-5'-P-CCNC: 7 U/L (ref 1–33)
ALT SERPL W P-5'-P-CCNC: 8 U/L (ref 1–33)
ANION GAP SERPL CALCULATED.3IONS-SCNC: 11.5 MMOL/L (ref 5–15)
ANION GAP SERPL CALCULATED.3IONS-SCNC: 9.3 MMOL/L (ref 5–15)
AORTIC DIMENSIONLESS INDEX: 0.8 (DI)
AST SERPL-CCNC: 15 U/L (ref 1–32)
AST SERPL-CCNC: 17 U/L (ref 1–32)
BACTERIA UR QL AUTO: ABNORMAL /HPF
BASOPHILS # BLD AUTO: 0.05 10*3/MM3 (ref 0–0.2)
BASOPHILS NFR BLD AUTO: 0.9 % (ref 0–1.5)
BH CV ECHO MEAS - AO MAX PG: 11 MMHG
BH CV ECHO MEAS - AO MEAN PG: 8 MMHG
BH CV ECHO MEAS - AO ROOT DIAM: 2.5 CM
BH CV ECHO MEAS - AO V2 MAX: 169 CM/SEC
BH CV ECHO MEAS - AO V2 VTI: 30.7 CM
BH CV ECHO MEAS - AVA PLANIMETRY TRACED: 2.4 CM2
BH CV ECHO MEAS - EDV(MOD-SP2): 49.7 ML
BH CV ECHO MEAS - EDV(MOD-SP4): 46.9 ML
BH CV ECHO MEAS - EF(MOD-BP): 65 %
BH CV ECHO MEAS - ESV(MOD-SP2): 16.2 ML
BH CV ECHO MEAS - ESV(MOD-SP4): 16.3 ML
BH CV ECHO MEAS - IVSD: 1.6 CM
BH CV ECHO MEAS - LA A2CS (ATRIAL LENGTH): 5.6 CM
BH CV ECHO MEAS - LA DIMENSION(2D): 2.4 CM
BH CV ECHO MEAS - LAT PEAK E' VEL: 4.8 CM/SEC
BH CV ECHO MEAS - LV MAX PG: 8 MMHG
BH CV ECHO MEAS - LV MEAN PG: 4 MMHG
BH CV ECHO MEAS - LV V1 MAX: 145 CM/SEC
BH CV ECHO MEAS - LV V1 VTI: 23.4 CM
BH CV ECHO MEAS - LVIDD: 3.2 CM
BH CV ECHO MEAS - LVIDS: 2.2 CM
BH CV ECHO MEAS - LVOT DIAM: 2.2 CM
BH CV ECHO MEAS - LVPWD: 1.5 CM
BH CV ECHO MEAS - MED PEAK E' VEL: 5 CM/SEC
BH CV ECHO MEAS - MV A MAX VEL: 152 CM/SEC
BH CV ECHO MEAS - MV DEC TIME: 196 MSEC
BH CV ECHO MEAS - MV E MAX VEL: 77.9 CM/SEC
BH CV ECHO MEAS - MV E/A: 0.5
BH CV ECHO MEAS - MV MAX PG: 8 MMHG
BH CV ECHO MEAS - MV MEAN PG: 3 MMHG
BH CV ECHO MEAS - MV P1/2T: 4 MSEC
BH CV ECHO MEAS - MV V2 VTI: 26 CM
BH CV ECHO MEAS - PA V2 MAX: 81 CM/SEC
BH CV ECHO MEAS - RAP SYSTOLE: 8 MMHG
BH CV ECHO MEAS - RVDD: 3.1 CM
BH CV ECHO MEASUREMENTS AVERAGE E/E' RATIO: 15.9
BH CV LOWER VASCULAR LEFT COMMON FEMORAL AUGMENT: NORMAL
BH CV LOWER VASCULAR LEFT COMMON FEMORAL COMPRESS: NORMAL
BH CV LOWER VASCULAR LEFT COMMON FEMORAL PHASIC: NORMAL
BH CV LOWER VASCULAR LEFT COMMON FEMORAL SPONT: NORMAL
BH CV LOWER VASCULAR LEFT DISTAL FEMORAL AUGMENT: NORMAL
BH CV LOWER VASCULAR LEFT DISTAL FEMORAL COMPETENT: NORMAL
BH CV LOWER VASCULAR LEFT DISTAL FEMORAL COMPRESS: NORMAL
BH CV LOWER VASCULAR LEFT DISTAL FEMORAL PHASIC: NORMAL
BH CV LOWER VASCULAR LEFT DISTAL FEMORAL SPONT: NORMAL
BH CV LOWER VASCULAR LEFT GREATER SAPH AK COMPRESS: NORMAL
BH CV LOWER VASCULAR LEFT MID FEMORAL COMPRESS: NORMAL
BH CV LOWER VASCULAR LEFT PERONEAL COMPRESS: NORMAL
BH CV LOWER VASCULAR LEFT POPLITEAL AUGMENT: NORMAL
BH CV LOWER VASCULAR LEFT POPLITEAL COMPETENT: NORMAL
BH CV LOWER VASCULAR LEFT POPLITEAL COMPRESS: NORMAL
BH CV LOWER VASCULAR LEFT POPLITEAL PHASIC: NORMAL
BH CV LOWER VASCULAR LEFT POPLITEAL SPONT: NORMAL
BH CV LOWER VASCULAR LEFT POSTERIOR TIBIAL AUGMENT: NORMAL
BH CV LOWER VASCULAR LEFT POSTERIOR TIBIAL COMPRESS: NORMAL
BH CV LOWER VASCULAR LEFT PROXIMAL FEMORAL COMPRESS: NORMAL
BH CV LOWER VASCULAR RIGHT COMMON FEMORAL AUGMENT: NORMAL
BH CV LOWER VASCULAR RIGHT COMMON FEMORAL COMPRESS: NORMAL
BH CV LOWER VASCULAR RIGHT COMMON FEMORAL PHASIC: NORMAL
BH CV LOWER VASCULAR RIGHT COMMON FEMORAL SPONT: NORMAL
BH CV LOWER VASCULAR RIGHT DISTAL FEMORAL AUGMENT: NORMAL
BH CV LOWER VASCULAR RIGHT DISTAL FEMORAL COMPETENT: NORMAL
BH CV LOWER VASCULAR RIGHT DISTAL FEMORAL COMPRESS: NORMAL
BH CV LOWER VASCULAR RIGHT DISTAL FEMORAL PHASIC: NORMAL
BH CV LOWER VASCULAR RIGHT DISTAL FEMORAL SPONT: NORMAL
BH CV LOWER VASCULAR RIGHT GREATER SAPH AK COMPRESS: NORMAL
BH CV LOWER VASCULAR RIGHT MID FEMORAL COMPRESS: NORMAL
BH CV LOWER VASCULAR RIGHT PERONEAL COMPRESS: NORMAL
BH CV LOWER VASCULAR RIGHT POPLITEAL AUGMENT: NORMAL
BH CV LOWER VASCULAR RIGHT POPLITEAL COMPETENT: NORMAL
BH CV LOWER VASCULAR RIGHT POPLITEAL COMPRESS: NORMAL
BH CV LOWER VASCULAR RIGHT POPLITEAL PHASIC: NORMAL
BH CV LOWER VASCULAR RIGHT POPLITEAL SPONT: NORMAL
BH CV LOWER VASCULAR RIGHT POSTERIOR TIBIAL AUGMENT: NORMAL
BH CV LOWER VASCULAR RIGHT POSTERIOR TIBIAL COMPRESS: NORMAL
BH CV LOWER VASCULAR RIGHT PROXIMAL FEMORAL COMPRESS: NORMAL
BILIRUB SERPL-MCNC: 0.2 MG/DL (ref 0–1.2)
BILIRUB SERPL-MCNC: <0.2 MG/DL (ref 0–1.2)
BILIRUB UR QL STRIP: NEGATIVE
BUN SERPL-MCNC: 44 MG/DL (ref 8–23)
BUN SERPL-MCNC: 45 MG/DL (ref 8–23)
BUN/CREAT SERPL: 26.7 (ref 7–25)
BUN/CREAT SERPL: 28.3 (ref 7–25)
CALCIUM SPEC-SCNC: 10.2 MG/DL (ref 8.2–9.6)
CALCIUM SPEC-SCNC: 11 MG/DL (ref 8.2–9.6)
CHLORIDE SERPL-SCNC: 101 MMOL/L (ref 98–107)
CHLORIDE SERPL-SCNC: 104 MMOL/L (ref 98–107)
CLARITY UR: ABNORMAL
CO2 SERPL-SCNC: 25.5 MMOL/L (ref 22–29)
CO2 SERPL-SCNC: 25.7 MMOL/L (ref 22–29)
COLOR UR: YELLOW
CREAT SERPL-MCNC: 1.59 MG/DL (ref 0.57–1)
CREAT SERPL-MCNC: 1.65 MG/DL (ref 0.57–1)
DEPRECATED RDW RBC AUTO: 44.9 FL (ref 37–54)
DEPRECATED RDW RBC AUTO: 45.2 FL (ref 37–54)
EOSINOPHIL # BLD AUTO: 0.23 10*3/MM3 (ref 0–0.4)
EOSINOPHIL NFR BLD AUTO: 4 % (ref 0.3–6.2)
ERYTHROCYTE [DISTWIDTH] IN BLOOD BY AUTOMATED COUNT: 14.8 % (ref 12.3–15.4)
ERYTHROCYTE [DISTWIDTH] IN BLOOD BY AUTOMATED COUNT: 14.9 % (ref 12.3–15.4)
FERRITIN SERPL-MCNC: 18.55 NG/ML (ref 13–150)
FOLATE SERPL-MCNC: >20 NG/ML (ref 4.78–24.2)
GFR SERPL CREATININE-BSD FRML MDRD: 29 ML/MIN/1.73
GFR SERPL CREATININE-BSD FRML MDRD: 30 ML/MIN/1.73
GLOBULIN UR ELPH-MCNC: 2.6 GM/DL
GLOBULIN UR ELPH-MCNC: 2.7 GM/DL
GLUCOSE BLDC GLUCOMTR-MCNC: 107 MG/DL (ref 70–99)
GLUCOSE BLDC GLUCOMTR-MCNC: 135 MG/DL (ref 70–99)
GLUCOSE BLDC GLUCOMTR-MCNC: 168 MG/DL (ref 70–99)
GLUCOSE BLDC GLUCOMTR-MCNC: 59 MG/DL (ref 70–99)
GLUCOSE BLDC GLUCOMTR-MCNC: 68 MG/DL (ref 70–99)
GLUCOSE SERPL-MCNC: 101 MG/DL (ref 65–99)
GLUCOSE SERPL-MCNC: 121 MG/DL (ref 65–99)
GLUCOSE UR STRIP-MCNC: NEGATIVE MG/DL
HAPTOGLOB SERPL-MCNC: 246 MG/DL (ref 30–200)
HCT VFR BLD AUTO: 34.1 % (ref 34–46.6)
HCT VFR BLD AUTO: 36.7 % (ref 34–46.6)
HCYS SERPL-MCNC: 15.8 UMOL/L (ref 0–15)
HGB BLD-MCNC: 10.3 G/DL (ref 12–15.9)
HGB BLD-MCNC: 11.1 G/DL (ref 12–15.9)
HGB UR QL STRIP.AUTO: NEGATIVE
IMM GRANULOCYTES # BLD AUTO: 0.02 10*3/MM3 (ref 0–0.05)
IMM GRANULOCYTES NFR BLD AUTO: 0.4 % (ref 0–0.5)
INR PPP: 0.92 (ref 2–3)
INR PPP: 0.94 (ref 2–3)
IRON 24H UR-MRATE: 35 MCG/DL (ref 37–145)
IRON SATN MFR SERPL: 9 % (ref 20–50)
IVRT: 90 MSEC
KETONES UR QL STRIP: NEGATIVE
LEFT ATRIUM VOLUME INDEX: 17.6 ML/M2
LEFT ATRIUM VOLUME: 5.7 CM3
LEUKOCYTE ESTERASE UR QL STRIP.AUTO: ABNORMAL
LYMPHOCYTES # BLD AUTO: 1.73 10*3/MM3 (ref 0.7–3.1)
LYMPHOCYTES NFR BLD AUTO: 30.4 % (ref 19.6–45.3)
MAXIMAL PREDICTED HEART RATE: 129 BPM
MAXIMAL PREDICTED HEART RATE: 129 BPM
MCH RBC QN AUTO: 25 PG (ref 26.6–33)
MCH RBC QN AUTO: 25.1 PG (ref 26.6–33)
MCHC RBC AUTO-ENTMCNC: 30.2 G/DL (ref 31.5–35.7)
MCHC RBC AUTO-ENTMCNC: 30.2 G/DL (ref 31.5–35.7)
MCV RBC AUTO: 82.8 FL (ref 79–97)
MCV RBC AUTO: 83 FL (ref 79–97)
MONOCYTES # BLD AUTO: 0.44 10*3/MM3 (ref 0.1–0.9)
MONOCYTES NFR BLD AUTO: 7.7 % (ref 5–12)
NEUTROPHILS NFR BLD AUTO: 3.23 10*3/MM3 (ref 1.7–7)
NEUTROPHILS NFR BLD AUTO: 56.6 % (ref 42.7–76)
NITRITE UR QL STRIP: NEGATIVE
NRBC BLD AUTO-RTO: 0 /100 WBC (ref 0–0.2)
PH UR STRIP.AUTO: <=5 [PH] (ref 5–8)
PLATELET # BLD AUTO: 208 10*3/MM3 (ref 140–450)
PLATELET # BLD AUTO: 227 10*3/MM3 (ref 140–450)
PMV BLD AUTO: 8.5 FL (ref 6–12)
PMV BLD AUTO: 8.7 FL (ref 6–12)
POTASSIUM SERPL-SCNC: 4.1 MMOL/L (ref 3.5–5.2)
POTASSIUM SERPL-SCNC: 4.4 MMOL/L (ref 3.5–5.2)
PROT SERPL-MCNC: 6.5 G/DL (ref 6–8.5)
PROT SERPL-MCNC: 6.9 G/DL (ref 6–8.5)
PROT UR QL STRIP: NEGATIVE
PROTHROMBIN TIME: 10 SECONDS (ref 9.4–12)
PROTHROMBIN TIME: 9.8 SECONDS (ref 9.4–12)
RBC # BLD AUTO: 4.12 10*6/MM3 (ref 3.77–5.28)
RBC # BLD AUTO: 4.42 10*6/MM3 (ref 3.77–5.28)
RBC # UR STRIP: ABNORMAL /HPF
REF LAB TEST METHOD: ABNORMAL
RETICS # AUTO: 0.05 10*6/MM3 (ref 0.02–0.13)
RETICS/RBC NFR AUTO: 1.2 % (ref 0.7–1.9)
SODIUM SERPL-SCNC: 138 MMOL/L (ref 136–145)
SODIUM SERPL-SCNC: 139 MMOL/L (ref 136–145)
SP GR UR STRIP: 1.01 (ref 1–1.03)
SQUAMOUS #/AREA URNS HPF: ABNORMAL /HPF
STRESS TARGET HR: 110 BPM
STRESS TARGET HR: 110 BPM
TIBC SERPL-MCNC: 378 MCG/DL (ref 298–536)
TRANSFERRIN SERPL-MCNC: 254 MG/DL (ref 200–360)
UROBILINOGEN UR QL STRIP: ABNORMAL
VIT B12 BLD-MCNC: >2000 PG/ML (ref 211–946)
WBC # UR STRIP: ABNORMAL /HPF
WBC NRBC COR # BLD: 5.7 10*3/MM3 (ref 3.4–10.8)
WBC NRBC COR # BLD: 7.06 10*3/MM3 (ref 3.4–10.8)

## 2021-12-16 PROCEDURE — 85610 PROTHROMBIN TIME: CPT | Performed by: INTERNAL MEDICINE

## 2021-12-16 PROCEDURE — 25010000002 ENOXAPARIN PER 10 MG: Performed by: INTERNAL MEDICINE

## 2021-12-16 PROCEDURE — 85027 COMPLETE CBC AUTOMATED: CPT | Performed by: INTERNAL MEDICINE

## 2021-12-16 PROCEDURE — 82962 GLUCOSE BLOOD TEST: CPT

## 2021-12-16 PROCEDURE — 25010000002 IRON SUCROSE PER 1 MG: Performed by: INTERNAL MEDICINE

## 2021-12-16 PROCEDURE — 80053 COMPREHEN METABOLIC PANEL: CPT | Performed by: INTERNAL MEDICINE

## 2021-12-16 PROCEDURE — 94799 UNLISTED PULMONARY SVC/PX: CPT

## 2021-12-16 PROCEDURE — 63710000001 INSULIN LISPRO (HUMAN) PER 5 UNITS: Performed by: INTERNAL MEDICINE

## 2021-12-16 PROCEDURE — 83010 ASSAY OF HAPTOGLOBIN QUANT: CPT | Performed by: INTERNAL MEDICINE

## 2021-12-16 PROCEDURE — 83090 ASSAY OF HOMOCYSTEINE: CPT | Performed by: INTERNAL MEDICINE

## 2021-12-16 PROCEDURE — 82607 VITAMIN B-12: CPT | Performed by: INTERNAL MEDICINE

## 2021-12-16 PROCEDURE — 86738 MYCOPLASMA ANTIBODY: CPT | Performed by: INTERNAL MEDICINE

## 2021-12-16 PROCEDURE — 71046 X-RAY EXAM CHEST 2 VIEWS: CPT

## 2021-12-16 PROCEDURE — 25010000002 FUROSEMIDE PER 20 MG: Performed by: INTERNAL MEDICINE

## 2021-12-16 PROCEDURE — 85045 AUTOMATED RETICULOCYTE COUNT: CPT | Performed by: INTERNAL MEDICINE

## 2021-12-16 PROCEDURE — 63710000001 INSULIN DETEMIR PER 5 UNITS: Performed by: INTERNAL MEDICINE

## 2021-12-16 PROCEDURE — 87086 URINE CULTURE/COLONY COUNT: CPT | Performed by: INTERNAL MEDICINE

## 2021-12-16 PROCEDURE — 82728 ASSAY OF FERRITIN: CPT | Performed by: INTERNAL MEDICINE

## 2021-12-16 PROCEDURE — 25010000002 CEFTRIAXONE PER 250 MG: Performed by: INTERNAL MEDICINE

## 2021-12-16 PROCEDURE — 85025 COMPLETE CBC W/AUTO DIFF WBC: CPT | Performed by: INTERNAL MEDICINE

## 2021-12-16 PROCEDURE — 82746 ASSAY OF FOLIC ACID SERUM: CPT | Performed by: INTERNAL MEDICINE

## 2021-12-16 PROCEDURE — 83921 ORGANIC ACID SINGLE QUANT: CPT | Performed by: INTERNAL MEDICINE

## 2021-12-16 PROCEDURE — 84466 ASSAY OF TRANSFERRIN: CPT | Performed by: INTERNAL MEDICINE

## 2021-12-16 PROCEDURE — 81001 URINALYSIS AUTO W/SCOPE: CPT | Performed by: INTERNAL MEDICINE

## 2021-12-16 PROCEDURE — 83540 ASSAY OF IRON: CPT | Performed by: INTERNAL MEDICINE

## 2021-12-16 PROCEDURE — 97161 PT EVAL LOW COMPLEX 20 MIN: CPT

## 2021-12-16 RX ORDER — SODIUM CHLORIDE 0.9 % (FLUSH) 0.9 %
10 SYRINGE (ML) INJECTION AS NEEDED
Status: DISCONTINUED | OUTPATIENT
Start: 2021-12-16 | End: 2021-12-26 | Stop reason: HOSPADM

## 2021-12-16 RX ORDER — ONDANSETRON 2 MG/ML
4 INJECTION INTRAMUSCULAR; INTRAVENOUS EVERY 6 HOURS PRN
Status: DISCONTINUED | OUTPATIENT
Start: 2021-12-16 | End: 2021-12-26 | Stop reason: HOSPADM

## 2021-12-16 RX ORDER — TRAMADOL HYDROCHLORIDE 50 MG/1
50 TABLET ORAL EVERY 6 HOURS PRN
Status: DISPENSED | OUTPATIENT
Start: 2021-12-16 | End: 2021-12-23

## 2021-12-16 RX ORDER — SODIUM CHLORIDE 0.9 % (FLUSH) 0.9 %
10 SYRINGE (ML) INJECTION EVERY 12 HOURS SCHEDULED
Status: DISCONTINUED | OUTPATIENT
Start: 2021-12-16 | End: 2021-12-26 | Stop reason: HOSPADM

## 2021-12-16 RX ORDER — ACETAMINOPHEN 325 MG/1
650 TABLET ORAL EVERY 4 HOURS PRN
Status: DISCONTINUED | OUTPATIENT
Start: 2021-12-16 | End: 2021-12-26 | Stop reason: HOSPADM

## 2021-12-16 RX ADMIN — PANTOPRAZOLE SODIUM 40 MG: 40 TABLET, DELAYED RELEASE ORAL at 09:23

## 2021-12-16 RX ADMIN — TRAMADOL HYDROCHLORIDE 50 MG: 50 TABLET, COATED ORAL at 09:06

## 2021-12-16 RX ADMIN — CEFTRIAXONE SODIUM 1 G: 1 INJECTION, SOLUTION INTRAVENOUS at 18:05

## 2021-12-16 RX ADMIN — DICLOFENAC SODIUM 2 G: 10 GEL TOPICAL at 10:24

## 2021-12-16 RX ADMIN — MULTIPLE VITAMINS W/ MINERALS TAB 1 TABLET: TAB at 09:06

## 2021-12-16 RX ADMIN — INSULIN LISPRO 10 UNITS: 100 INJECTION, SOLUTION INTRAVENOUS; SUBCUTANEOUS at 09:10

## 2021-12-16 RX ADMIN — FOLIC ACID 1 MG: 1 TABLET ORAL at 09:06

## 2021-12-16 RX ADMIN — GABAPENTIN 300 MG: 300 CAPSULE ORAL at 21:05

## 2021-12-16 RX ADMIN — GABAPENTIN 300 MG: 300 CAPSULE ORAL at 08:19

## 2021-12-16 RX ADMIN — METOPROLOL TARTRATE 50 MG: 50 TABLET, FILM COATED ORAL at 09:06

## 2021-12-16 RX ADMIN — LORAZEPAM 0.5 MG: 0.5 TABLET ORAL at 21:05

## 2021-12-16 RX ADMIN — FUROSEMIDE 40 MG: 10 INJECTION, SOLUTION INTRAVENOUS at 09:05

## 2021-12-16 RX ADMIN — LISINOPRIL 2.5 MG: 2.5 TABLET ORAL at 09:07

## 2021-12-16 RX ADMIN — INSULIN LISPRO 10 UNITS: 100 INJECTION, SOLUTION INTRAVENOUS; SUBCUTANEOUS at 12:24

## 2021-12-16 RX ADMIN — ROPINIROLE HYDROCHLORIDE 0.25 MG: 0.25 TABLET, FILM COATED ORAL at 21:05

## 2021-12-16 RX ADMIN — THEOPHYLLINE 300 MG: 300 TABLET, EXTENDED RELEASE ORAL at 09:07

## 2021-12-16 RX ADMIN — ACETAMINOPHEN 650 MG: 325 TABLET ORAL at 07:53

## 2021-12-16 RX ADMIN — NYSTATIN 1 APPLICATION: 100000 CREAM TOPICAL at 21:05

## 2021-12-16 RX ADMIN — ESTRADIOL 1 G: 0.1 CREAM VAGINAL at 21:04

## 2021-12-16 RX ADMIN — HYDROCHLOROTHIAZIDE 25 MG: 25 TABLET ORAL at 18:03

## 2021-12-16 RX ADMIN — ACETAMINOPHEN 650 MG: 325 TABLET ORAL at 03:48

## 2021-12-16 RX ADMIN — SODIUM CHLORIDE, PRESERVATIVE FREE 10 ML: 5 INJECTION INTRAVENOUS at 09:10

## 2021-12-16 RX ADMIN — FUROSEMIDE 40 MG: 10 INJECTION, SOLUTION INTRAVENOUS at 18:03

## 2021-12-16 RX ADMIN — ENOXAPARIN SODIUM 30 MG: 100 INJECTION SUBCUTANEOUS at 09:08

## 2021-12-16 RX ADMIN — THEOPHYLLINE 300 MG: 300 TABLET, EXTENDED RELEASE ORAL at 21:05

## 2021-12-16 RX ADMIN — INSULIN DETEMIR 40 UNITS: 100 INJECTION, SOLUTION SUBCUTANEOUS at 09:10

## 2021-12-16 RX ADMIN — SODIUM CHLORIDE, PRESERVATIVE FREE 10 ML: 5 INJECTION INTRAVENOUS at 21:04

## 2021-12-16 RX ADMIN — SODIUM CHLORIDE, PRESERVATIVE FREE 10 ML: 5 INJECTION INTRAVENOUS at 09:09

## 2021-12-16 RX ADMIN — NYSTATIN 1 APPLICATION: 100000 CREAM TOPICAL at 09:10

## 2021-12-16 RX ADMIN — INSULIN DETEMIR 40 UNITS: 100 INJECTION, SOLUTION SUBCUTANEOUS at 21:04

## 2021-12-16 RX ADMIN — DICLOFENAC SODIUM 2 G: 10 GEL TOPICAL at 21:05

## 2021-12-16 RX ADMIN — HYDROCHLOROTHIAZIDE 25 MG: 25 TABLET ORAL at 09:06

## 2021-12-16 RX ADMIN — GABAPENTIN 300 MG: 300 CAPSULE ORAL at 13:49

## 2021-12-16 RX ADMIN — IRON SUCROSE 400 MG: 20 INJECTION, SOLUTION INTRAVENOUS at 10:24

## 2021-12-16 RX ADMIN — LORAZEPAM 0.5 MG: 0.5 TABLET ORAL at 09:06

## 2021-12-16 RX ADMIN — Medication 1 TABLET: at 09:07

## 2021-12-17 LAB
ANION GAP SERPL CALCULATED.3IONS-SCNC: 12.1 MMOL/L (ref 5–15)
BACTERIA SPEC AEROBE CULT: NORMAL
BASOPHILS # BLD AUTO: 0.05 10*3/MM3 (ref 0–0.2)
BASOPHILS NFR BLD AUTO: 0.7 % (ref 0–1.5)
BUN SERPL-MCNC: 45 MG/DL (ref 8–23)
BUN/CREAT SERPL: 23.6 (ref 7–25)
CALCIUM SPEC-SCNC: 10.2 MG/DL (ref 8.2–9.6)
CHLORIDE SERPL-SCNC: 100 MMOL/L (ref 98–107)
CO2 SERPL-SCNC: 24.9 MMOL/L (ref 22–29)
CREAT SERPL-MCNC: 1.91 MG/DL (ref 0.57–1)
DEPRECATED RDW RBC AUTO: 44.2 FL (ref 37–54)
EOSINOPHIL # BLD AUTO: 0.22 10*3/MM3 (ref 0–0.4)
EOSINOPHIL NFR BLD AUTO: 3.2 % (ref 0.3–6.2)
ERYTHROCYTE [DISTWIDTH] IN BLOOD BY AUTOMATED COUNT: 15 % (ref 12.3–15.4)
GFR SERPL CREATININE-BSD FRML MDRD: 25 ML/MIN/1.73
GLUCOSE BLDC GLUCOMTR-MCNC: 100 MG/DL (ref 70–99)
GLUCOSE BLDC GLUCOMTR-MCNC: 129 MG/DL (ref 70–99)
GLUCOSE BLDC GLUCOMTR-MCNC: 85 MG/DL (ref 70–99)
GLUCOSE BLDC GLUCOMTR-MCNC: 98 MG/DL (ref 70–99)
GLUCOSE SERPL-MCNC: 99 MG/DL (ref 65–99)
HCT VFR BLD AUTO: 33.4 % (ref 34–46.6)
HGB BLD-MCNC: 10.2 G/DL (ref 12–15.9)
IMM GRANULOCYTES # BLD AUTO: 0.04 10*3/MM3 (ref 0–0.05)
IMM GRANULOCYTES NFR BLD AUTO: 0.6 % (ref 0–0.5)
LYMPHOCYTES # BLD AUTO: 1.55 10*3/MM3 (ref 0.7–3.1)
LYMPHOCYTES NFR BLD AUTO: 22.7 % (ref 19.6–45.3)
M PNEUMO IGG SER IA-ACNC: 132 U/ML (ref 0–99)
M PNEUMO IGM SER IA-ACNC: <770 U/ML (ref 0–769)
MCH RBC QN AUTO: 24.8 PG (ref 26.6–33)
MCHC RBC AUTO-ENTMCNC: 30.5 G/DL (ref 31.5–35.7)
MCV RBC AUTO: 81.3 FL (ref 79–97)
MONOCYTES # BLD AUTO: 0.54 10*3/MM3 (ref 0.1–0.9)
MONOCYTES NFR BLD AUTO: 7.9 % (ref 5–12)
NEUTROPHILS NFR BLD AUTO: 4.44 10*3/MM3 (ref 1.7–7)
NEUTROPHILS NFR BLD AUTO: 64.9 % (ref 42.7–76)
NRBC BLD AUTO-RTO: 0 /100 WBC (ref 0–0.2)
PLATELET # BLD AUTO: 204 10*3/MM3 (ref 140–450)
PMV BLD AUTO: 9 FL (ref 6–12)
POTASSIUM SERPL-SCNC: 4.3 MMOL/L (ref 3.5–5.2)
RBC # BLD AUTO: 4.11 10*6/MM3 (ref 3.77–5.28)
SODIUM SERPL-SCNC: 137 MMOL/L (ref 136–145)
WBC NRBC COR # BLD: 6.84 10*3/MM3 (ref 3.4–10.8)

## 2021-12-17 PROCEDURE — 25010000002 ENOXAPARIN PER 10 MG: Performed by: INTERNAL MEDICINE

## 2021-12-17 PROCEDURE — 25010000002 CEFTRIAXONE PER 250 MG: Performed by: INTERNAL MEDICINE

## 2021-12-17 PROCEDURE — 25010000002 IRON SUCROSE PER 1 MG: Performed by: INTERNAL MEDICINE

## 2021-12-17 PROCEDURE — 94799 UNLISTED PULMONARY SVC/PX: CPT

## 2021-12-17 PROCEDURE — 63710000001 INSULIN DETEMIR PER 5 UNITS: Performed by: INTERNAL MEDICINE

## 2021-12-17 PROCEDURE — 80048 BASIC METABOLIC PNL TOTAL CA: CPT | Performed by: INTERNAL MEDICINE

## 2021-12-17 PROCEDURE — 97165 OT EVAL LOW COMPLEX 30 MIN: CPT

## 2021-12-17 PROCEDURE — 85025 COMPLETE CBC W/AUTO DIFF WBC: CPT | Performed by: INTERNAL MEDICINE

## 2021-12-17 PROCEDURE — 82962 GLUCOSE BLOOD TEST: CPT

## 2021-12-17 PROCEDURE — 25010000002 FUROSEMIDE PER 20 MG: Performed by: INTERNAL MEDICINE

## 2021-12-17 RX ADMIN — TRAMADOL HYDROCHLORIDE 50 MG: 50 TABLET, COATED ORAL at 16:12

## 2021-12-17 RX ADMIN — ACETAMINOPHEN 650 MG: 325 TABLET ORAL at 05:20

## 2021-12-17 RX ADMIN — GABAPENTIN 300 MG: 300 CAPSULE ORAL at 15:01

## 2021-12-17 RX ADMIN — FUROSEMIDE 40 MG: 10 INJECTION, SOLUTION INTRAVENOUS at 09:23

## 2021-12-17 RX ADMIN — ENOXAPARIN SODIUM 30 MG: 100 INJECTION SUBCUTANEOUS at 09:22

## 2021-12-17 RX ADMIN — METOPROLOL TARTRATE 50 MG: 50 TABLET, FILM COATED ORAL at 09:22

## 2021-12-17 RX ADMIN — SODIUM CHLORIDE, PRESERVATIVE FREE 10 ML: 5 INJECTION INTRAVENOUS at 09:23

## 2021-12-17 RX ADMIN — SODIUM CHLORIDE, PRESERVATIVE FREE 10 ML: 5 INJECTION INTRAVENOUS at 20:57

## 2021-12-17 RX ADMIN — TRAMADOL HYDROCHLORIDE 50 MG: 50 TABLET, COATED ORAL at 09:22

## 2021-12-17 RX ADMIN — IRON SUCROSE 400 MG: 20 INJECTION, SOLUTION INTRAVENOUS at 09:25

## 2021-12-17 RX ADMIN — GABAPENTIN 300 MG: 300 CAPSULE ORAL at 20:54

## 2021-12-17 RX ADMIN — CEFTRIAXONE SODIUM 1 G: 1 INJECTION, SOLUTION INTRAVENOUS at 17:39

## 2021-12-17 RX ADMIN — THEOPHYLLINE 300 MG: 300 TABLET, EXTENDED RELEASE ORAL at 09:22

## 2021-12-17 RX ADMIN — PANTOPRAZOLE SODIUM 40 MG: 40 TABLET, DELAYED RELEASE ORAL at 05:19

## 2021-12-17 RX ADMIN — THEOPHYLLINE 300 MG: 300 TABLET, EXTENDED RELEASE ORAL at 20:54

## 2021-12-17 RX ADMIN — INSULIN DETEMIR 40 UNITS: 100 INJECTION, SOLUTION SUBCUTANEOUS at 09:29

## 2021-12-17 RX ADMIN — DICLOFENAC SODIUM 2 G: 10 GEL TOPICAL at 20:54

## 2021-12-17 RX ADMIN — ROPINIROLE HYDROCHLORIDE 0.25 MG: 0.25 TABLET, FILM COATED ORAL at 20:54

## 2021-12-17 RX ADMIN — NYSTATIN 1 APPLICATION: 100000 CREAM TOPICAL at 09:26

## 2021-12-17 RX ADMIN — TRAMADOL HYDROCHLORIDE 50 MG: 50 TABLET, COATED ORAL at 02:35

## 2021-12-17 RX ADMIN — MULTIPLE VITAMINS W/ MINERALS TAB 1 TABLET: TAB at 09:22

## 2021-12-17 RX ADMIN — Medication 1 TABLET: at 09:20

## 2021-12-17 RX ADMIN — SODIUM CHLORIDE, PRESERVATIVE FREE 10 ML: 5 INJECTION INTRAVENOUS at 09:26

## 2021-12-17 RX ADMIN — ACETAMINOPHEN 650 MG: 325 TABLET ORAL at 20:54

## 2021-12-17 RX ADMIN — LISINOPRIL 2.5 MG: 2.5 TABLET ORAL at 09:20

## 2021-12-17 RX ADMIN — LORAZEPAM 0.5 MG: 0.5 TABLET ORAL at 09:20

## 2021-12-17 RX ADMIN — NYSTATIN 1 APPLICATION: 100000 CREAM TOPICAL at 20:53

## 2021-12-17 RX ADMIN — LORAZEPAM 0.5 MG: 0.5 TABLET ORAL at 20:54

## 2021-12-17 RX ADMIN — HYDROCHLOROTHIAZIDE 25 MG: 25 TABLET ORAL at 09:20

## 2021-12-17 RX ADMIN — INSULIN DETEMIR 40 UNITS: 100 INJECTION, SOLUTION SUBCUTANEOUS at 20:54

## 2021-12-17 RX ADMIN — GABAPENTIN 300 MG: 300 CAPSULE ORAL at 05:20

## 2021-12-17 RX ADMIN — ESTRADIOL 1 G: 0.1 CREAM VAGINAL at 20:53

## 2021-12-17 RX ADMIN — FOLIC ACID 1 MG: 1 TABLET ORAL at 09:22

## 2021-12-17 RX ADMIN — DICLOFENAC SODIUM 2 G: 10 GEL TOPICAL at 09:26

## 2021-12-18 ENCOUNTER — APPOINTMENT (OUTPATIENT)
Dept: GENERAL RADIOLOGY | Facility: HOSPITAL | Age: 86
End: 2021-12-18

## 2021-12-18 LAB
027 TOXIN: NORMAL
ANION GAP SERPL CALCULATED.3IONS-SCNC: 10.7 MMOL/L (ref 5–15)
BASOPHILS # BLD AUTO: 0.05 10*3/MM3 (ref 0–0.2)
BASOPHILS NFR BLD AUTO: 0.6 % (ref 0–1.5)
BUN SERPL-MCNC: 52 MG/DL (ref 8–23)
BUN/CREAT SERPL: 22.7 (ref 7–25)
C DIFF TOX GENS STL QL NAA+PROBE: NEGATIVE
CALCIUM SPEC-SCNC: 10 MG/DL (ref 8.2–9.6)
CHLORIDE SERPL-SCNC: 99 MMOL/L (ref 98–107)
CO2 SERPL-SCNC: 24.3 MMOL/L (ref 22–29)
CREAT SERPL-MCNC: 2.29 MG/DL (ref 0.57–1)
DEPRECATED RDW RBC AUTO: 47 FL (ref 37–54)
EOSINOPHIL # BLD AUTO: 0.29 10*3/MM3 (ref 0–0.4)
EOSINOPHIL NFR BLD AUTO: 3.5 % (ref 0.3–6.2)
ERYTHROCYTE [DISTWIDTH] IN BLOOD BY AUTOMATED COUNT: 15.3 % (ref 12.3–15.4)
GFR SERPL CREATININE-BSD FRML MDRD: 20 ML/MIN/1.73
GLUCOSE BLDC GLUCOMTR-MCNC: 109 MG/DL (ref 70–99)
GLUCOSE BLDC GLUCOMTR-MCNC: 114 MG/DL (ref 70–99)
GLUCOSE BLDC GLUCOMTR-MCNC: 142 MG/DL (ref 70–99)
GLUCOSE BLDC GLUCOMTR-MCNC: 44 MG/DL (ref 70–99)
GLUCOSE SERPL-MCNC: 37 MG/DL (ref 65–99)
HCT VFR BLD AUTO: 32.6 % (ref 34–46.6)
HEMOCCULT STL QL IA: NEGATIVE
HGB BLD-MCNC: 9.7 G/DL (ref 12–15.9)
IMM GRANULOCYTES # BLD AUTO: 0.03 10*3/MM3 (ref 0–0.05)
IMM GRANULOCYTES NFR BLD AUTO: 0.4 % (ref 0–0.5)
LYMPHOCYTES # BLD AUTO: 2.46 10*3/MM3 (ref 0.7–3.1)
LYMPHOCYTES NFR BLD AUTO: 30 % (ref 19.6–45.3)
MCH RBC QN AUTO: 25.2 PG (ref 26.6–33)
MCHC RBC AUTO-ENTMCNC: 29.8 G/DL (ref 31.5–35.7)
MCV RBC AUTO: 84.7 FL (ref 79–97)
MONOCYTES # BLD AUTO: 0.75 10*3/MM3 (ref 0.1–0.9)
MONOCYTES NFR BLD AUTO: 9.1 % (ref 5–12)
NEUTROPHILS NFR BLD AUTO: 4.63 10*3/MM3 (ref 1.7–7)
NEUTROPHILS NFR BLD AUTO: 56.4 % (ref 42.7–76)
NRBC BLD AUTO-RTO: 0 /100 WBC (ref 0–0.2)
PLATELET # BLD AUTO: 206 10*3/MM3 (ref 140–450)
PMV BLD AUTO: 8.8 FL (ref 6–12)
POTASSIUM SERPL-SCNC: 3.9 MMOL/L (ref 3.5–5.2)
RBC # BLD AUTO: 3.85 10*6/MM3 (ref 3.77–5.28)
SODIUM SERPL-SCNC: 134 MMOL/L (ref 136–145)
WBC NRBC COR # BLD: 8.21 10*3/MM3 (ref 3.4–10.8)

## 2021-12-18 PROCEDURE — 82962 GLUCOSE BLOOD TEST: CPT

## 2021-12-18 PROCEDURE — 94799 UNLISTED PULMONARY SVC/PX: CPT

## 2021-12-18 PROCEDURE — 82274 ASSAY TEST FOR BLOOD FECAL: CPT | Performed by: INTERNAL MEDICINE

## 2021-12-18 PROCEDURE — 25010000002 CEFTRIAXONE PER 250 MG: Performed by: INTERNAL MEDICINE

## 2021-12-18 PROCEDURE — 71045 X-RAY EXAM CHEST 1 VIEW: CPT

## 2021-12-18 PROCEDURE — 80048 BASIC METABOLIC PNL TOTAL CA: CPT | Performed by: INTERNAL MEDICINE

## 2021-12-18 PROCEDURE — 85025 COMPLETE CBC W/AUTO DIFF WBC: CPT | Performed by: INTERNAL MEDICINE

## 2021-12-18 PROCEDURE — 25010000002 IRON SUCROSE PER 1 MG: Performed by: INTERNAL MEDICINE

## 2021-12-18 PROCEDURE — 25010000002 ENOXAPARIN PER 10 MG: Performed by: INTERNAL MEDICINE

## 2021-12-18 PROCEDURE — 87505 NFCT AGENT DETECTION GI: CPT | Performed by: INTERNAL MEDICINE

## 2021-12-18 PROCEDURE — 87493 C DIFF AMPLIFIED PROBE: CPT | Performed by: INTERNAL MEDICINE

## 2021-12-18 RX ORDER — DEXTROSE MONOHYDRATE 100 MG/ML
25 INJECTION, SOLUTION INTRAVENOUS
Status: DISCONTINUED | OUTPATIENT
Start: 2021-12-18 | End: 2021-12-23 | Stop reason: SDUPTHER

## 2021-12-18 RX ORDER — NICOTINE POLACRILEX 4 MG
15 LOZENGE BUCCAL
Status: DISCONTINUED | OUTPATIENT
Start: 2021-12-18 | End: 2021-12-23 | Stop reason: SDUPTHER

## 2021-12-18 RX ADMIN — ACETAMINOPHEN 650 MG: 325 TABLET ORAL at 12:57

## 2021-12-18 RX ADMIN — ENOXAPARIN SODIUM 30 MG: 100 INJECTION SUBCUTANEOUS at 09:14

## 2021-12-18 RX ADMIN — GABAPENTIN 300 MG: 300 CAPSULE ORAL at 21:54

## 2021-12-18 RX ADMIN — LISINOPRIL 2.5 MG: 2.5 TABLET ORAL at 09:13

## 2021-12-18 RX ADMIN — ROPINIROLE HYDROCHLORIDE 0.25 MG: 0.25 TABLET, FILM COATED ORAL at 21:53

## 2021-12-18 RX ADMIN — GABAPENTIN 300 MG: 300 CAPSULE ORAL at 05:31

## 2021-12-18 RX ADMIN — THEOPHYLLINE 300 MG: 300 TABLET, EXTENDED RELEASE ORAL at 21:54

## 2021-12-18 RX ADMIN — CEFTRIAXONE SODIUM 1 G: 1 INJECTION, SOLUTION INTRAVENOUS at 18:19

## 2021-12-18 RX ADMIN — SODIUM CHLORIDE, PRESERVATIVE FREE 10 ML: 5 INJECTION INTRAVENOUS at 09:14

## 2021-12-18 RX ADMIN — PANTOPRAZOLE SODIUM 40 MG: 40 TABLET, DELAYED RELEASE ORAL at 05:31

## 2021-12-18 RX ADMIN — METOPROLOL TARTRATE 50 MG: 50 TABLET, FILM COATED ORAL at 21:54

## 2021-12-18 RX ADMIN — MULTIPLE VITAMINS W/ MINERALS TAB 1 TABLET: TAB at 09:13

## 2021-12-18 RX ADMIN — TRAMADOL HYDROCHLORIDE 50 MG: 50 TABLET, COATED ORAL at 05:33

## 2021-12-18 RX ADMIN — SODIUM CHLORIDE, PRESERVATIVE FREE 10 ML: 5 INJECTION INTRAVENOUS at 21:53

## 2021-12-18 RX ADMIN — LORAZEPAM 0.5 MG: 0.5 TABLET ORAL at 21:54

## 2021-12-18 RX ADMIN — METOPROLOL TARTRATE 50 MG: 50 TABLET, FILM COATED ORAL at 09:13

## 2021-12-18 RX ADMIN — ESTRADIOL 1 G: 0.1 CREAM VAGINAL at 21:56

## 2021-12-18 RX ADMIN — NYSTATIN 1 APPLICATION: 100000 CREAM TOPICAL at 21:53

## 2021-12-18 RX ADMIN — DICLOFENAC SODIUM 2 G: 10 GEL TOPICAL at 21:53

## 2021-12-18 RX ADMIN — THEOPHYLLINE 300 MG: 300 TABLET, EXTENDED RELEASE ORAL at 09:13

## 2021-12-18 RX ADMIN — DICLOFENAC SODIUM 2 G: 10 GEL TOPICAL at 09:17

## 2021-12-18 RX ADMIN — GABAPENTIN 300 MG: 300 CAPSULE ORAL at 12:57

## 2021-12-18 RX ADMIN — TRAMADOL HYDROCHLORIDE 50 MG: 50 TABLET, COATED ORAL at 21:54

## 2021-12-18 RX ADMIN — FOLIC ACID 1 MG: 1 TABLET ORAL at 09:14

## 2021-12-18 RX ADMIN — NYSTATIN 1 APPLICATION: 100000 CREAM TOPICAL at 09:16

## 2021-12-18 RX ADMIN — Medication 1 TABLET: at 09:13

## 2021-12-18 RX ADMIN — TRAMADOL HYDROCHLORIDE 50 MG: 50 TABLET, COATED ORAL at 14:56

## 2021-12-18 RX ADMIN — SODIUM CHLORIDE, PRESERVATIVE FREE 10 ML: 5 INJECTION INTRAVENOUS at 09:13

## 2021-12-18 RX ADMIN — LORAZEPAM 0.5 MG: 0.5 TABLET ORAL at 09:13

## 2021-12-18 RX ADMIN — IRON SUCROSE 400 MG: 20 INJECTION, SOLUTION INTRAVENOUS at 09:13

## 2021-12-19 LAB
ANION GAP SERPL CALCULATED.3IONS-SCNC: 9.7 MMOL/L (ref 5–15)
BASOPHILS # BLD AUTO: 0.05 10*3/MM3 (ref 0–0.2)
BASOPHILS NFR BLD AUTO: 0.7 % (ref 0–1.5)
BILIRUB UR QL STRIP: NEGATIVE
BUN SERPL-MCNC: 48 MG/DL (ref 8–23)
BUN/CREAT SERPL: 23.3 (ref 7–25)
C COLI+JEJ+UPSA DNA STL QL NAA+NON-PROBE: NOT DETECTED
CALCIUM SPEC-SCNC: 9.7 MG/DL (ref 8.2–9.6)
CHLORIDE SERPL-SCNC: 97 MMOL/L (ref 98–107)
CLARITY UR: CLEAR
CO2 SERPL-SCNC: 23.3 MMOL/L (ref 22–29)
COLOR UR: YELLOW
CREAT SERPL-MCNC: 2.06 MG/DL (ref 0.57–1)
DEPRECATED RDW RBC AUTO: 45.7 FL (ref 37–54)
EC STX1+STX2 GENES STL QL NAA+NON-PROBE: NOT DETECTED
EOSINOPHIL # BLD AUTO: 0.2 10*3/MM3 (ref 0–0.4)
EOSINOPHIL NFR BLD AUTO: 2.7 % (ref 0.3–6.2)
ERYTHROCYTE [DISTWIDTH] IN BLOOD BY AUTOMATED COUNT: 15.2 % (ref 12.3–15.4)
GFR SERPL CREATININE-BSD FRML MDRD: 23 ML/MIN/1.73
GLUCOSE BLDC GLUCOMTR-MCNC: 103 MG/DL (ref 70–99)
GLUCOSE BLDC GLUCOMTR-MCNC: 159 MG/DL (ref 70–99)
GLUCOSE BLDC GLUCOMTR-MCNC: 85 MG/DL (ref 70–99)
GLUCOSE SERPL-MCNC: 209 MG/DL (ref 65–99)
GLUCOSE UR STRIP-MCNC: NEGATIVE MG/DL
HCT VFR BLD AUTO: 31.4 % (ref 34–46.6)
HGB BLD-MCNC: 9.5 G/DL (ref 12–15.9)
HGB UR QL STRIP.AUTO: NEGATIVE
IMM GRANULOCYTES # BLD AUTO: 0.04 10*3/MM3 (ref 0–0.05)
IMM GRANULOCYTES NFR BLD AUTO: 0.5 % (ref 0–0.5)
KETONES UR QL STRIP: NEGATIVE
LEUKOCYTE ESTERASE UR QL STRIP.AUTO: NEGATIVE
LYMPHOCYTES # BLD AUTO: 1.3 10*3/MM3 (ref 0.7–3.1)
LYMPHOCYTES NFR BLD AUTO: 17.8 % (ref 19.6–45.3)
MCH RBC QN AUTO: 25.3 PG (ref 26.6–33)
MCHC RBC AUTO-ENTMCNC: 30.3 G/DL (ref 31.5–35.7)
MCV RBC AUTO: 83.5 FL (ref 79–97)
MONOCYTES # BLD AUTO: 0.78 10*3/MM3 (ref 0.1–0.9)
MONOCYTES NFR BLD AUTO: 10.7 % (ref 5–12)
NEUTROPHILS NFR BLD AUTO: 4.92 10*3/MM3 (ref 1.7–7)
NEUTROPHILS NFR BLD AUTO: 67.6 % (ref 42.7–76)
NITRITE UR QL STRIP: NEGATIVE
NRBC BLD AUTO-RTO: 0.3 /100 WBC (ref 0–0.2)
PH UR STRIP.AUTO: 6 [PH] (ref 5–8)
PLATELET # BLD AUTO: 193 10*3/MM3 (ref 140–450)
PMV BLD AUTO: 8.7 FL (ref 6–12)
POTASSIUM SERPL-SCNC: 4.8 MMOL/L (ref 3.5–5.2)
PROT UR QL STRIP: NEGATIVE
RBC # BLD AUTO: 3.76 10*6/MM3 (ref 3.77–5.28)
S ENT+BONG DNA STL QL NAA+NON-PROBE: NOT DETECTED
SHIGELLA SP+EIEC IPAH ST NAA+NON-PROBE: NOT DETECTED
SODIUM SERPL-SCNC: 130 MMOL/L (ref 136–145)
SP GR UR STRIP: 1.01 (ref 1–1.03)
UROBILINOGEN UR QL STRIP: NORMAL
WBC NRBC COR # BLD: 7.29 10*3/MM3 (ref 3.4–10.8)

## 2021-12-19 PROCEDURE — 87086 URINE CULTURE/COLONY COUNT: CPT | Performed by: INTERNAL MEDICINE

## 2021-12-19 PROCEDURE — 25010000002 AZITHROMYCIN PER 500 MG: Performed by: INTERNAL MEDICINE

## 2021-12-19 PROCEDURE — 94799 UNLISTED PULMONARY SVC/PX: CPT

## 2021-12-19 PROCEDURE — 25010000002 CEFTRIAXONE PER 250 MG: Performed by: INTERNAL MEDICINE

## 2021-12-19 PROCEDURE — 85025 COMPLETE CBC W/AUTO DIFF WBC: CPT | Performed by: INTERNAL MEDICINE

## 2021-12-19 PROCEDURE — 82962 GLUCOSE BLOOD TEST: CPT

## 2021-12-19 PROCEDURE — 81003 URINALYSIS AUTO W/O SCOPE: CPT | Performed by: INTERNAL MEDICINE

## 2021-12-19 PROCEDURE — 80048 BASIC METABOLIC PNL TOTAL CA: CPT | Performed by: INTERNAL MEDICINE

## 2021-12-19 PROCEDURE — 63710000001 INSULIN LISPRO (HUMAN) PER 5 UNITS: Performed by: INTERNAL MEDICINE

## 2021-12-19 PROCEDURE — 25010000002 ENOXAPARIN PER 10 MG: Performed by: INTERNAL MEDICINE

## 2021-12-19 RX ORDER — IPRATROPIUM BROMIDE AND ALBUTEROL SULFATE 2.5; .5 MG/3ML; MG/3ML
3 SOLUTION RESPIRATORY (INHALATION)
Status: DISCONTINUED | OUTPATIENT
Start: 2021-12-19 | End: 2021-12-23

## 2021-12-19 RX ORDER — ARFORMOTEROL TARTRATE 15 UG/2ML
15 SOLUTION RESPIRATORY (INHALATION)
Status: DISCONTINUED | OUTPATIENT
Start: 2021-12-19 | End: 2021-12-26 | Stop reason: HOSPADM

## 2021-12-19 RX ORDER — BUDESONIDE 0.5 MG/2ML
0.5 INHALANT ORAL
Status: DISCONTINUED | OUTPATIENT
Start: 2021-12-19 | End: 2021-12-26 | Stop reason: HOSPADM

## 2021-12-19 RX ADMIN — ACETAMINOPHEN 650 MG: 325 TABLET ORAL at 09:52

## 2021-12-19 RX ADMIN — LORAZEPAM 0.5 MG: 0.5 TABLET ORAL at 09:53

## 2021-12-19 RX ADMIN — AZITHROMYCIN DIHYDRATE 500 MG: 500 INJECTION, POWDER, LYOPHILIZED, FOR SOLUTION INTRAVENOUS at 21:56

## 2021-12-19 RX ADMIN — PANTOPRAZOLE SODIUM 40 MG: 40 TABLET, DELAYED RELEASE ORAL at 05:59

## 2021-12-19 RX ADMIN — CEFTRIAXONE SODIUM 1 G: 1 INJECTION, SOLUTION INTRAVENOUS at 14:45

## 2021-12-19 RX ADMIN — MULTIPLE VITAMINS W/ MINERALS TAB 1 TABLET: TAB at 09:52

## 2021-12-19 RX ADMIN — ESTRADIOL 1 G: 0.1 CREAM VAGINAL at 21:57

## 2021-12-19 RX ADMIN — SODIUM CHLORIDE, PRESERVATIVE FREE 10 ML: 5 INJECTION INTRAVENOUS at 14:43

## 2021-12-19 RX ADMIN — GABAPENTIN 300 MG: 300 CAPSULE ORAL at 14:45

## 2021-12-19 RX ADMIN — DICLOFENAC SODIUM 2 G: 10 GEL TOPICAL at 21:57

## 2021-12-19 RX ADMIN — Medication 1 TABLET: at 09:52

## 2021-12-19 RX ADMIN — METOPROLOL TARTRATE 50 MG: 50 TABLET, FILM COATED ORAL at 21:56

## 2021-12-19 RX ADMIN — FOLIC ACID 1 MG: 1 TABLET ORAL at 09:52

## 2021-12-19 RX ADMIN — THEOPHYLLINE 300 MG: 300 TABLET, EXTENDED RELEASE ORAL at 21:56

## 2021-12-19 RX ADMIN — INSULIN LISPRO 10 UNITS: 100 INJECTION, SOLUTION INTRAVENOUS; SUBCUTANEOUS at 09:52

## 2021-12-19 RX ADMIN — TRAMADOL HYDROCHLORIDE 50 MG: 50 TABLET, COATED ORAL at 06:00

## 2021-12-19 RX ADMIN — NYSTATIN 1 APPLICATION: 100000 CREAM TOPICAL at 09:53

## 2021-12-19 RX ADMIN — ROPINIROLE HYDROCHLORIDE 0.25 MG: 0.25 TABLET, FILM COATED ORAL at 21:56

## 2021-12-19 RX ADMIN — ENOXAPARIN SODIUM 30 MG: 100 INJECTION SUBCUTANEOUS at 09:52

## 2021-12-19 RX ADMIN — TRAMADOL HYDROCHLORIDE 50 MG: 50 TABLET, COATED ORAL at 14:45

## 2021-12-19 RX ADMIN — SODIUM CHLORIDE, PRESERVATIVE FREE 10 ML: 5 INJECTION INTRAVENOUS at 21:58

## 2021-12-19 RX ADMIN — LORAZEPAM 0.5 MG: 0.5 TABLET ORAL at 21:56

## 2021-12-19 RX ADMIN — SODIUM CHLORIDE, PRESERVATIVE FREE 10 ML: 5 INJECTION INTRAVENOUS at 21:57

## 2021-12-19 RX ADMIN — LISINOPRIL 2.5 MG: 2.5 TABLET ORAL at 09:52

## 2021-12-19 RX ADMIN — DICLOFENAC SODIUM 2 G: 10 GEL TOPICAL at 09:53

## 2021-12-19 RX ADMIN — ACETAMINOPHEN 650 MG: 325 TABLET ORAL at 00:48

## 2021-12-19 RX ADMIN — GABAPENTIN 300 MG: 300 CAPSULE ORAL at 06:00

## 2021-12-19 RX ADMIN — METOPROLOL TARTRATE 50 MG: 50 TABLET, FILM COATED ORAL at 09:53

## 2021-12-19 RX ADMIN — GABAPENTIN 300 MG: 300 CAPSULE ORAL at 21:56

## 2021-12-19 RX ADMIN — THEOPHYLLINE 300 MG: 300 TABLET, EXTENDED RELEASE ORAL at 09:52

## 2021-12-20 ENCOUNTER — TELEPHONE (OUTPATIENT)
Dept: CARDIOLOGY | Facility: CLINIC | Age: 86
End: 2021-12-20

## 2021-12-20 ENCOUNTER — APPOINTMENT (OUTPATIENT)
Dept: CT IMAGING | Facility: HOSPITAL | Age: 86
End: 2021-12-20

## 2021-12-20 LAB
BACTERIA SPEC AEROBE CULT: NO GROWTH
BACTERIA SPEC AEROBE CULT: NORMAL
BACTERIA SPEC AEROBE CULT: NORMAL
GLUCOSE BLDC GLUCOMTR-MCNC: 127 MG/DL (ref 70–99)
GLUCOSE BLDC GLUCOMTR-MCNC: 163 MG/DL (ref 70–99)
GLUCOSE BLDC GLUCOMTR-MCNC: 186 MG/DL (ref 70–99)
GLUCOSE BLDC GLUCOMTR-MCNC: 188 MG/DL (ref 70–99)

## 2021-12-20 PROCEDURE — 0 IOHEXOL 12 MG/ML SOLUTION: Performed by: INTERNAL MEDICINE

## 2021-12-20 PROCEDURE — 94640 AIRWAY INHALATION TREATMENT: CPT

## 2021-12-20 PROCEDURE — 82962 GLUCOSE BLOOD TEST: CPT

## 2021-12-20 PROCEDURE — 25010000002 AZITHROMYCIN PER 500 MG: Performed by: INTERNAL MEDICINE

## 2021-12-20 PROCEDURE — 63710000001 INSULIN LISPRO (HUMAN) PER 5 UNITS: Performed by: INTERNAL MEDICINE

## 2021-12-20 PROCEDURE — 71250 CT THORAX DX C-: CPT

## 2021-12-20 PROCEDURE — 25010000002 CEFTRIAXONE PER 250 MG: Performed by: INTERNAL MEDICINE

## 2021-12-20 PROCEDURE — 74176 CT ABD & PELVIS W/O CONTRAST: CPT

## 2021-12-20 PROCEDURE — 94799 UNLISTED PULMONARY SVC/PX: CPT

## 2021-12-20 PROCEDURE — 94760 N-INVAS EAR/PLS OXIMETRY 1: CPT

## 2021-12-20 PROCEDURE — 25010000002 ENOXAPARIN PER 10 MG: Performed by: INTERNAL MEDICINE

## 2021-12-20 RX ADMIN — LISINOPRIL 2.5 MG: 2.5 TABLET ORAL at 08:28

## 2021-12-20 RX ADMIN — IOHEXOL 250 ML: 12 SOLUTION ORAL at 12:33

## 2021-12-20 RX ADMIN — ENOXAPARIN SODIUM 30 MG: 100 INJECTION SUBCUTANEOUS at 08:28

## 2021-12-20 RX ADMIN — IOHEXOL 250 ML: 12 SOLUTION ORAL at 10:34

## 2021-12-20 RX ADMIN — SODIUM CHLORIDE, PRESERVATIVE FREE 10 ML: 5 INJECTION INTRAVENOUS at 08:30

## 2021-12-20 RX ADMIN — INSULIN LISPRO 10 UNITS: 100 INJECTION, SOLUTION INTRAVENOUS; SUBCUTANEOUS at 08:28

## 2021-12-20 RX ADMIN — AZITHROMYCIN DIHYDRATE 500 MG: 500 INJECTION, POWDER, LYOPHILIZED, FOR SOLUTION INTRAVENOUS at 20:58

## 2021-12-20 RX ADMIN — THEOPHYLLINE 300 MG: 300 TABLET, EXTENDED RELEASE ORAL at 08:28

## 2021-12-20 RX ADMIN — DICLOFENAC SODIUM 2 G: 10 GEL TOPICAL at 08:28

## 2021-12-20 RX ADMIN — METOPROLOL TARTRATE 50 MG: 50 TABLET, FILM COATED ORAL at 20:56

## 2021-12-20 RX ADMIN — GABAPENTIN 300 MG: 300 CAPSULE ORAL at 21:58

## 2021-12-20 RX ADMIN — GABAPENTIN 300 MG: 300 CAPSULE ORAL at 13:13

## 2021-12-20 RX ADMIN — SODIUM CHLORIDE, PRESERVATIVE FREE 10 ML: 5 INJECTION INTRAVENOUS at 20:58

## 2021-12-20 RX ADMIN — BUDESONIDE 0.5 MG: 0.5 SUSPENSION RESPIRATORY (INHALATION) at 19:13

## 2021-12-20 RX ADMIN — INSULIN LISPRO 10 UNITS: 100 INJECTION, SOLUTION INTRAVENOUS; SUBCUTANEOUS at 12:33

## 2021-12-20 RX ADMIN — TRAMADOL HYDROCHLORIDE 50 MG: 50 TABLET, COATED ORAL at 04:29

## 2021-12-20 RX ADMIN — IPRATROPIUM BROMIDE AND ALBUTEROL SULFATE 3 ML: .5; 2.5 SOLUTION RESPIRATORY (INHALATION) at 12:09

## 2021-12-20 RX ADMIN — TRAMADOL HYDROCHLORIDE 50 MG: 50 TABLET, COATED ORAL at 12:33

## 2021-12-20 RX ADMIN — MULTIPLE VITAMINS W/ MINERALS TAB 1 TABLET: TAB at 08:28

## 2021-12-20 RX ADMIN — ARFORMOTEROL TARTRATE 15 MCG: 15 SOLUTION RESPIRATORY (INHALATION) at 19:13

## 2021-12-20 RX ADMIN — LORAZEPAM 0.5 MG: 0.5 TABLET ORAL at 20:56

## 2021-12-20 RX ADMIN — Medication 1 TABLET: at 08:28

## 2021-12-20 RX ADMIN — TRAMADOL HYDROCHLORIDE 50 MG: 50 TABLET, COATED ORAL at 20:56

## 2021-12-20 RX ADMIN — IPRATROPIUM BROMIDE AND ALBUTEROL SULFATE 3 ML: .5; 2.5 SOLUTION RESPIRATORY (INHALATION) at 19:13

## 2021-12-20 RX ADMIN — PANTOPRAZOLE SODIUM 40 MG: 40 TABLET, DELAYED RELEASE ORAL at 06:39

## 2021-12-20 RX ADMIN — LORAZEPAM 0.5 MG: 0.5 TABLET ORAL at 08:28

## 2021-12-20 RX ADMIN — DICLOFENAC SODIUM 2 G: 10 GEL TOPICAL at 20:58

## 2021-12-20 RX ADMIN — ESTRADIOL 1 G: 0.1 CREAM VAGINAL at 20:57

## 2021-12-20 RX ADMIN — THEOPHYLLINE 300 MG: 300 TABLET, EXTENDED RELEASE ORAL at 20:56

## 2021-12-20 RX ADMIN — FOLIC ACID 1 MG: 1 TABLET ORAL at 08:28

## 2021-12-20 RX ADMIN — CEFTRIAXONE SODIUM 1 G: 1 INJECTION, SOLUTION INTRAVENOUS at 17:46

## 2021-12-20 RX ADMIN — METOPROLOL TARTRATE 50 MG: 50 TABLET, FILM COATED ORAL at 08:28

## 2021-12-20 RX ADMIN — ROPINIROLE HYDROCHLORIDE 0.25 MG: 0.25 TABLET, FILM COATED ORAL at 20:56

## 2021-12-20 RX ADMIN — GABAPENTIN 300 MG: 300 CAPSULE ORAL at 06:39

## 2021-12-20 RX ADMIN — NYSTATIN 1 APPLICATION: 100000 CREAM TOPICAL at 20:56

## 2021-12-21 ENCOUNTER — APPOINTMENT (OUTPATIENT)
Dept: NUCLEAR MEDICINE | Facility: HOSPITAL | Age: 86
End: 2021-12-21

## 2021-12-21 LAB
GLUCOSE BLDC GLUCOMTR-MCNC: 169 MG/DL (ref 70–99)
GLUCOSE BLDC GLUCOMTR-MCNC: 178 MG/DL (ref 70–99)
GLUCOSE BLDC GLUCOMTR-MCNC: 179 MG/DL (ref 70–99)

## 2021-12-21 PROCEDURE — 25010000002 ENOXAPARIN PER 10 MG: Performed by: INTERNAL MEDICINE

## 2021-12-21 PROCEDURE — 94799 UNLISTED PULMONARY SVC/PX: CPT

## 2021-12-21 PROCEDURE — 25010000002 CEFTRIAXONE PER 250 MG: Performed by: INTERNAL MEDICINE

## 2021-12-21 PROCEDURE — 0 TECHNETIUM MEDRONATE KIT: Performed by: INTERNAL MEDICINE

## 2021-12-21 PROCEDURE — 25010000002 AZITHROMYCIN PER 500 MG: Performed by: INTERNAL MEDICINE

## 2021-12-21 PROCEDURE — 97110 THERAPEUTIC EXERCISES: CPT

## 2021-12-21 PROCEDURE — 63710000001 INSULIN LISPRO (HUMAN) PER 5 UNITS: Performed by: INTERNAL MEDICINE

## 2021-12-21 PROCEDURE — 78306 BONE IMAGING WHOLE BODY: CPT

## 2021-12-21 PROCEDURE — 97530 THERAPEUTIC ACTIVITIES: CPT

## 2021-12-21 PROCEDURE — 82962 GLUCOSE BLOOD TEST: CPT

## 2021-12-21 PROCEDURE — A9503 TC99M MEDRONATE: HCPCS | Performed by: INTERNAL MEDICINE

## 2021-12-21 RX ORDER — TC 99M MEDRONATE 20 MG/10ML
22.5 INJECTION, POWDER, LYOPHILIZED, FOR SOLUTION INTRAVENOUS
Status: COMPLETED | OUTPATIENT
Start: 2021-12-21 | End: 2021-12-21

## 2021-12-21 RX ADMIN — MULTIPLE VITAMINS W/ MINERALS TAB 1 TABLET: TAB at 08:50

## 2021-12-21 RX ADMIN — DICLOFENAC SODIUM 2 G: 10 GEL TOPICAL at 21:47

## 2021-12-21 RX ADMIN — BUDESONIDE 0.5 MG: 0.5 SUSPENSION RESPIRATORY (INHALATION) at 19:14

## 2021-12-21 RX ADMIN — METOPROLOL TARTRATE 50 MG: 50 TABLET, FILM COATED ORAL at 08:50

## 2021-12-21 RX ADMIN — METOPROLOL TARTRATE 50 MG: 50 TABLET, FILM COATED ORAL at 21:47

## 2021-12-21 RX ADMIN — TC 99M MEDRONATE 22.5 MILLICURIE: 20 INJECTION, POWDER, LYOPHILIZED, FOR SOLUTION INTRAVENOUS at 12:40

## 2021-12-21 RX ADMIN — LISINOPRIL 2.5 MG: 2.5 TABLET ORAL at 08:49

## 2021-12-21 RX ADMIN — IPRATROPIUM BROMIDE AND ALBUTEROL SULFATE 3 ML: .5; 2.5 SOLUTION RESPIRATORY (INHALATION) at 00:27

## 2021-12-21 RX ADMIN — ESTRADIOL 1 G: 0.1 CREAM VAGINAL at 21:47

## 2021-12-21 RX ADMIN — SODIUM CHLORIDE, PRESERVATIVE FREE 10 ML: 5 INJECTION INTRAVENOUS at 21:47

## 2021-12-21 RX ADMIN — NYSTATIN: 100000 CREAM TOPICAL at 21:47

## 2021-12-21 RX ADMIN — ACETAMINOPHEN 650 MG: 325 TABLET ORAL at 23:52

## 2021-12-21 RX ADMIN — Medication 1 TABLET: at 08:50

## 2021-12-21 RX ADMIN — ARFORMOTEROL TARTRATE 15 MCG: 15 SOLUTION RESPIRATORY (INHALATION) at 19:14

## 2021-12-21 RX ADMIN — IPRATROPIUM BROMIDE AND ALBUTEROL SULFATE 3 ML: .5; 2.5 SOLUTION RESPIRATORY (INHALATION) at 13:31

## 2021-12-21 RX ADMIN — IPRATROPIUM BROMIDE AND ALBUTEROL SULFATE 3 ML: .5; 2.5 SOLUTION RESPIRATORY (INHALATION) at 07:12

## 2021-12-21 RX ADMIN — ENOXAPARIN SODIUM 30 MG: 100 INJECTION SUBCUTANEOUS at 08:49

## 2021-12-21 RX ADMIN — INSULIN LISPRO 10 UNITS: 100 INJECTION, SOLUTION INTRAVENOUS; SUBCUTANEOUS at 12:57

## 2021-12-21 RX ADMIN — IPRATROPIUM BROMIDE AND ALBUTEROL SULFATE 3 ML: .5; 2.5 SOLUTION RESPIRATORY (INHALATION) at 19:14

## 2021-12-21 RX ADMIN — CEFTRIAXONE SODIUM 1 G: 1 INJECTION, SOLUTION INTRAVENOUS at 18:11

## 2021-12-21 RX ADMIN — LORAZEPAM 0.5 MG: 0.5 TABLET ORAL at 21:47

## 2021-12-21 RX ADMIN — DICLOFENAC SODIUM 2 G: 10 GEL TOPICAL at 09:16

## 2021-12-21 RX ADMIN — SODIUM CHLORIDE, PRESERVATIVE FREE 10 ML: 5 INJECTION INTRAVENOUS at 08:51

## 2021-12-21 RX ADMIN — PANTOPRAZOLE SODIUM 40 MG: 40 TABLET, DELAYED RELEASE ORAL at 06:07

## 2021-12-21 RX ADMIN — TRAMADOL HYDROCHLORIDE 50 MG: 50 TABLET, COATED ORAL at 08:50

## 2021-12-21 RX ADMIN — LORAZEPAM 0.5 MG: 0.5 TABLET ORAL at 08:50

## 2021-12-21 RX ADMIN — BUDESONIDE 0.5 MG: 0.5 SUSPENSION RESPIRATORY (INHALATION) at 07:12

## 2021-12-21 RX ADMIN — GABAPENTIN 300 MG: 300 CAPSULE ORAL at 21:47

## 2021-12-21 RX ADMIN — GABAPENTIN 300 MG: 300 CAPSULE ORAL at 06:07

## 2021-12-21 RX ADMIN — THEOPHYLLINE 300 MG: 300 TABLET, EXTENDED RELEASE ORAL at 08:50

## 2021-12-21 RX ADMIN — SODIUM CHLORIDE, PRESERVATIVE FREE 10 ML: 5 INJECTION INTRAVENOUS at 21:56

## 2021-12-21 RX ADMIN — ARFORMOTEROL TARTRATE 15 MCG: 15 SOLUTION RESPIRATORY (INHALATION) at 07:12

## 2021-12-21 RX ADMIN — FOLIC ACID 1 MG: 1 TABLET ORAL at 08:50

## 2021-12-21 RX ADMIN — INSULIN LISPRO 10 UNITS: 100 INJECTION, SOLUTION INTRAVENOUS; SUBCUTANEOUS at 08:49

## 2021-12-21 RX ADMIN — GABAPENTIN 300 MG: 300 CAPSULE ORAL at 13:00

## 2021-12-21 RX ADMIN — THEOPHYLLINE 300 MG: 300 TABLET, EXTENDED RELEASE ORAL at 21:47

## 2021-12-21 RX ADMIN — INSULIN LISPRO 10 UNITS: 100 INJECTION, SOLUTION INTRAVENOUS; SUBCUTANEOUS at 18:11

## 2021-12-21 RX ADMIN — ROPINIROLE HYDROCHLORIDE 0.25 MG: 0.25 TABLET, FILM COATED ORAL at 21:56

## 2021-12-21 RX ADMIN — TRAMADOL HYDROCHLORIDE 50 MG: 50 TABLET, COATED ORAL at 23:52

## 2021-12-21 RX ADMIN — SODIUM CHLORIDE, PRESERVATIVE FREE 10 ML: 5 INJECTION INTRAVENOUS at 08:50

## 2021-12-21 RX ADMIN — AZITHROMYCIN DIHYDRATE 500 MG: 500 INJECTION, POWDER, LYOPHILIZED, FOR SOLUTION INTRAVENOUS at 21:47

## 2021-12-22 PROBLEM — R91.8 HILAR MASS: Status: ACTIVE | Noted: 2021-12-15

## 2021-12-22 LAB
GLUCOSE BLDC GLUCOMTR-MCNC: 178 MG/DL (ref 70–99)
GLUCOSE BLDC GLUCOMTR-MCNC: 242 MG/DL (ref 70–99)
GLUCOSE BLDC GLUCOMTR-MCNC: 257 MG/DL (ref 70–99)

## 2021-12-22 PROCEDURE — 25010000002 ENOXAPARIN PER 10 MG: Performed by: INTERNAL MEDICINE

## 2021-12-22 PROCEDURE — 25010000002 AZITHROMYCIN PER 500 MG: Performed by: INTERNAL MEDICINE

## 2021-12-22 PROCEDURE — 94799 UNLISTED PULMONARY SVC/PX: CPT

## 2021-12-22 PROCEDURE — 82962 GLUCOSE BLOOD TEST: CPT

## 2021-12-22 PROCEDURE — 63710000001 INSULIN LISPRO (HUMAN) PER 5 UNITS: Performed by: INTERNAL MEDICINE

## 2021-12-22 PROCEDURE — 99223 1ST HOSP IP/OBS HIGH 75: CPT | Performed by: INTERNAL MEDICINE

## 2021-12-22 PROCEDURE — 94640 AIRWAY INHALATION TREATMENT: CPT

## 2021-12-22 RX ADMIN — FOLIC ACID 1 MG: 1 TABLET ORAL at 09:29

## 2021-12-22 RX ADMIN — ACETAMINOPHEN 650 MG: 325 TABLET ORAL at 21:10

## 2021-12-22 RX ADMIN — METOPROLOL TARTRATE 50 MG: 50 TABLET, FILM COATED ORAL at 21:10

## 2021-12-22 RX ADMIN — DICLOFENAC SODIUM 2 G: 10 GEL TOPICAL at 21:12

## 2021-12-22 RX ADMIN — PANTOPRAZOLE SODIUM 40 MG: 40 TABLET, DELAYED RELEASE ORAL at 06:05

## 2021-12-22 RX ADMIN — NYSTATIN: 100000 CREAM TOPICAL at 21:12

## 2021-12-22 RX ADMIN — LORAZEPAM 0.5 MG: 0.5 TABLET ORAL at 21:10

## 2021-12-22 RX ADMIN — ARFORMOTEROL TARTRATE 15 MCG: 15 SOLUTION RESPIRATORY (INHALATION) at 19:00

## 2021-12-22 RX ADMIN — SODIUM CHLORIDE, PRESERVATIVE FREE 10 ML: 5 INJECTION INTRAVENOUS at 09:30

## 2021-12-22 RX ADMIN — INSULIN LISPRO 10 UNITS: 100 INJECTION, SOLUTION INTRAVENOUS; SUBCUTANEOUS at 12:40

## 2021-12-22 RX ADMIN — LISINOPRIL 2.5 MG: 2.5 TABLET ORAL at 09:29

## 2021-12-22 RX ADMIN — IPRATROPIUM BROMIDE AND ALBUTEROL SULFATE 3 ML: .5; 2.5 SOLUTION RESPIRATORY (INHALATION) at 07:46

## 2021-12-22 RX ADMIN — TRAMADOL HYDROCHLORIDE 50 MG: 50 TABLET, COATED ORAL at 21:11

## 2021-12-22 RX ADMIN — GABAPENTIN 300 MG: 300 CAPSULE ORAL at 21:10

## 2021-12-22 RX ADMIN — THEOPHYLLINE 300 MG: 300 TABLET, EXTENDED RELEASE ORAL at 21:11

## 2021-12-22 RX ADMIN — BUDESONIDE 0.5 MG: 0.5 SUSPENSION RESPIRATORY (INHALATION) at 19:00

## 2021-12-22 RX ADMIN — SODIUM CHLORIDE, PRESERVATIVE FREE 10 ML: 5 INJECTION INTRAVENOUS at 21:38

## 2021-12-22 RX ADMIN — AZITHROMYCIN DIHYDRATE 500 MG: 500 INJECTION, POWDER, LYOPHILIZED, FOR SOLUTION INTRAVENOUS at 21:38

## 2021-12-22 RX ADMIN — ENOXAPARIN SODIUM 30 MG: 100 INJECTION SUBCUTANEOUS at 09:28

## 2021-12-22 RX ADMIN — GABAPENTIN 300 MG: 300 CAPSULE ORAL at 06:05

## 2021-12-22 RX ADMIN — TRAMADOL HYDROCHLORIDE 50 MG: 50 TABLET, COATED ORAL at 06:05

## 2021-12-22 RX ADMIN — ACETAMINOPHEN 650 MG: 325 TABLET ORAL at 06:05

## 2021-12-22 RX ADMIN — MULTIPLE VITAMINS W/ MINERALS TAB 1 TABLET: TAB at 09:29

## 2021-12-22 RX ADMIN — GABAPENTIN 300 MG: 300 CAPSULE ORAL at 15:04

## 2021-12-22 RX ADMIN — INSULIN LISPRO 10 UNITS: 100 INJECTION, SOLUTION INTRAVENOUS; SUBCUTANEOUS at 08:57

## 2021-12-22 RX ADMIN — IPRATROPIUM BROMIDE AND ALBUTEROL SULFATE 3 ML: .5; 2.5 SOLUTION RESPIRATORY (INHALATION) at 19:00

## 2021-12-22 RX ADMIN — SODIUM CHLORIDE, PRESERVATIVE FREE 10 ML: 5 INJECTION INTRAVENOUS at 09:29

## 2021-12-22 RX ADMIN — BUDESONIDE 0.5 MG: 0.5 SUSPENSION RESPIRATORY (INHALATION) at 07:46

## 2021-12-22 RX ADMIN — LORAZEPAM 0.5 MG: 0.5 TABLET ORAL at 09:29

## 2021-12-22 RX ADMIN — INSULIN LISPRO 10 UNITS: 100 INJECTION, SOLUTION INTRAVENOUS; SUBCUTANEOUS at 17:18

## 2021-12-22 RX ADMIN — IPRATROPIUM BROMIDE AND ALBUTEROL SULFATE 3 ML: .5; 2.5 SOLUTION RESPIRATORY (INHALATION) at 00:21

## 2021-12-22 RX ADMIN — Medication 1 TABLET: at 09:28

## 2021-12-22 RX ADMIN — ESTRADIOL 1 G: 0.1 CREAM VAGINAL at 21:13

## 2021-12-22 RX ADMIN — SODIUM CHLORIDE, PRESERVATIVE FREE 10 ML: 5 INJECTION INTRAVENOUS at 21:11

## 2021-12-22 RX ADMIN — METOPROLOL TARTRATE 50 MG: 50 TABLET, FILM COATED ORAL at 09:29

## 2021-12-22 RX ADMIN — THEOPHYLLINE 300 MG: 300 TABLET, EXTENDED RELEASE ORAL at 09:29

## 2021-12-22 RX ADMIN — ROPINIROLE HYDROCHLORIDE 0.25 MG: 0.25 TABLET, FILM COATED ORAL at 21:10

## 2021-12-22 RX ADMIN — ARFORMOTEROL TARTRATE 15 MCG: 15 SOLUTION RESPIRATORY (INHALATION) at 07:46

## 2021-12-22 RX ADMIN — DICLOFENAC SODIUM 2 G: 10 GEL TOPICAL at 09:29

## 2021-12-23 ENCOUNTER — APPOINTMENT (OUTPATIENT)
Dept: MRI IMAGING | Facility: HOSPITAL | Age: 86
End: 2021-12-23

## 2021-12-23 ENCOUNTER — ANESTHESIA (OUTPATIENT)
Dept: GASTROENTEROLOGY | Facility: HOSPITAL | Age: 86
End: 2021-12-23

## 2021-12-23 ENCOUNTER — ANESTHESIA EVENT (OUTPATIENT)
Dept: GASTROENTEROLOGY | Facility: HOSPITAL | Age: 86
End: 2021-12-23

## 2021-12-23 LAB
ANION GAP SERPL CALCULATED.3IONS-SCNC: 8.7 MMOL/L (ref 5–15)
BUN SERPL-MCNC: 34 MG/DL (ref 8–23)
BUN/CREAT SERPL: 23.1 (ref 7–25)
CALCIUM SPEC-SCNC: 10.3 MG/DL (ref 8.2–9.6)
CHLORIDE SERPL-SCNC: 99 MMOL/L (ref 98–107)
CO2 SERPL-SCNC: 24.3 MMOL/L (ref 22–29)
CREAT SERPL-MCNC: 1.47 MG/DL (ref 0.57–1)
GFR SERPL CREATININE-BSD FRML MDRD: 33 ML/MIN/1.73
GLUCOSE BLDC GLUCOMTR-MCNC: 152 MG/DL (ref 70–99)
GLUCOSE BLDC GLUCOMTR-MCNC: 180 MG/DL (ref 70–99)
GLUCOSE BLDC GLUCOMTR-MCNC: 222 MG/DL (ref 70–99)
GLUCOSE SERPL-MCNC: 169 MG/DL (ref 65–99)
POTASSIUM SERPL-SCNC: 5.3 MMOL/L (ref 3.5–5.2)
SODIUM SERPL-SCNC: 132 MMOL/L (ref 136–145)

## 2021-12-23 PROCEDURE — 88342 IMHCHEM/IMCYTCHM 1ST ANTB: CPT | Performed by: INTERNAL MEDICINE

## 2021-12-23 PROCEDURE — 88274 CYTOGENETICS 25-99: CPT

## 2021-12-23 PROCEDURE — 25010000002 PROPOFOL 10 MG/ML EMULSION: Performed by: NURSE ANESTHETIST, CERTIFIED REGISTERED

## 2021-12-23 PROCEDURE — 99232 SBSQ HOSP IP/OBS MODERATE 35: CPT | Performed by: INTERNAL MEDICINE

## 2021-12-23 PROCEDURE — 94799 UNLISTED PULMONARY SVC/PX: CPT

## 2021-12-23 PROCEDURE — 63710000001 INSULIN LISPRO (HUMAN) PER 5 UNITS: Performed by: INTERNAL MEDICINE

## 2021-12-23 PROCEDURE — 70553 MRI BRAIN STEM W/O & W/DYE: CPT

## 2021-12-23 PROCEDURE — 31652 BRONCH EBUS SAMPLNG 1/2 NODE: CPT | Performed by: INTERNAL MEDICINE

## 2021-12-23 PROCEDURE — 87205 SMEAR GRAM STAIN: CPT | Performed by: INTERNAL MEDICINE

## 2021-12-23 PROCEDURE — 87633 RESP VIRUS 12-25 TARGETS: CPT | Performed by: INTERNAL MEDICINE

## 2021-12-23 PROCEDURE — 31625 BRONCHOSCOPY W/BIOPSY(S): CPT | Performed by: INTERNAL MEDICINE

## 2021-12-23 PROCEDURE — 31624 DX BRONCHOSCOPE/LAVAGE: CPT | Performed by: INTERNAL MEDICINE

## 2021-12-23 PROCEDURE — 88104 CYTOPATH FL NONGYN SMEARS: CPT | Performed by: INTERNAL MEDICINE

## 2021-12-23 PROCEDURE — 0BB68ZX EXCISION OF RIGHT LOWER LOBE BRONCHUS, VIA NATURAL OR ARTIFICIAL OPENING ENDOSCOPIC, DIAGNOSTIC: ICD-10-PCS | Performed by: INTERNAL MEDICINE

## 2021-12-23 PROCEDURE — 0 GADOBENATE DIMEGLUMINE 529 MG/ML SOLUTION: Performed by: INTERNAL MEDICINE

## 2021-12-23 PROCEDURE — 31623 DX BRONCHOSCOPE/BRUSH: CPT | Performed by: INTERNAL MEDICINE

## 2021-12-23 PROCEDURE — 80048 BASIC METABOLIC PNL TOTAL CA: CPT | Performed by: INTERNAL MEDICINE

## 2021-12-23 PROCEDURE — 07978ZX DRAINAGE OF THORAX LYMPHATIC, VIA NATURAL OR ARTIFICIAL OPENING ENDOSCOPIC APPROACH, DIAGNOSTIC: ICD-10-PCS | Performed by: INTERNAL MEDICINE

## 2021-12-23 PROCEDURE — 88275 CYTOGENETICS 100-300: CPT

## 2021-12-23 PROCEDURE — 87102 FUNGUS ISOLATION CULTURE: CPT | Performed by: INTERNAL MEDICINE

## 2021-12-23 PROCEDURE — 87116 MYCOBACTERIA CULTURE: CPT | Performed by: INTERNAL MEDICINE

## 2021-12-23 PROCEDURE — A9577 INJ MULTIHANCE: HCPCS | Performed by: INTERNAL MEDICINE

## 2021-12-23 PROCEDURE — 88305 TISSUE EXAM BY PATHOLOGIST: CPT | Performed by: INTERNAL MEDICINE

## 2021-12-23 PROCEDURE — 82962 GLUCOSE BLOOD TEST: CPT

## 2021-12-23 PROCEDURE — 87206 SMEAR FLUORESCENT/ACID STAI: CPT | Performed by: INTERNAL MEDICINE

## 2021-12-23 PROCEDURE — 88108 CYTOPATH CONCENTRATE TECH: CPT | Performed by: INTERNAL MEDICINE

## 2021-12-23 PROCEDURE — 88381 MICRODISSECTION MANUAL: CPT

## 2021-12-23 PROCEDURE — 81235 EGFR GENE COM VARIANTS: CPT

## 2021-12-23 PROCEDURE — 88341 IMHCHEM/IMCYTCHM EA ADD ANTB: CPT | Performed by: INTERNAL MEDICINE

## 2021-12-23 PROCEDURE — C1726 CATH, BAL DIL, NON-VASCULAR: HCPCS | Performed by: INTERNAL MEDICINE

## 2021-12-23 PROCEDURE — 88173 CYTOPATH EVAL FNA REPORT: CPT | Performed by: INTERNAL MEDICINE

## 2021-12-23 PROCEDURE — 87071 CULTURE AEROBIC QUANT OTHER: CPT | Performed by: INTERNAL MEDICINE

## 2021-12-23 PROCEDURE — 0B9F8ZX DRAINAGE OF RIGHT LOWER LUNG LOBE, VIA NATURAL OR ARTIFICIAL OPENING ENDOSCOPIC, DIAGNOSTIC: ICD-10-PCS | Performed by: INTERNAL MEDICINE

## 2021-12-23 PROCEDURE — 88271 CYTOGENETICS DNA PROBE: CPT

## 2021-12-23 RX ORDER — TRAMADOL HYDROCHLORIDE 50 MG/1
50 TABLET ORAL EVERY 6 HOURS PRN
Status: DISCONTINUED | OUTPATIENT
Start: 2021-12-23 | End: 2021-12-26 | Stop reason: HOSPADM

## 2021-12-23 RX ORDER — IPRATROPIUM BROMIDE AND ALBUTEROL SULFATE 2.5; .5 MG/3ML; MG/3ML
3 SOLUTION RESPIRATORY (INHALATION) EVERY 4 HOURS PRN
Status: DISCONTINUED | OUTPATIENT
Start: 2021-12-23 | End: 2021-12-26 | Stop reason: HOSPADM

## 2021-12-23 RX ORDER — LIDOCAINE HYDROCHLORIDE 20 MG/ML
INJECTION, SOLUTION INFILTRATION; PERINEURAL AS NEEDED
Status: DISCONTINUED | OUTPATIENT
Start: 2021-12-23 | End: 2021-12-23 | Stop reason: SURG

## 2021-12-23 RX ORDER — NICOTINE POLACRILEX 4 MG
15 LOZENGE BUCCAL
Status: DISCONTINUED | OUTPATIENT
Start: 2021-12-23 | End: 2021-12-26 | Stop reason: HOSPADM

## 2021-12-23 RX ORDER — SODIUM CHLORIDE, SODIUM LACTATE, POTASSIUM CHLORIDE, CALCIUM CHLORIDE 600; 310; 30; 20 MG/100ML; MG/100ML; MG/100ML; MG/100ML
30 INJECTION, SOLUTION INTRAVENOUS CONTINUOUS
Status: DISCONTINUED | OUTPATIENT
Start: 2021-12-23 | End: 2021-12-23

## 2021-12-23 RX ORDER — PROPOFOL 10 MG/ML
VIAL (ML) INTRAVENOUS AS NEEDED
Status: DISCONTINUED | OUTPATIENT
Start: 2021-12-23 | End: 2021-12-23 | Stop reason: SURG

## 2021-12-23 RX ORDER — DEXTROSE MONOHYDRATE 100 MG/ML
25 INJECTION, SOLUTION INTRAVENOUS
Status: DISCONTINUED | OUTPATIENT
Start: 2021-12-23 | End: 2021-12-26 | Stop reason: HOSPADM

## 2021-12-23 RX ADMIN — GABAPENTIN 300 MG: 300 CAPSULE ORAL at 13:19

## 2021-12-23 RX ADMIN — DICLOFENAC SODIUM 2 G: 10 GEL TOPICAL at 10:42

## 2021-12-23 RX ADMIN — SODIUM CHLORIDE, PRESERVATIVE FREE 10 ML: 5 INJECTION INTRAVENOUS at 10:59

## 2021-12-23 RX ADMIN — INSULIN LISPRO 3 UNITS: 100 INJECTION, SOLUTION INTRAVENOUS; SUBCUTANEOUS at 13:19

## 2021-12-23 RX ADMIN — Medication 1 TABLET: at 10:42

## 2021-12-23 RX ADMIN — LORAZEPAM 0.5 MG: 0.5 TABLET ORAL at 21:10

## 2021-12-23 RX ADMIN — LISINOPRIL 2.5 MG: 2.5 TABLET ORAL at 10:41

## 2021-12-23 RX ADMIN — DICLOFENAC SODIUM 2 G: 10 GEL TOPICAL at 21:11

## 2021-12-23 RX ADMIN — GABAPENTIN 300 MG: 300 CAPSULE ORAL at 05:14

## 2021-12-23 RX ADMIN — METOPROLOL TARTRATE 50 MG: 50 TABLET, FILM COATED ORAL at 21:10

## 2021-12-23 RX ADMIN — TRAMADOL HYDROCHLORIDE 50 MG: 50 TABLET, COATED ORAL at 17:14

## 2021-12-23 RX ADMIN — GADOBENATE DIMEGLUMINE 14 ML: 529 INJECTION, SOLUTION INTRAVENOUS at 12:45

## 2021-12-23 RX ADMIN — METOPROLOL TARTRATE 50 MG: 50 TABLET, FILM COATED ORAL at 10:41

## 2021-12-23 RX ADMIN — GABAPENTIN 300 MG: 300 CAPSULE ORAL at 21:10

## 2021-12-23 RX ADMIN — ESTRADIOL 1 G: 0.1 CREAM VAGINAL at 21:12

## 2021-12-23 RX ADMIN — SODIUM CHLORIDE, POTASSIUM CHLORIDE, SODIUM LACTATE AND CALCIUM CHLORIDE 30 ML/HR: 600; 310; 30; 20 INJECTION, SOLUTION INTRAVENOUS at 09:07

## 2021-12-23 RX ADMIN — SODIUM CHLORIDE, PRESERVATIVE FREE 10 ML: 5 INJECTION INTRAVENOUS at 11:00

## 2021-12-23 RX ADMIN — PROPOFOL 100 MG: 10 INJECTION, EMULSION INTRAVENOUS at 09:17

## 2021-12-23 RX ADMIN — PANTOPRAZOLE SODIUM 40 MG: 40 TABLET, DELAYED RELEASE ORAL at 10:41

## 2021-12-23 RX ADMIN — SODIUM CHLORIDE, PRESERVATIVE FREE 10 ML: 5 INJECTION INTRAVENOUS at 21:11

## 2021-12-23 RX ADMIN — LORAZEPAM 0.5 MG: 0.5 TABLET ORAL at 10:41

## 2021-12-23 RX ADMIN — ACETAMINOPHEN 650 MG: 325 TABLET ORAL at 10:06

## 2021-12-23 RX ADMIN — THEOPHYLLINE 300 MG: 300 TABLET, EXTENDED RELEASE ORAL at 21:11

## 2021-12-23 RX ADMIN — ACETAMINOPHEN 650 MG: 325 TABLET ORAL at 21:10

## 2021-12-23 RX ADMIN — LIDOCAINE HYDROCHLORIDE 50 MG: 20 INJECTION, SOLUTION INFILTRATION; PERINEURAL at 09:17

## 2021-12-23 RX ADMIN — FOLIC ACID 1 MG: 1 TABLET ORAL at 10:42

## 2021-12-23 RX ADMIN — NYSTATIN: 100000 CREAM TOPICAL at 21:12

## 2021-12-23 RX ADMIN — THEOPHYLLINE 300 MG: 300 TABLET, EXTENDED RELEASE ORAL at 10:43

## 2021-12-23 RX ADMIN — MULTIPLE VITAMINS W/ MINERALS TAB 1 TABLET: TAB at 10:41

## 2021-12-23 RX ADMIN — IPRATROPIUM BROMIDE AND ALBUTEROL SULFATE 3 ML: .5; 2.5 SOLUTION RESPIRATORY (INHALATION) at 00:32

## 2021-12-23 RX ADMIN — INSULIN LISPRO 2 UNITS: 100 INJECTION, SOLUTION INTRAVENOUS; SUBCUTANEOUS at 17:14

## 2021-12-23 RX ADMIN — ROPINIROLE HYDROCHLORIDE 0.25 MG: 0.25 TABLET, FILM COATED ORAL at 21:10

## 2021-12-23 RX ADMIN — TRAMADOL HYDROCHLORIDE 50 MG: 50 TABLET, COATED ORAL at 04:45

## 2021-12-23 RX ADMIN — PROPOFOL 100 MCG/KG/MIN: 10 INJECTION, EMULSION INTRAVENOUS at 09:17

## 2021-12-23 RX ADMIN — ACETAMINOPHEN 650 MG: 325 TABLET ORAL at 04:45

## 2021-12-23 NOTE — ANESTHESIA POSTPROCEDURE EVALUATION
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          Post Anesthesia Care and Evaluation    Patient location during evaluation: bedside  Patient participation: complete - patient participated  Level of consciousness: awake  Pain management: adequate  Airway patency: patent  Anesthetic complications: No anesthetic complications  PONV Status: none  Cardiovascular status: acceptable and stable  Respiratory status: acceptable and room air  Hydration status: acceptable    Comments: An Anesthesiologist personally participated in the most demanding procedures (including induction and emergence if applicable) in the anesthesia plan, monitored the course of anesthesia administration at frequent intervals and remained physically present and available for immediate diagnosis and treatment of emergencies.

## 2021-12-23 NOTE — ANESTHESIA PREPROCEDURE EVALUATION
Anesthesia Evaluation     Patient summary reviewed and Nursing notes reviewed   NPO Solid Status: > 8 hours  NPO Liquid Status: > 8 hours           Airway   Mallampati: II  TM distance: >3 FB  Possible difficult intubation  Dental      Pulmonary - normal exam   (+) COPD, asthma,  Cardiovascular - normal exam  Exercise tolerance: poor (<4 METS)    (+) hypertension, hyperlipidemia,       Neuro/Psych  (+) TIA,     GI/Hepatic/Renal/Endo    (+)   renal disease CRI,     Musculoskeletal     Abdominal    Substance History      OB/GYN          Other                        Anesthesia Plan    ASA 4     general and MAC     intravenous induction     Anesthetic plan, all risks, benefits, and alternatives have been provided, discussed and informed consent has been obtained with: patient.

## 2021-12-24 LAB
ACB CMPLX DNA BAL NAA+NON-PRB-NCNCRNG: NOT DETECTED
BLACTX-M ISLT/SPM QL: ABNORMAL
BLAIMP ISLT/SPM QL: ABNORMAL
BLAKPC ISLT/SPM QL: ABNORMAL
BLAOXA-48-LIKE ISLT/SPM QL: ABNORMAL
BLAVIM ISLT/SPM QL: ABNORMAL
C PNEUM DNA NPH QL NAA+NON-PROBE: NOT DETECTED
E CLOAC COMP DNA BAL NAA+NON-PRB-NCNCRNG: NOT DETECTED
E COLI DNA BAL NAA+NON-PRB-NCNCRNG: NOT DETECTED
FLUAV SUBTYP SPEC NAA+PROBE: NOT DETECTED
FLUBV RNA ISLT QL NAA+PROBE: NOT DETECTED
GLUCOSE BLDC GLUCOMTR-MCNC: 160 MG/DL (ref 70–99)
GLUCOSE BLDC GLUCOMTR-MCNC: 168 MG/DL (ref 70–99)
GLUCOSE BLDC GLUCOMTR-MCNC: 178 MG/DL (ref 70–99)
GLUCOSE BLDC GLUCOMTR-MCNC: 180 MG/DL (ref 70–99)
GP B STREP DNA BAL NAA+NON-PRB-NCNCRNG: NOT DETECTED
HADV DNA SPEC NAA+PROBE: NOT DETECTED
HAEM INFLU DNA BAL NAA+NON-PRB-NCNCRNG: NOT DETECTED
HCOV RNA LOWER RESP QL NAA+NON-PROBE: NOT DETECTED
HMPV RNA NPH QL NAA+NON-PROBE: NOT DETECTED
HPIV RNA LOWER RESP QL NAA+NON-PROBE: NOT DETECTED
K AEROGENES DNA BAL NAA+NON-PRB-NCNCRNG: NOT DETECTED
K OXYTOCA DNA BAL NAA+NON-PRB-NCNCRNG: NOT DETECTED
K PNEU GRP DNA BAL NAA+NON-PRB-NCNCRNG: NOT DETECTED
L PNEUMO DNA LOWER RESP QL NAA+NON-PROBE: NOT DETECTED
M CATARRHALIS DNA BAL NAA+NON-PRB-NCNCRNG: NOT DETECTED
M PNEUMO IGG SER IA-ACNC: NOT DETECTED
MECA+MECC ISLT/SPM QL: ABNORMAL
NDM GENE: ABNORMAL
P AERUGINOSA DNA BAL NAA+NON-PRB-NCNCRNG: NOT DETECTED
PROTEUS SP DNA BAL NAA+NON-PRB-NCNCRNG: NOT DETECTED
RHINOVIRUS RNA SPEC NAA+PROBE: DETECTED
RSV RNA NPH QL NAA+NON-PROBE: NOT DETECTED
S AUREUS DNA BAL NAA+NON-PRB-NCNCRNG: NOT DETECTED
S MARCESCENS DNA BAL NAA+NON-PRB-NCNCRNG: NOT DETECTED
S PNEUM DNA BAL NAA+NON-PRB-NCNCRNG: NOT DETECTED
S PYO DNA BAL NAA+NON-PRB-NCNCRNG: NOT DETECTED

## 2021-12-24 PROCEDURE — 63710000001 INSULIN LISPRO (HUMAN) PER 5 UNITS: Performed by: INTERNAL MEDICINE

## 2021-12-24 PROCEDURE — 25010000002 ENOXAPARIN PER 10 MG: Performed by: INTERNAL MEDICINE

## 2021-12-24 PROCEDURE — 97110 THERAPEUTIC EXERCISES: CPT

## 2021-12-24 PROCEDURE — 97530 THERAPEUTIC ACTIVITIES: CPT

## 2021-12-24 PROCEDURE — 94799 UNLISTED PULMONARY SVC/PX: CPT

## 2021-12-24 PROCEDURE — 25010000002 AZITHROMYCIN PER 500 MG: Performed by: INTERNAL MEDICINE

## 2021-12-24 PROCEDURE — 99232 SBSQ HOSP IP/OBS MODERATE 35: CPT | Performed by: INTERNAL MEDICINE

## 2021-12-24 PROCEDURE — 82962 GLUCOSE BLOOD TEST: CPT

## 2021-12-24 RX ORDER — METOPROLOL TARTRATE 50 MG/1
100 TABLET, FILM COATED ORAL EVERY 12 HOURS SCHEDULED
Status: DISCONTINUED | OUTPATIENT
Start: 2021-12-24 | End: 2021-12-26 | Stop reason: HOSPADM

## 2021-12-24 RX ORDER — LISINOPRIL 5 MG/1
5 TABLET ORAL DAILY
Status: DISCONTINUED | OUTPATIENT
Start: 2021-12-25 | End: 2021-12-26 | Stop reason: HOSPADM

## 2021-12-24 RX ADMIN — SODIUM CHLORIDE, PRESERVATIVE FREE 10 ML: 5 INJECTION INTRAVENOUS at 20:38

## 2021-12-24 RX ADMIN — ESTRADIOL 1 G: 0.1 CREAM VAGINAL at 20:39

## 2021-12-24 RX ADMIN — Medication 1 TABLET: at 08:08

## 2021-12-24 RX ADMIN — MULTIPLE VITAMINS W/ MINERALS TAB 1 TABLET: TAB at 08:07

## 2021-12-24 RX ADMIN — LORAZEPAM 0.5 MG: 0.5 TABLET ORAL at 20:38

## 2021-12-24 RX ADMIN — THEOPHYLLINE 300 MG: 300 TABLET, EXTENDED RELEASE ORAL at 09:33

## 2021-12-24 RX ADMIN — INSULIN LISPRO 2 UNITS: 100 INJECTION, SOLUTION INTRAVENOUS; SUBCUTANEOUS at 08:08

## 2021-12-24 RX ADMIN — DICLOFENAC SODIUM 2 G: 10 GEL TOPICAL at 08:09

## 2021-12-24 RX ADMIN — DICLOFENAC SODIUM 2 G: 10 GEL TOPICAL at 20:39

## 2021-12-24 RX ADMIN — GABAPENTIN 300 MG: 300 CAPSULE ORAL at 06:38

## 2021-12-24 RX ADMIN — FUROSEMIDE 40 MG: 40 TABLET ORAL at 08:07

## 2021-12-24 RX ADMIN — LORAZEPAM 0.5 MG: 0.5 TABLET ORAL at 08:07

## 2021-12-24 RX ADMIN — ROPINIROLE HYDROCHLORIDE 0.25 MG: 0.25 TABLET, FILM COATED ORAL at 20:37

## 2021-12-24 RX ADMIN — TRAMADOL HYDROCHLORIDE 50 MG: 50 TABLET, COATED ORAL at 08:08

## 2021-12-24 RX ADMIN — FOLIC ACID 1 MG: 1 TABLET ORAL at 08:08

## 2021-12-24 RX ADMIN — LISINOPRIL 2.5 MG: 2.5 TABLET ORAL at 08:08

## 2021-12-24 RX ADMIN — GABAPENTIN 300 MG: 300 CAPSULE ORAL at 13:21

## 2021-12-24 RX ADMIN — ALUMINUM HYDROXIDE, MAGNESIUM HYDROXIDE, AND DIMETHICONE 15 ML: 400; 400; 40 SUSPENSION ORAL at 04:32

## 2021-12-24 RX ADMIN — INSULIN LISPRO 2 UNITS: 100 INJECTION, SOLUTION INTRAVENOUS; SUBCUTANEOUS at 17:16

## 2021-12-24 RX ADMIN — THEOPHYLLINE 300 MG: 300 TABLET, EXTENDED RELEASE ORAL at 20:37

## 2021-12-24 RX ADMIN — SODIUM CHLORIDE, PRESERVATIVE FREE 10 ML: 5 INJECTION INTRAVENOUS at 08:11

## 2021-12-24 RX ADMIN — INSULIN LISPRO 2 UNITS: 100 INJECTION, SOLUTION INTRAVENOUS; SUBCUTANEOUS at 12:18

## 2021-12-24 RX ADMIN — ENOXAPARIN SODIUM 30 MG: 100 INJECTION SUBCUTANEOUS at 08:08

## 2021-12-24 RX ADMIN — TRAMADOL HYDROCHLORIDE 50 MG: 50 TABLET, COATED ORAL at 20:37

## 2021-12-24 RX ADMIN — GABAPENTIN 300 MG: 300 CAPSULE ORAL at 21:01

## 2021-12-24 RX ADMIN — AZITHROMYCIN DIHYDRATE 500 MG: 500 INJECTION, POWDER, LYOPHILIZED, FOR SOLUTION INTRAVENOUS at 01:20

## 2021-12-24 RX ADMIN — PANTOPRAZOLE SODIUM 40 MG: 40 TABLET, DELAYED RELEASE ORAL at 06:38

## 2021-12-24 RX ADMIN — METOPROLOL TARTRATE 50 MG: 50 TABLET, FILM COATED ORAL at 08:07

## 2021-12-24 RX ADMIN — METOPROLOL TARTRATE 100 MG: 50 TABLET, FILM COATED ORAL at 20:37

## 2021-12-25 LAB
B-HCG UR QL: NEGATIVE
BACTERIA SPEC AEROBE CULT: NORMAL
GLUCOSE BLDC GLUCOMTR-MCNC: 165 MG/DL (ref 70–99)
GLUCOSE BLDC GLUCOMTR-MCNC: 177 MG/DL (ref 70–99)
GLUCOSE BLDC GLUCOMTR-MCNC: 182 MG/DL (ref 70–99)
GRAM STN SPEC: NORMAL

## 2021-12-25 PROCEDURE — 25010000002 ENOXAPARIN PER 10 MG: Performed by: INTERNAL MEDICINE

## 2021-12-25 PROCEDURE — 81025 URINE PREGNANCY TEST: CPT | Performed by: INTERNAL MEDICINE

## 2021-12-25 PROCEDURE — 63710000001 INSULIN LISPRO (HUMAN) PER 5 UNITS: Performed by: INTERNAL MEDICINE

## 2021-12-25 PROCEDURE — 82962 GLUCOSE BLOOD TEST: CPT

## 2021-12-25 PROCEDURE — 94799 UNLISTED PULMONARY SVC/PX: CPT

## 2021-12-25 RX ADMIN — DICLOFENAC SODIUM 2 G: 10 GEL TOPICAL at 21:28

## 2021-12-25 RX ADMIN — MULTIPLE VITAMINS W/ MINERALS TAB 1 TABLET: TAB at 08:45

## 2021-12-25 RX ADMIN — LORAZEPAM 0.5 MG: 0.5 TABLET ORAL at 21:13

## 2021-12-25 RX ADMIN — INSULIN LISPRO 2 UNITS: 100 INJECTION, SOLUTION INTRAVENOUS; SUBCUTANEOUS at 08:45

## 2021-12-25 RX ADMIN — ALUMINUM HYDROXIDE, MAGNESIUM HYDROXIDE, AND DIMETHICONE 15 ML: 400; 400; 40 SUSPENSION ORAL at 17:29

## 2021-12-25 RX ADMIN — GABAPENTIN 300 MG: 300 CAPSULE ORAL at 15:26

## 2021-12-25 RX ADMIN — SODIUM CHLORIDE, PRESERVATIVE FREE 10 ML: 5 INJECTION INTRAVENOUS at 21:29

## 2021-12-25 RX ADMIN — Medication 1 TABLET: at 08:44

## 2021-12-25 RX ADMIN — ROPINIROLE HYDROCHLORIDE 0.25 MG: 0.25 TABLET, FILM COATED ORAL at 21:20

## 2021-12-25 RX ADMIN — GABAPENTIN 300 MG: 300 CAPSULE ORAL at 21:13

## 2021-12-25 RX ADMIN — ESTRADIOL 1 G: 0.1 CREAM VAGINAL at 21:29

## 2021-12-25 RX ADMIN — PANTOPRAZOLE SODIUM 40 MG: 40 TABLET, DELAYED RELEASE ORAL at 06:10

## 2021-12-25 RX ADMIN — ENOXAPARIN SODIUM 30 MG: 100 INJECTION SUBCUTANEOUS at 08:46

## 2021-12-25 RX ADMIN — TRAMADOL HYDROCHLORIDE 50 MG: 50 TABLET, COATED ORAL at 21:13

## 2021-12-25 RX ADMIN — INSULIN LISPRO 2 UNITS: 100 INJECTION, SOLUTION INTRAVENOUS; SUBCUTANEOUS at 11:58

## 2021-12-25 RX ADMIN — GABAPENTIN 300 MG: 300 CAPSULE ORAL at 06:10

## 2021-12-25 RX ADMIN — FOLIC ACID 1 MG: 1 TABLET ORAL at 08:44

## 2021-12-25 RX ADMIN — LISINOPRIL 5 MG: 5 TABLET ORAL at 08:45

## 2021-12-25 RX ADMIN — METOPROLOL TARTRATE 100 MG: 50 TABLET, FILM COATED ORAL at 08:45

## 2021-12-25 RX ADMIN — THEOPHYLLINE 300 MG: 300 TABLET, EXTENDED RELEASE ORAL at 21:21

## 2021-12-25 RX ADMIN — LORAZEPAM 0.5 MG: 0.5 TABLET ORAL at 08:45

## 2021-12-25 RX ADMIN — DICLOFENAC SODIUM 2 G: 10 GEL TOPICAL at 08:47

## 2021-12-25 RX ADMIN — METOPROLOL TARTRATE 100 MG: 50 TABLET, FILM COATED ORAL at 21:13

## 2021-12-25 RX ADMIN — INSULIN LISPRO 2 UNITS: 100 INJECTION, SOLUTION INTRAVENOUS; SUBCUTANEOUS at 17:20

## 2021-12-25 RX ADMIN — SODIUM CHLORIDE, PRESERVATIVE FREE 10 ML: 5 INJECTION INTRAVENOUS at 08:45

## 2021-12-25 RX ADMIN — SODIUM CHLORIDE, PRESERVATIVE FREE 10 ML: 5 INJECTION INTRAVENOUS at 09:29

## 2021-12-25 RX ADMIN — THEOPHYLLINE 300 MG: 300 TABLET, EXTENDED RELEASE ORAL at 08:44

## 2021-12-25 RX ADMIN — ALUMINUM HYDROXIDE, MAGNESIUM HYDROXIDE, AND DIMETHICONE 15 ML: 400; 400; 40 SUSPENSION ORAL at 22:05

## 2021-12-26 ENCOUNTER — READMISSION MANAGEMENT (OUTPATIENT)
Dept: CALL CENTER | Facility: HOSPITAL | Age: 86
End: 2021-12-26

## 2021-12-26 VITALS
BODY MASS INDEX: 28.52 KG/M2 | OXYGEN SATURATION: 92 % | RESPIRATION RATE: 18 BRPM | TEMPERATURE: 97.6 F | DIASTOLIC BLOOD PRESSURE: 60 MMHG | SYSTOLIC BLOOD PRESSURE: 171 MMHG | HEIGHT: 63 IN | WEIGHT: 160.94 LBS | HEART RATE: 63 BPM

## 2021-12-26 LAB
027 TOXIN: NORMAL
C DIFF TOX GENS STL QL NAA+PROBE: NEGATIVE
GLUCOSE BLDC GLUCOMTR-MCNC: 179 MG/DL (ref 70–99)
GLUCOSE BLDC GLUCOMTR-MCNC: 213 MG/DL (ref 70–99)
Lab: ABNORMAL
METHYLMALONATE SERPL-SCNC: 398 NMOL/L (ref 0–378)

## 2021-12-26 PROCEDURE — 94799 UNLISTED PULMONARY SVC/PX: CPT

## 2021-12-26 PROCEDURE — 63710000001 INSULIN LISPRO (HUMAN) PER 5 UNITS: Performed by: INTERNAL MEDICINE

## 2021-12-26 PROCEDURE — 82962 GLUCOSE BLOOD TEST: CPT

## 2021-12-26 PROCEDURE — 25010000002 ENOXAPARIN PER 10 MG: Performed by: INTERNAL MEDICINE

## 2021-12-26 PROCEDURE — 87493 C DIFF AMPLIFIED PROBE: CPT | Performed by: INTERNAL MEDICINE

## 2021-12-26 RX ORDER — NYSTATIN 100000 U/G
1 CREAM TOPICAL 2 TIMES DAILY
Qty: 60 G | Refills: 0 | Status: SHIPPED | OUTPATIENT
Start: 2021-12-26 | End: 2022-01-25

## 2021-12-26 RX ADMIN — FOLIC ACID 1 MG: 1 TABLET ORAL at 09:27

## 2021-12-26 RX ADMIN — GABAPENTIN 300 MG: 300 CAPSULE ORAL at 13:53

## 2021-12-26 RX ADMIN — INSULIN LISPRO 2 UNITS: 100 INJECTION, SOLUTION INTRAVENOUS; SUBCUTANEOUS at 08:33

## 2021-12-26 RX ADMIN — DICLOFENAC SODIUM 2 G: 10 GEL TOPICAL at 09:28

## 2021-12-26 RX ADMIN — METOPROLOL TARTRATE 50 MG: 50 TABLET, FILM COATED ORAL at 09:28

## 2021-12-26 RX ADMIN — INSULIN LISPRO 3 UNITS: 100 INJECTION, SOLUTION INTRAVENOUS; SUBCUTANEOUS at 12:15

## 2021-12-26 RX ADMIN — THEOPHYLLINE 300 MG: 300 TABLET, EXTENDED RELEASE ORAL at 09:27

## 2021-12-26 RX ADMIN — ENOXAPARIN SODIUM 30 MG: 100 INJECTION SUBCUTANEOUS at 09:28

## 2021-12-26 RX ADMIN — SODIUM CHLORIDE, PRESERVATIVE FREE 10 ML: 5 INJECTION INTRAVENOUS at 09:31

## 2021-12-26 RX ADMIN — MULTIPLE VITAMINS W/ MINERALS TAB 1 TABLET: TAB at 09:27

## 2021-12-26 RX ADMIN — Medication 1 TABLET: at 09:27

## 2021-12-26 RX ADMIN — LORAZEPAM 0.5 MG: 0.5 TABLET ORAL at 09:28

## 2021-12-26 RX ADMIN — GABAPENTIN 300 MG: 300 CAPSULE ORAL at 06:16

## 2021-12-26 RX ADMIN — ACETAMINOPHEN 650 MG: 325 TABLET ORAL at 15:20

## 2021-12-26 RX ADMIN — PANTOPRAZOLE SODIUM 40 MG: 40 TABLET, DELAYED RELEASE ORAL at 06:16

## 2021-12-26 RX ADMIN — LISINOPRIL 5 MG: 5 TABLET ORAL at 09:28

## 2021-12-26 NOTE — OUTREACH NOTE
Prep Survey      Responses   [facility]? [facility]   Is LACE score < 7 ? No   Emergency Room discharge w/ pulse ox? No   Eligibility TCM   [facility]   Date of Admission 12/15/21   Date of Discharge 12/26/21   Discharge Disposition Home-Health Care [facility]   Discharge diagnosis cellulitis & abscess of foot, PNA, milar mass   Does the patient have one of the following disease processes/diagnoses(primary or secondary)? COPD/Pneumonia   Does the patient have Home health ordered? Yes   What is the Home health agency?  [facility]   Is there a DME ordered? Yes   What DME was ordered? O2 from [employer]   Prep survey completed? Yes          Cristal Bernardo RN

## 2021-12-27 ENCOUNTER — TRANSITIONAL CARE MANAGEMENT TELEPHONE ENCOUNTER (OUTPATIENT)
Dept: CALL CENTER | Facility: HOSPITAL | Age: 86
End: 2021-12-27

## 2021-12-27 NOTE — OUTREACH NOTE
Call Center TCM Note      Responses   [facility] discharged from? [facility]   Does the patient have one of the following disease processes/diagnoses(primary or secondary)? COPD/Pneumonia   Was the primary reason for admission: Pneumonia   TCM attempt successful? Yes   Call start time 1236   Call end time 1258   Discharge diagnosis cellulitis & abscess of foot, PNA, milar mass   Is patient permission given to speak with other caregiver? Yes   List who call center can speak with Lizbeth - Daughter   Person spoke with today (if not patient) and relationship Lizbeth and Pt   Meds reviewed with patient/caregiver? N/A   Does the patient have all medications ordered at discharge? N/A   Is the patient taking all medications as directed (includes completed medication regime)? Yes   Does the patient have a primary care provider?  Yes   Does the patient have an appointment with their PCP or specialist within 7 days of discharge? Yes   Comments regarding PCP Hosp DC FU appt 1/3/22 @ 2:15pm.   Has the patient kept scheduled appointments due by today? N/A   What is the Home health agency?  [facility]    Has home health visited the patient within 72 hours of discharge? Unsure   What DME was ordered? O2 from [employer]   Has all DME been delivered? Yes   Pulse Ox monitoring Intermittent   Pulse Ox device source Patient   O2 Sat comments O2 drops to 85% with O2 on with activity.  At rest 96% O2 2L    O2 Sat: education provided Sat levels,  Monitoring frequency,  When to seek care   Psychosocial issues? No   Did the patient receive a copy of their discharge instructions? Yes   Nursing interventions Reviewed instructions with patient   What is the patient's perception of their health status since discharge? Improving   Nursing Interventions Nurse provided patient education   Is the patient/caregiver able to teach back the hierarchy of who to call/visit for symptoms/problems? PCP, Specialist, Home health nurse, Urgent Care, ED, 911  Yes   Is the patient/caregiver able to teach back signs and symptoms of worsening condition: Fever/chills,  Shortness of breath,  Chest pain   TCM call completed? Yes   Wrap up additional comments Daughter Irina is in the hospital . May speak with Lizbeth as Pt is staying with her.           Tory Bhandari RN    12/27/2021, 13:01 EST

## 2021-12-28 ENCOUNTER — TELEPHONE (OUTPATIENT)
Dept: PULMONOLOGY | Facility: CLINIC | Age: 86
End: 2021-12-28

## 2021-12-28 NOTE — TELEPHONE ENCOUNTER
PT's daughter Lizbeth is calling to see if the results of the biopsy have came in yet.She can be reached at [phone]

## 2022-01-01 ENCOUNTER — HOSPITAL ENCOUNTER (OUTPATIENT)
Dept: GENERAL RADIOLOGY | Facility: HOSPITAL | Age: 87
Discharge: HOME | End: 2022-04-08

## 2022-01-01 ENCOUNTER — PROCEDURE VISIT (OUTPATIENT)
Dept: UROLOGY | Facility: CLINIC | Age: 87
End: 2022-01-01

## 2022-01-01 ENCOUNTER — OFFICE VISIT (OUTPATIENT)
Dept: PULMONOLOGY | Facility: CLINIC | Age: 87
End: 2022-01-01

## 2022-01-01 ENCOUNTER — TELEPHONE (OUTPATIENT)
Dept: PULMONOLOGY | Facility: CLINIC | Age: 87
End: 2022-01-01

## 2022-01-01 ENCOUNTER — HOSPITAL ENCOUNTER (OUTPATIENT)
Dept: CT IMAGING | Facility: HOSPITAL | Age: 87
Discharge: HOME | End: 2022-05-04
Admitting: NURSE PRACTITIONER

## 2022-01-01 ENCOUNTER — TRANSCRIBE ORDERS (OUTPATIENT)
Dept: LAB | Facility: HOSPITAL | Age: 87
End: 2022-01-01

## 2022-01-01 ENCOUNTER — LAB (OUTPATIENT)
Dept: LAB | Facility: HOSPITAL | Age: 87
End: 2022-01-01

## 2022-01-01 ENCOUNTER — OFFICE VISIT (OUTPATIENT)
Dept: UROLOGY | Facility: CLINIC | Age: 87
End: 2022-01-01

## 2022-01-01 ENCOUNTER — TELEPHONE (OUTPATIENT)
Dept: UROLOGY | Facility: CLINIC | Age: 87
End: 2022-01-01

## 2022-01-01 ENCOUNTER — HOSPITAL ENCOUNTER (OUTPATIENT)
Dept: MAMMOGRAPHY | Facility: HOSPITAL | Age: 87
Discharge: HOME | End: 2022-04-08

## 2022-01-01 ENCOUNTER — TRANSCRIBE ORDERS (OUTPATIENT)
Dept: ADMINISTRATIVE | Facility: HOSPITAL | Age: 87
End: 2022-01-01

## 2022-01-01 ENCOUNTER — CLINICAL SUPPORT (OUTPATIENT)
Dept: UROLOGY | Facility: CLINIC | Age: 87
End: 2022-01-01

## 2022-01-01 ENCOUNTER — HOSPITAL ENCOUNTER (OUTPATIENT)
Dept: GENERAL RADIOLOGY | Facility: HOSPITAL | Age: 87
Discharge: HOME | End: 2022-10-10
Admitting: NURSE PRACTITIONER

## 2022-01-01 VITALS
TEMPERATURE: 98.2 F | SYSTOLIC BLOOD PRESSURE: 103 MMHG | BODY MASS INDEX: 25.64 KG/M2 | WEIGHT: 150.2 LBS | RESPIRATION RATE: 18 BRPM | OXYGEN SATURATION: 94 % | DIASTOLIC BLOOD PRESSURE: 90 MMHG | HEART RATE: 78 BPM | HEIGHT: 64 IN

## 2022-01-01 VITALS
TEMPERATURE: 98 F | BODY MASS INDEX: 26.07 KG/M2 | WEIGHT: 152.7 LBS | RESPIRATION RATE: 20 BRPM | HEIGHT: 64 IN | HEART RATE: 68 BPM | OXYGEN SATURATION: 99 % | DIASTOLIC BLOOD PRESSURE: 48 MMHG | SYSTOLIC BLOOD PRESSURE: 132 MMHG

## 2022-01-01 VITALS
TEMPERATURE: 98.4 F | RESPIRATION RATE: 16 BRPM | SYSTOLIC BLOOD PRESSURE: 111 MMHG | WEIGHT: 151 LBS | DIASTOLIC BLOOD PRESSURE: 46 MMHG | BODY MASS INDEX: 25.78 KG/M2 | HEIGHT: 64 IN | OXYGEN SATURATION: 99 % | HEART RATE: 66 BPM

## 2022-01-01 VITALS
HEART RATE: 80 BPM | DIASTOLIC BLOOD PRESSURE: 54 MMHG | SYSTOLIC BLOOD PRESSURE: 152 MMHG | BODY MASS INDEX: 25.95 KG/M2 | WEIGHT: 152 LBS | TEMPERATURE: 98.7 F | HEIGHT: 64 IN

## 2022-01-01 VITALS
HEIGHT: 64 IN | SYSTOLIC BLOOD PRESSURE: 131 MMHG | BODY MASS INDEX: 25.95 KG/M2 | OXYGEN SATURATION: 96 % | RESPIRATION RATE: 18 BRPM | DIASTOLIC BLOOD PRESSURE: 47 MMHG | TEMPERATURE: 98.2 F | WEIGHT: 152 LBS | HEART RATE: 58 BPM

## 2022-01-01 VITALS
HEIGHT: 64 IN | BODY MASS INDEX: 26.8 KG/M2 | WEIGHT: 157 LBS | RESPIRATION RATE: 16 BRPM | TEMPERATURE: 97.4 F | DIASTOLIC BLOOD PRESSURE: 105 MMHG | HEART RATE: 61 BPM | OXYGEN SATURATION: 100 % | SYSTOLIC BLOOD PRESSURE: 122 MMHG

## 2022-01-01 VITALS
DIASTOLIC BLOOD PRESSURE: 74 MMHG | TEMPERATURE: 98.4 F | BODY MASS INDEX: 26.8 KG/M2 | WEIGHT: 157 LBS | HEART RATE: 79 BPM | HEIGHT: 64 IN | SYSTOLIC BLOOD PRESSURE: 129 MMHG

## 2022-01-01 DIAGNOSIS — N39.0 RECURRENT UTI: Primary | ICD-10-CM

## 2022-01-01 DIAGNOSIS — C34.91 ADENOCARCINOMA OF RIGHT LUNG: ICD-10-CM

## 2022-01-01 DIAGNOSIS — N18.9 CHRONIC KIDNEY DISEASE, UNSPECIFIED CKD STAGE: ICD-10-CM

## 2022-01-01 DIAGNOSIS — R06.09 DYSPNEA ON EXERTION: ICD-10-CM

## 2022-01-01 DIAGNOSIS — R91.8 HILAR MASS: ICD-10-CM

## 2022-01-01 DIAGNOSIS — N30.90 CYSTITIS WITHOUT HEMATURIA: ICD-10-CM

## 2022-01-01 DIAGNOSIS — J44.9 CHRONIC OBSTRUCTIVE PULMONARY DISEASE, UNSPECIFIED COPD TYPE: ICD-10-CM

## 2022-01-01 DIAGNOSIS — N95.2 POST-MENOPAUSAL ATROPHIC VAGINITIS: ICD-10-CM

## 2022-01-01 DIAGNOSIS — N95.2 POST-MENOPAUSAL ATROPHIC VAGINITIS: Primary | ICD-10-CM

## 2022-01-01 DIAGNOSIS — R30.9 PAIN EMPTYING BLADDER: ICD-10-CM

## 2022-01-01 DIAGNOSIS — N39.0 RECURRENT UTI: ICD-10-CM

## 2022-01-01 DIAGNOSIS — N18.30 STAGE 3 CHRONIC KIDNEY DISEASE, UNSPECIFIED WHETHER STAGE 3A OR 3B CKD: ICD-10-CM

## 2022-01-01 DIAGNOSIS — J44.9 CHRONIC OBSTRUCTIVE PULMONARY DISEASE, UNSPECIFIED COPD TYPE: Primary | ICD-10-CM

## 2022-01-01 DIAGNOSIS — R30.9 PAIN WITH URINATION: ICD-10-CM

## 2022-01-01 DIAGNOSIS — M54.41 CHRONIC LEFT-SIDED LOW BACK PAIN WITH BILATERAL SCIATICA: ICD-10-CM

## 2022-01-01 DIAGNOSIS — E55.9 VITAMIN D DEFICIENCY, UNSPECIFIED: ICD-10-CM

## 2022-01-01 DIAGNOSIS — N39.0 CHRONIC UTI: Primary | ICD-10-CM

## 2022-01-01 DIAGNOSIS — C34.91 ADENOCARCINOMA, LUNG, RIGHT: ICD-10-CM

## 2022-01-01 DIAGNOSIS — N39.0 RECURRENT UTI: Chronic | ICD-10-CM

## 2022-01-01 DIAGNOSIS — R91.8 LUNG INFILTRATE: ICD-10-CM

## 2022-01-01 DIAGNOSIS — B96.89 AV (ANAEROBIC VAGINOSIS): ICD-10-CM

## 2022-01-01 DIAGNOSIS — N76.0 AV (ANAEROBIC VAGINOSIS): ICD-10-CM

## 2022-01-01 DIAGNOSIS — C34.91 ADENOCARCINOMA OF RIGHT LUNG: Primary | ICD-10-CM

## 2022-01-01 DIAGNOSIS — Z87.440 HISTORY OF RECURRENT UTI (URINARY TRACT INFECTION): Primary | ICD-10-CM

## 2022-01-01 DIAGNOSIS — N39.43 POST-VOID DRIBBLING: ICD-10-CM

## 2022-01-01 DIAGNOSIS — L03.116 CELLULITIS OF LEFT THIGH: ICD-10-CM

## 2022-01-01 DIAGNOSIS — R73.9 HYPERGLYCEMIA: ICD-10-CM

## 2022-01-01 DIAGNOSIS — N30.00 ACUTE CYSTITIS WITHOUT HEMATURIA: ICD-10-CM

## 2022-01-01 DIAGNOSIS — N18.31 STAGE 3A CHRONIC KIDNEY DISEASE: Primary | ICD-10-CM

## 2022-01-01 DIAGNOSIS — N18.4 CKD (CHRONIC KIDNEY DISEASE) STAGE 4, GFR 15-29 ML/MIN: ICD-10-CM

## 2022-01-01 DIAGNOSIS — M48.00 SPINAL STENOSIS, UNSPECIFIED SPINAL REGION: ICD-10-CM

## 2022-01-01 DIAGNOSIS — N39.0 CHRONIC UTI: ICD-10-CM

## 2022-01-01 DIAGNOSIS — R30.9 PAIN WITH URINATION: Primary | ICD-10-CM

## 2022-01-01 DIAGNOSIS — R30.9 PAIN EMPTYING BLADDER: Primary | ICD-10-CM

## 2022-01-01 DIAGNOSIS — R05.9 COUGH, UNSPECIFIED TYPE: ICD-10-CM

## 2022-01-01 DIAGNOSIS — Z12.31 VISIT FOR SCREENING MAMMOGRAM: ICD-10-CM

## 2022-01-01 DIAGNOSIS — N18.31 STAGE 3A CHRONIC KIDNEY DISEASE: ICD-10-CM

## 2022-01-01 DIAGNOSIS — G89.29 CHRONIC LEFT-SIDED LOW BACK PAIN WITH BILATERAL SCIATICA: ICD-10-CM

## 2022-01-01 DIAGNOSIS — N18.30 STAGE 3 CHRONIC KIDNEY DISEASE, UNSPECIFIED WHETHER STAGE 3A OR 3B CKD: Primary | ICD-10-CM

## 2022-01-01 DIAGNOSIS — M54.42 CHRONIC LEFT-SIDED LOW BACK PAIN WITH BILATERAL SCIATICA: ICD-10-CM

## 2022-01-01 DIAGNOSIS — R06.2 WHEEZE: ICD-10-CM

## 2022-01-01 DIAGNOSIS — N76.0 VAGINOSIS: ICD-10-CM

## 2022-01-01 DIAGNOSIS — N35.919 URETHRAL SPASM: ICD-10-CM

## 2022-01-01 DIAGNOSIS — B37.2 CANDIDAL SKIN INFECTION: Primary | ICD-10-CM

## 2022-01-01 DIAGNOSIS — S70.362D INSECT BITE OF LEFT THIGH, SUBSEQUENT ENCOUNTER: ICD-10-CM

## 2022-01-01 DIAGNOSIS — R93.89 ABNORMAL CHEST CT: Primary | ICD-10-CM
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
## 2022-01-01 DIAGNOSIS — C34.91 ADENOCARCINOMA, LUNG, RIGHT: Primary | ICD-10-CM

## 2022-01-01 DIAGNOSIS — N39.41 URGE INCONTINENCE OF URINE: ICD-10-CM

## 2022-01-01 DIAGNOSIS — R93.89 ABNORMAL CHEST CT: ICD-10-CM

## 2022-01-01 LAB
25(OH)D3 SERPL-MCNC: 21.3 NG/ML (ref 30–100)
25(OH)D3 SERPL-MCNC: 25.4 NG/ML (ref 30–100)
ALBUMIN SERPL-MCNC: 3.8 G/DL (ref 3.5–5.2)
ALBUMIN SERPL-MCNC: 4.1 G/DL (ref 3.5–5.2)
ALBUMIN SERPL-MCNC: 4.7 G/DL (ref 3.5–5.2)
ALBUMIN/GLOB SERPL: 1.6 G/DL
ALP SERPL-CCNC: 75 U/L (ref 39–117)
ALT SERPL W P-5'-P-CCNC: 8 U/L (ref 1–33)
ANION GAP SERPL CALCULATED.3IONS-SCNC: 11.9 MMOL/L (ref 5–15)
ANION GAP SERPL CALCULATED.3IONS-SCNC: 13.4 MMOL/L (ref 5–15)
ANION GAP SERPL CALCULATED.3IONS-SCNC: 9 MMOL/L (ref 5–15)
AST SERPL-CCNC: 17 U/L (ref 1–32)
BACTERIA SPEC AEROBE CULT: ABNORMAL
BACTERIA SPEC AEROBE CULT: NO GROWTH
BACTERIA UR QL AUTO: ABNORMAL /HPF
BACTERIA UR QL AUTO: ABNORMAL /HPF
BASOPHILS # BLD AUTO: 0.05 10*3/MM3 (ref 0–0.2)
BASOPHILS # BLD AUTO: 0.05 10*3/MM3 (ref 0–0.2)
BASOPHILS NFR BLD AUTO: 0.9 % (ref 0–1.5)
BASOPHILS NFR BLD AUTO: 1.1 % (ref 0–1.5)
BILIRUB BLD-MCNC: NEGATIVE MG/DL
BILIRUB SERPL-MCNC: 0.3 MG/DL (ref 0–1.2)
BILIRUB UR QL STRIP: NEGATIVE
BILIRUB UR QL STRIP: NEGATIVE
BUN SERPL-MCNC: 31 MG/DL (ref 8–23)
BUN SERPL-MCNC: 39 MG/DL (ref 8–23)
BUN SERPL-MCNC: 48 MG/DL (ref 8–23)
BUN/CREAT SERPL: 23 (ref 7–25)
BUN/CREAT SERPL: 29.8 (ref 7–25)
BUN/CREAT SERPL: 30.7 (ref 7–25)
CALCIUM SPEC-SCNC: 10 MG/DL (ref 8.2–9.6)
CALCIUM SPEC-SCNC: 10.5 MG/DL (ref 8.2–9.6)
CALCIUM SPEC-SCNC: 10.6 MG/DL (ref 8.2–9.6)
CHLORIDE SERPL-SCNC: 101 MMOL/L (ref 98–107)
CHLORIDE SERPL-SCNC: 101 MMOL/L (ref 98–107)
CHLORIDE SERPL-SCNC: 103 MMOL/L (ref 98–107)
CLARITY UR: ABNORMAL
CLARITY UR: CLEAR
CLARITY, POC: ABNORMAL
CLARITY, POC: CLEAR
CO2 SERPL-SCNC: 24.1 MMOL/L (ref 22–29)
CO2 SERPL-SCNC: 26.6 MMOL/L (ref 22–29)
CO2 SERPL-SCNC: 27 MMOL/L (ref 22–29)
COLOR UR: YELLOW
CREAT SERPL-MCNC: 1.27 MG/DL (ref 0.57–1)
CREAT SERPL-MCNC: 1.35 MG/DL (ref 0.57–1)
CREAT SERPL-MCNC: 1.61 MG/DL (ref 0.57–1)
CREAT UR-MCNC: 51.2 MG/DL
CREAT UR-MCNC: 77.3 MG/DL
DEPRECATED RDW RBC AUTO: 43.7 FL (ref 37–54)
DEPRECATED RDW RBC AUTO: 46.5 FL (ref 37–54)
EGFRCR SERPLBLD CKD-EPI 2021: 29.9 ML/MIN/1.73
EGFRCR SERPLBLD CKD-EPI 2021: 36.9 ML/MIN/1.73
EGFRCR SERPLBLD CKD-EPI 2021: 40 ML/MIN/1.73
EOSINOPHIL # BLD AUTO: 0.19 10*3/MM3 (ref 0–0.4)
EOSINOPHIL # BLD AUTO: 0.25 10*3/MM3 (ref 0–0.4)
EOSINOPHIL NFR BLD AUTO: 3.6 % (ref 0.3–6.2)
EOSINOPHIL NFR BLD AUTO: 5.3 % (ref 0.3–6.2)
ERYTHROCYTE [DISTWIDTH] IN BLOOD BY AUTOMATED COUNT: 13.5 % (ref 12.3–15.4)
ERYTHROCYTE [DISTWIDTH] IN BLOOD BY AUTOMATED COUNT: 13.6 % (ref 12.3–15.4)
EXPIRATION DATE: ABNORMAL
EXPIRATION DATE: NORMAL
GLOBULIN UR ELPH-MCNC: 3 GM/DL
GLUCOSE SERPL-MCNC: 114 MG/DL (ref 65–99)
GLUCOSE SERPL-MCNC: 168 MG/DL (ref 65–99)
GLUCOSE SERPL-MCNC: 53 MG/DL (ref 65–99)
GLUCOSE UR STRIP-MCNC: ABNORMAL MG/DL
GLUCOSE UR STRIP-MCNC: NEGATIVE MG/DL
HCT VFR BLD AUTO: 35.7 % (ref 34–46.6)
HCT VFR BLD AUTO: 38 % (ref 34–46.6)
HGB BLD-MCNC: 11.4 G/DL (ref 12–15.9)
HGB BLD-MCNC: 12.2 G/DL (ref 12–15.9)
HGB UR QL STRIP.AUTO: ABNORMAL
HGB UR QL STRIP.AUTO: NEGATIVE
HYALINE CASTS UR QL AUTO: ABNORMAL /LPF
HYALINE CASTS UR QL AUTO: ABNORMAL /LPF
IMM GRANULOCYTES # BLD AUTO: 0.02 10*3/MM3 (ref 0–0.05)
IMM GRANULOCYTES # BLD AUTO: 0.02 10*3/MM3 (ref 0–0.05)
IMM GRANULOCYTES NFR BLD AUTO: 0.4 % (ref 0–0.5)
IMM GRANULOCYTES NFR BLD AUTO: 0.4 % (ref 0–0.5)
KETONES UR QL STRIP: NEGATIVE
KETONES UR QL STRIP: NEGATIVE
KETONES UR QL: NEGATIVE
LEUKOCYTE EST, POC: ABNORMAL
LEUKOCYTE EST, POC: NEGATIVE
LEUKOCYTE ESTERASE UR QL STRIP.AUTO: ABNORMAL
LEUKOCYTE ESTERASE UR QL STRIP.AUTO: NEGATIVE
LYMPHOCYTES # BLD AUTO: 0.91 10*3/MM3 (ref 0.7–3.1)
LYMPHOCYTES # BLD AUTO: 1.43 10*3/MM3 (ref 0.7–3.1)
LYMPHOCYTES NFR BLD AUTO: 17.1 % (ref 19.6–45.3)
LYMPHOCYTES NFR BLD AUTO: 30.4 % (ref 19.6–45.3)
Lab: ABNORMAL
Lab: NORMAL
MCH RBC QN AUTO: 27.9 PG (ref 26.6–33)
MCH RBC QN AUTO: 29.8 PG (ref 26.6–33)
MCHC RBC AUTO-ENTMCNC: 31.9 G/DL (ref 31.5–35.7)
MCHC RBC AUTO-ENTMCNC: 32.1 G/DL (ref 31.5–35.7)
MCV RBC AUTO: 87.5 FL (ref 79–97)
MCV RBC AUTO: 92.7 FL (ref 79–97)
MONOCYTES # BLD AUTO: 0.45 10*3/MM3 (ref 0.1–0.9)
MONOCYTES # BLD AUTO: 0.51 10*3/MM3 (ref 0.1–0.9)
MONOCYTES NFR BLD AUTO: 10.8 % (ref 5–12)
MONOCYTES NFR BLD AUTO: 8.4 % (ref 5–12)
NEUTROPHILS NFR BLD AUTO: 2.45 10*3/MM3 (ref 1.7–7)
NEUTROPHILS NFR BLD AUTO: 3.71 10*3/MM3 (ref 1.7–7)
NEUTROPHILS NFR BLD AUTO: 52 % (ref 42.7–76)
NEUTROPHILS NFR BLD AUTO: 69.6 % (ref 42.7–76)
NITRITE UR QL STRIP: NEGATIVE
NITRITE UR QL STRIP: NEGATIVE
NITRITE UR-MCNC: NEGATIVE MG/ML
NITRITE UR-MCNC: POSITIVE MG/ML
NRBC BLD AUTO-RTO: 0 /100 WBC (ref 0–0.2)
NRBC BLD AUTO-RTO: 0 /100 WBC (ref 0–0.2)
PH UR STRIP.AUTO: 6 [PH] (ref 5–8)
PH UR STRIP.AUTO: 6 [PH] (ref 5–8)
PH UR: 5 [PH] (ref 5–8)
PH UR: 5.5 [PH] (ref 5–8)
PH UR: 6 [PH] (ref 5–8)
PH UR: 6 [PH] (ref 5–8)
PHOSPHATE SERPL-MCNC: 2.7 MG/DL (ref 2.5–4.5)
PHOSPHATE SERPL-MCNC: 3.3 MG/DL (ref 2.5–4.5)
PLATELET # BLD AUTO: 208 10*3/MM3 (ref 140–450)
PLATELET # BLD AUTO: 214 10*3/MM3 (ref 140–450)
PMV BLD AUTO: 8.7 FL (ref 6–12)
PMV BLD AUTO: 8.8 FL (ref 6–12)
POTASSIUM SERPL-SCNC: 4 MMOL/L (ref 3.5–5.2)
POTASSIUM SERPL-SCNC: 4.4 MMOL/L (ref 3.5–5.2)
POTASSIUM SERPL-SCNC: 4.5 MMOL/L (ref 3.5–5.2)
PROT ?TM UR-MCNC: 30.4 MG/DL
PROT ?TM UR-MCNC: 37.7 MG/DL
PROT ?TM UR-MCNC: 39.4 MG/DL
PROT SERPL-MCNC: 7.7 G/DL (ref 6–8.5)
PROT UR QL STRIP: ABNORMAL
PROT UR QL STRIP: ABNORMAL
PROT UR STRIP-MCNC: ABNORMAL MG/DL
PROT UR STRIP-MCNC: NEGATIVE MG/DL
PROT UR STRIP-MCNC: NEGATIVE MG/DL
PROT/CREAT UR: 0.49 MG/G{CREAT}
PROT/CREAT UR: 0.77 MG/G{CREAT}
PTH-INTACT SERPL-MCNC: 79.1 PG/ML (ref 15–65)
PTH-INTACT SERPL-MCNC: 97.5 PG/ML (ref 15–65)
RBC # BLD AUTO: 4.08 10*6/MM3 (ref 3.77–5.28)
RBC # BLD AUTO: 4.1 10*6/MM3 (ref 3.77–5.28)
RBC # UR STRIP: ABNORMAL /HPF
RBC # UR STRIP: ABNORMAL /HPF
RBC # UR STRIP: ABNORMAL /UL
RBC # UR STRIP: NEGATIVE /UL
REF LAB TEST METHOD: ABNORMAL
REF LAB TEST METHOD: ABNORMAL
SODIUM SERPL-SCNC: 137 MMOL/L (ref 136–145)
SODIUM SERPL-SCNC: 139 MMOL/L (ref 136–145)
SODIUM SERPL-SCNC: 141 MMOL/L (ref 136–145)
SP GR UR STRIP: 1.01 (ref 1–1.03)
SP GR UR STRIP: 1.02 (ref 1–1.03)
SP GR UR: 1.01 (ref 1–1.03)
SP GR UR: 1.02 (ref 1–1.03)
SP GR UR: 1.02 (ref 1–1.03)
SQUAMOUS #/AREA URNS HPF: ABNORMAL /HPF
SQUAMOUS #/AREA URNS HPF: ABNORMAL /HPF
UROBILINOGEN UR QL STRIP: ABNORMAL
UROBILINOGEN UR QL STRIP: ABNORMAL
UROBILINOGEN UR QL: NORMAL
WBC # UR STRIP: ABNORMAL /HPF
WBC # UR STRIP: ABNORMAL /HPF
WBC NRBC COR # BLD: 4.71 10*3/MM3 (ref 3.4–10.8)
WBC NRBC COR # BLD: 5.33 10*3/MM3 (ref 3.4–10.8)

## 2022-01-01 PROCEDURE — 51700 IRRIGATION OF BLADDER: CPT | Performed by: UROLOGY

## 2022-01-01 PROCEDURE — 51700 IRRIGATION OF BLADDER: CPT | Performed by: NURSE PRACTITIONER

## 2022-01-01 PROCEDURE — 87086 URINE CULTURE/COLONY COUNT: CPT | Performed by: NURSE PRACTITIONER

## 2022-01-01 PROCEDURE — 71250 CT THORAX DX C-: CPT

## 2022-01-01 PROCEDURE — 85025 COMPLETE CBC W/AUTO DIFF WBC: CPT

## 2022-01-01 PROCEDURE — 77063 BREAST TOMOSYNTHESIS BI: CPT

## 2022-01-01 PROCEDURE — 99213 OFFICE O/P EST LOW 20 MIN: CPT | Performed by: NURSE PRACTITIONER

## 2022-01-01 PROCEDURE — 87086 URINE CULTURE/COLONY COUNT: CPT | Performed by: UROLOGY

## 2022-01-01 PROCEDURE — 87186 SC STD MICRODIL/AGAR DIL: CPT | Performed by: NURSE PRACTITIONER

## 2022-01-01 PROCEDURE — 71046 X-RAY EXAM CHEST 2 VIEWS: CPT

## 2022-01-01 PROCEDURE — 81003 URINALYSIS AUTO W/O SCOPE: CPT | Performed by: NURSE PRACTITIONER

## 2022-01-01 PROCEDURE — 87186 SC STD MICRODIL/AGAR DIL: CPT | Performed by: UROLOGY

## 2022-01-01 PROCEDURE — 81002 URINALYSIS NONAUTO W/O SCOPE: CPT | Performed by: NURSE PRACTITIONER

## 2022-01-01 PROCEDURE — 36415 COLL VENOUS BLD VENIPUNCTURE: CPT

## 2022-01-01 PROCEDURE — 82570 ASSAY OF URINE CREATININE: CPT

## 2022-01-01 PROCEDURE — 83970 ASSAY OF PARATHORMONE: CPT

## 2022-01-01 PROCEDURE — 96372 THER/PROPH/DIAG INJ SC/IM: CPT | Performed by: UROLOGY

## 2022-01-01 PROCEDURE — 82306 VITAMIN D 25 HYDROXY: CPT

## 2022-01-01 PROCEDURE — 80053 COMPREHEN METABOLIC PANEL: CPT | Performed by: NURSE PRACTITIONER

## 2022-01-01 PROCEDURE — 77067 SCR MAMMO BI INCL CAD: CPT

## 2022-01-01 PROCEDURE — 87077 CULTURE AEROBIC IDENTIFY: CPT | Performed by: NURSE PRACTITIONER

## 2022-01-01 PROCEDURE — 80069 RENAL FUNCTION PANEL: CPT

## 2022-01-01 PROCEDURE — 87086 URINE CULTURE/COLONY COUNT: CPT

## 2022-01-01 PROCEDURE — 81001 URINALYSIS AUTO W/SCOPE: CPT

## 2022-01-01 PROCEDURE — 84156 ASSAY OF PROTEIN URINE: CPT

## 2022-01-01 RX ORDER — DEXAMETHASONE 6 MG/1
TABLET ORAL
Status: ON HOLD | COMMUNITY
Start: 2022-01-01 | End: 2023-01-01

## 2022-01-01 RX ORDER — TRAMADOL HYDROCHLORIDE 50 MG/1
50 TABLET ORAL 2 TIMES DAILY
COMMUNITY
Start: 2022-01-01

## 2022-01-01 RX ORDER — GENTAMICIN SULFATE 40 MG/ML
80 INJECTION, SOLUTION INTRAMUSCULAR; INTRAVENOUS ONCE
Status: COMPLETED | OUTPATIENT
Start: 2022-01-01 | End: 2022-01-01

## 2022-01-01 RX ORDER — METOPROLOL TARTRATE AND HYDROCHLOROTHIAZIDE 50; 25 MG/1; MG/1
1 TABLET ORAL
COMMUNITY
End: 2022-01-01 | Stop reason: SDUPTHER

## 2022-01-01 RX ORDER — ALBUTEROL SULFATE 90 UG/1
2 AEROSOL, METERED RESPIRATORY (INHALATION) EVERY 6 HOURS PRN
Qty: 18 G | Refills: 5 | Status: SHIPPED | OUTPATIENT
Start: 2022-01-01 | End: 2022-01-01

## 2022-01-01 RX ORDER — IPRATROPIUM BROMIDE AND ALBUTEROL SULFATE 2.5; .5 MG/3ML; MG/3ML
3 SOLUTION RESPIRATORY (INHALATION) 4 TIMES DAILY PRN
Qty: 360 ML | Refills: 5 | Status: SHIPPED | OUTPATIENT
Start: 2022-01-01 | End: 2022-01-01 | Stop reason: SDUPTHER

## 2022-01-01 RX ORDER — ALBUTEROL SULFATE 90 UG/1
2 AEROSOL, METERED RESPIRATORY (INHALATION) EVERY 4 HOURS PRN
Qty: 18 G | Refills: 3 | Status: SHIPPED | OUTPATIENT
Start: 2022-01-01 | End: 2022-01-01 | Stop reason: SDUPTHER

## 2022-01-01 RX ORDER — CLINDAMYCIN PHOSPHATE 20 MG/G
CREAM VAGINAL
Qty: 80 G | Refills: 0 | Status: SHIPPED | OUTPATIENT
Start: 2022-01-01 | End: 2022-01-01 | Stop reason: SDUPTHER

## 2022-01-01 RX ORDER — PHENAZOPYRIDINE HYDROCHLORIDE 100 MG/1
100 TABLET, FILM COATED ORAL 2 TIMES DAILY WITH MEALS
Qty: 30 TABLET | Refills: 3 | Status: SHIPPED | OUTPATIENT
Start: 2022-01-01 | End: 2022-01-01

## 2022-01-01 RX ORDER — BACITRACIN ZINC 500 [USP'U]/G
OINTMENT TOPICAL
Status: ON HOLD | COMMUNITY
Start: 2022-01-01 | End: 2023-01-01

## 2022-01-01 RX ORDER — CLOBETASOL PROPIONATE 0.46 MG/ML
1 SOLUTION TOPICAL 2 TIMES DAILY PRN
COMMUNITY
Start: 2022-01-01

## 2022-01-01 RX ORDER — BUPIVACAINE HYDROCHLORIDE 5 MG/ML
10 INJECTION, SOLUTION PERINEURAL ONCE
Status: COMPLETED | OUTPATIENT
Start: 2022-01-01 | End: 2022-01-01

## 2022-01-01 RX ORDER — GENTAMICIN SULFATE 40 MG/ML
160 INJECTION, SOLUTION INTRAMUSCULAR; INTRAVENOUS ONCE
Status: COMPLETED | OUTPATIENT
Start: 2022-01-01 | End: 2022-01-01

## 2022-01-01 RX ORDER — ALBUTEROL SULFATE 90 UG/1
2 AEROSOL, METERED RESPIRATORY (INHALATION) EVERY 4 HOURS PRN
Qty: 18 G | Refills: 3 | Status: CANCELLED | OUTPATIENT
Start: 2022-01-01

## 2022-01-01 RX ORDER — IPRATROPIUM BROMIDE AND ALBUTEROL SULFATE 2.5; .5 MG/3ML; MG/3ML
3 SOLUTION RESPIRATORY (INHALATION) 4 TIMES DAILY PRN
Qty: 360 ML | Refills: 3 | Status: SHIPPED | OUTPATIENT
Start: 2022-01-01 | End: 2022-01-01 | Stop reason: SDUPTHER

## 2022-01-01 RX ORDER — IPRATROPIUM BROMIDE AND ALBUTEROL SULFATE 2.5; .5 MG/3ML; MG/3ML
3 SOLUTION RESPIRATORY (INHALATION) 4 TIMES DAILY PRN
Qty: 360 ML | Refills: 5 | Status: SHIPPED | OUTPATIENT
Start: 2022-01-01

## 2022-01-01 RX ORDER — CEPHALEXIN 250 MG/1
250 CAPSULE ORAL 3 TIMES DAILY
Qty: 21 CAPSULE | Refills: 0 | Status: SHIPPED | OUTPATIENT
Start: 2022-01-01 | End: 2022-01-01

## 2022-01-01 RX ORDER — GENTAMICIN SULFATE 40 MG/ML
80 INJECTION, SOLUTION INTRAMUSCULAR; INTRAVENOUS ONCE
Status: SHIPPED | OUTPATIENT
Start: 2022-01-01

## 2022-01-01 RX ORDER — HEPARIN SODIUM 1000 [USP'U]/ML
10000 INJECTION, SOLUTION INTRAVENOUS; SUBCUTANEOUS ONCE
Status: COMPLETED | OUTPATIENT
Start: 2022-01-01 | End: 2022-01-01

## 2022-01-01 RX ORDER — IPRATROPIUM BROMIDE 21 UG/1
2 SPRAY, METERED NASAL 4 TIMES DAILY
COMMUNITY
End: 2023-01-01 | Stop reason: SDUPTHER

## 2022-01-01 RX ORDER — HEPARIN SODIUM 1000 [USP'U]/ML
10000 INJECTION, SOLUTION INTRAVENOUS; SUBCUTANEOUS ONCE
Status: SHIPPED | OUTPATIENT
Start: 2022-01-01

## 2022-01-01 RX ORDER — BENZONATATE 200 MG/1
200 CAPSULE ORAL 3 TIMES DAILY PRN
Qty: 42 CAPSULE | Refills: 1 | Status: ON HOLD | OUTPATIENT
Start: 2022-01-01 | End: 2023-01-01

## 2022-01-01 RX ORDER — ESTRADIOL 0.1 MG/G
CREAM VAGINAL
Qty: 42.5 G | Refills: 5 | Status: SHIPPED | OUTPATIENT
Start: 2022-01-01 | End: 2022-01-01

## 2022-01-01 RX ORDER — LIDOCAINE HYDROCHLORIDE 20 MG/ML
6 SOLUTION OROPHARYNGEAL ONCE
Status: SHIPPED | OUTPATIENT
Start: 2022-01-01

## 2022-01-01 RX ORDER — GENTAMICIN SULFATE 40 MG/ML
80 INJECTION, SOLUTION INTRAMUSCULAR; INTRAVENOUS ONCE
Status: DISCONTINUED | OUTPATIENT
Start: 2022-01-01 | End: 2023-01-01

## 2022-01-01 RX ORDER — CEPHALEXIN 250 MG/1
250 CAPSULE ORAL 3 TIMES DAILY
Qty: 28 CAPSULE | Refills: 0 | Status: SHIPPED | OUTPATIENT
Start: 2022-01-01 | End: 2022-01-01

## 2022-01-01 RX ORDER — PREDNISONE 10 MG/1
TABLET ORAL
Qty: 30 TABLET | Refills: 0 | Status: ON HOLD | OUTPATIENT
Start: 2022-01-01 | End: 2023-01-01

## 2022-01-01 RX ORDER — CLINDAMYCIN PHOSPHATE 20 MG/G
CREAM VAGINAL
Qty: 40 G | Refills: 0 | Status: SHIPPED | OUTPATIENT
Start: 2022-01-01 | End: 2022-01-01 | Stop reason: SDUPTHER

## 2022-01-01 RX ORDER — KETOCONAZOLE 20 MG/ML
1 SHAMPOO TOPICAL 2 TIMES WEEKLY
COMMUNITY
Start: 2022-01-01

## 2022-01-01 RX ORDER — ESTRADIOL 10 UG/1
1 INSERT VAGINAL 2 TIMES WEEKLY
Qty: 8 TABLET | Refills: 5 | Status: SHIPPED | OUTPATIENT
Start: 2022-01-01 | End: 2022-01-01

## 2022-01-01 RX ORDER — ALBUTEROL SULFATE 90 UG/1
2 AEROSOL, METERED RESPIRATORY (INHALATION) EVERY 4 HOURS PRN
Qty: 18 G | Refills: 3 | Status: SHIPPED | OUTPATIENT
Start: 2022-01-01 | End: 2023-01-01 | Stop reason: SDUPTHER

## 2022-01-01 RX ORDER — ALBUTEROL SULFATE 90 UG/1
2 AEROSOL, METERED RESPIRATORY (INHALATION) EVERY 6 HOURS PRN
Qty: 18 G | Refills: 0 | Status: SHIPPED | OUTPATIENT
Start: 2022-01-01 | End: 2022-01-01 | Stop reason: SDUPTHER

## 2022-01-01 RX ORDER — METRONIDAZOLE 7.5 MG/G
GEL TOPICAL
Status: ON HOLD | COMMUNITY
Start: 2022-01-01 | End: 2023-01-01

## 2022-01-01 RX ORDER — PREDNISONE 20 MG/1
10 TABLET ORAL DAILY
Qty: 5 TABLET | Refills: 1 | Status: SHIPPED | OUTPATIENT
Start: 2022-01-01 | End: 2022-01-01

## 2022-01-01 RX ORDER — AZITHROMYCIN 250 MG/1
TABLET, FILM COATED ORAL
COMMUNITY
Start: 2022-01-01 | End: 2022-01-01

## 2022-01-01 RX ORDER — CLINDAMYCIN PHOSPHATE 20 MG/G
CREAM VAGINAL
Qty: 40 G | Refills: 0 | Status: SHIPPED | OUTPATIENT
Start: 2022-01-01 | End: 2023-01-01 | Stop reason: SDUPTHER

## 2022-01-01 RX ORDER — PEN NEEDLE, DIABETIC 32GX 5/32"
NEEDLE, DISPOSABLE MISCELLANEOUS
Status: ON HOLD | COMMUNITY
Start: 2022-01-01 | End: 2023-01-01

## 2022-01-01 RX ORDER — FUROSEMIDE 40 MG/1
40 TABLET ORAL
COMMUNITY
End: 2022-01-01 | Stop reason: SDUPTHER

## 2022-01-01 RX ORDER — CEPHALEXIN 500 MG/1
500 CAPSULE ORAL 2 TIMES DAILY
Qty: 40 CAPSULE | Refills: 0 | Status: SHIPPED | OUTPATIENT
Start: 2022-01-01 | End: 2022-01-01

## 2022-01-01 RX ORDER — NYSTATIN 100000 U/G
1 CREAM TOPICAL 2 TIMES DAILY
Qty: 30 G | Refills: 1 | Status: SHIPPED | OUTPATIENT
Start: 2022-01-01 | End: 2022-01-01

## 2022-01-01 RX ORDER — ESTRADIOL 10 UG/1
1 INSERT VAGINAL 2 TIMES WEEKLY
Qty: 8 TABLET | Refills: 6 | Status: SHIPPED | OUTPATIENT
Start: 2022-01-01 | End: 2022-01-01

## 2022-01-01 RX ADMIN — Medication 50 MEQ: at 11:37

## 2022-01-01 RX ADMIN — BUPIVACAINE HYDROCHLORIDE 10 ML: 5 INJECTION, SOLUTION PERINEURAL at 11:34

## 2022-01-01 RX ADMIN — HEPARIN SODIUM 10000 UNITS: 1000 INJECTION, SOLUTION INTRAVENOUS; SUBCUTANEOUS at 11:38

## 2022-01-01 RX ADMIN — Medication 50 MEQ: at 15:11

## 2022-01-01 RX ADMIN — GENTAMICIN SULFATE 80 MG: 40 INJECTION, SOLUTION INTRAMUSCULAR; INTRAVENOUS at 09:54

## 2022-01-01 RX ADMIN — GENTAMICIN SULFATE 80 MG: 40 INJECTION, SOLUTION INTRAMUSCULAR; INTRAVENOUS at 16:31

## 2022-01-01 RX ADMIN — GENTAMICIN SULFATE 80 MG: 40 INJECTION, SOLUTION INTRAMUSCULAR; INTRAVENOUS at 16:15

## 2022-01-01 RX ADMIN — GENTAMICIN SULFATE 80 MG: 40 INJECTION, SOLUTION INTRAMUSCULAR; INTRAVENOUS at 10:44

## 2022-01-01 RX ADMIN — GENTAMICIN SULFATE 80 MG: 40 INJECTION, SOLUTION INTRAMUSCULAR; INTRAVENOUS at 11:35

## 2022-01-01 RX ADMIN — Medication 50 MEQ: at 09:55

## 2022-01-01 RX ADMIN — GENTAMICIN SULFATE 160 MG: 40 INJECTION, SOLUTION INTRAMUSCULAR; INTRAVENOUS at 13:58

## 2022-01-01 RX ADMIN — HEPARIN SODIUM 10000 UNITS: 1000 INJECTION, SOLUTION INTRAVENOUS; SUBCUTANEOUS at 10:45

## 2022-01-01 RX ADMIN — Medication 50 MEQ: at 10:46

## 2022-01-01 RX ADMIN — GENTAMICIN SULFATE 160 MG: 40 INJECTION, SOLUTION INTRAMUSCULAR; INTRAVENOUS at 10:04

## 2022-01-01 RX ADMIN — Medication 50 MEQ: at 16:35

## 2022-01-01 RX ADMIN — GENTAMICIN SULFATE 80 MG: 40 INJECTION, SOLUTION INTRAMUSCULAR; INTRAVENOUS at 16:36

## 2022-01-01 RX ADMIN — BUPIVACAINE HYDROCHLORIDE 10 ML: 5 INJECTION, SOLUTION PERINEURAL at 09:53

## 2022-01-04 ENCOUNTER — TELEPHONE (OUTPATIENT)
Dept: PULMONOLOGY | Facility: CLINIC | Age: 87
End: 2022-01-04

## 2022-01-05 ENCOUNTER — READMISSION MANAGEMENT (OUTPATIENT)
Dept: CALL CENTER | Facility: HOSPITAL | Age: 87
End: 2022-01-05

## 2022-01-05 NOTE — OUTREACH NOTE
COPD/PN Week 2 Survey      Responses   [facility] discharged from? [facility]   Does the patient have one of the following disease processes/diagnoses(primary or secondary)? COPD/Pneumonia   Was the primary reason for admission: Pneumonia   Week 2 attempt successful? Yes   Call start time 1456   Call end time 1459   Discharge diagnosis cellulitis & abscess of foot, PNA, milar mass   List who call center can speak with Irina-daughter   Person spoke with today (if not patient) and relationship Irina-daughter   Meds reviewed with patient/caregiver? Yes   Is the patient having any side effects they believe may be caused by any medication additions or changes? No   Does the patient have all medications ordered at discharge? Yes   Is the patient taking all medications as directed (includes completed medication regime)? Yes   Does the patient have a primary care provider?  Yes   Does the patient have an appointment with their PCP or specialist within 7 days of discharge? Greater than 7 days   Comments regarding PCP Hosp DC FU appt 1/17/22   What is preventing the patient from scheduling follow up appointments within 7 days of discharge? --  [cancelled first PCP appt. which was 1/3/22 and rescheduled]   Nursing Interventions Verified appointment date/time/provider   Has the patient kept scheduled appointments due by today? N/A   Has home health visited the patient within 72 hours of discharge? Yes   What DME was ordered? O2 from [employer]   Has all DME been delivered? Yes   Pulse Ox monitoring Intermittent   Pulse Ox device source Patient   O2 Sat comments O2 sat 95% on 2L   O2 Sat: education provided Sat levels,  When to seek care,  Monitoring frequency   Psychosocial issues? No   Did the patient receive a copy of their discharge instructions? Yes   Nursing interventions Reviewed instructions with patient   What is the patient's perception of their health status since discharge? Improving   Nursing Interventions  Nurse provided patient education   Are the patient's immunizations up to date?  Yes   Nursing interventions Educated on importance of maintaining up to date immunizations as advised by provider   If the patient is a current smoker, are they able to teach back resources for cessation? Not a smoker   Is the patient/caregiver able to teach back the hierarchy of who to call/visit for symptoms/problems? PCP, Specialist, Home health nurse, Urgent Care, ED, 911 Yes   Is the patient/caregiver able to teach back signs and symptoms of worsening condition: Fever/chills,  Shortness of breath,  Chest pain   Is the patient/caregiver able to teach back importance of completing antibiotic course of treatment? Yes   Week 2 call completed? Yes          Delmis Marie RN

## 2022-01-07 LAB
CYTO UR: NORMAL
LAB AP CASE REPORT: NORMAL
LAB AP CLINICAL INFORMATION: NORMAL
LAB AP DIAGNOSIS COMMENT: NORMAL
LAB AP SPECIAL STAINS: NORMAL
Lab: NORMAL
PATH REPORT.ADDENDUM SPEC: NORMAL
PATH REPORT.FINAL DX SPEC: NORMAL
PATH REPORT.GROSS SPEC: NORMAL
STAT OF ADQ CVX/VAG CYTO-IMP: NORMAL

## 2022-01-13 ENCOUNTER — OFFICE VISIT (OUTPATIENT)
Dept: PULMONOLOGY | Facility: CLINIC | Age: 87
End: 2022-01-13

## 2022-01-13 VITALS — OXYGEN SATURATION: 97 % | HEART RATE: 51 BPM

## 2022-01-13 DIAGNOSIS — J44.9 CHRONIC OBSTRUCTIVE PULMONARY DISEASE, UNSPECIFIED COPD TYPE: ICD-10-CM

## 2022-01-13 DIAGNOSIS — C34.91 ADENOCARCINOMA OF RIGHT LUNG: Primary | ICD-10-CM

## 2022-01-13 PROCEDURE — 99213 OFFICE O/P EST LOW 20 MIN: CPT | Performed by: NURSE PRACTITIONER

## 2022-01-13 RX ORDER — AMLODIPINE BESYLATE 2.5 MG/1
2.5 TABLET ORAL DAILY
Status: ON HOLD | COMMUNITY
End: 2023-01-01

## 2022-01-13 RX ORDER — ROSUVASTATIN CALCIUM 20 MG/1
20 TABLET, COATED ORAL DAILY
Status: ON HOLD | COMMUNITY
End: 2023-01-01

## 2022-01-13 NOTE — PROGRESS NOTES
Primary Care Provider  Keanu Manzanares MD   Referring Provider  No ref. provider found    Patient Complaint  Results (bronch results)      SUBJECTIVE    History of Presenting Illness  Laury Palacios is a 89 y.o. female who presents to [facility] for telephone visit with patient and daughter, Lizbeth.  Patient doing well overall patient is aware of diagnosis of right lung cancer from .  She is scheduled to have a PET scan done 2022 ordered by .  In addition she had a MRI of the brain done on 2021 which showed no intracranial abnormalities.  Patient is currently on oxygen at 2 L continuous via nasal cannula.  Patient is living with daughter Lizbeth at this time who is caring for her uses a walker for ambulation.Patient denies dyspnea, coughing, wheezing, headaches, chest pain, weight loss or hemoptysis. Denies fevers, chills and night sweats. Laury Palacios is able to perform ADLs without difficulties  With assist from daughter and denies any swollen glands/lymph nodes in the head or neck.    I have personally reviewed the review of systems, past family, social, medical and surgical histories; and agree with their findings.    Review of Systems  Constitutional symptoms:  Denied complaints   Ear, nose, throat: Denied complaints  Cardiovascular:  Denied complaints  Respiratory: Shortness of air, on O2 at 2 L via nasal cannula continuous  Gastrointestinal: Denied complaints  Musculoskeletal: Denied complaints    Family History   Problem Relation Age of Onset   • No Known Problems Mother    • No Known Problems Father    • Breast cancer Sister         30s   • Malig Hyperthermia Neg Hx         Social History     Socioeconomic History   • Marital status:    Tobacco Use   • Smoking status: Former Smoker     Packs/day: 0.50     Years: 20.00     Pack years: 10.00     Types: Cigarettes     Quit date:      Years since quittin.0   •  Smokeless tobacco: Never Used   Vaping Use   • Vaping Use: Never used   Substance and Sexual Activity   • Alcohol use: Never   • Drug use: No   • Sexual activity: Defer        Past Medical History:   Diagnosis Date   • Acid reflux    • Anemia    • Arthritis    • Asthma    • Bladder disorder    • Chronic renal failure    • Condition not found     Ulcer   • DDD (degenerative disc disease), lumbar    • Diabetes mellitus (HCC)     TYPE TWO   • Dysuria    • Ectropion of left eye    • Gastric ulcer    • Hemorrhoids 2018    grade II   • High blood pressure    • History of transfusion    • Hyperlipidemia    • Hypertension    • Limb pain    • Limb swelling    • Long-term current use of insulin for diabetes mellitus (HCC)    • Melanoma (HCC)     BACK   • Neck fracture (HCC) 1986    C4C5 NO SURGERY   • Peripheral neuropathy    • Rectal bleeding    • Spinal stenosis    • Squamous cell carcinoma     FACE REMOVED   • Stroke (HCC)    • Stuttering    • TIA (transient ischemic attack)         Immunization History   Administered Date(s) Administered   • COVID-19 (MODERNA) 1st, 2nd, 3rd Dose Only 02/25/2021, 03/17/2021   • COVID-19 (PFIZER) 02/09/2021, 02/09/2021, 03/02/2021, 03/02/2021, 12/02/2021   • Flu Vaccine Quad PF >18YRS 09/26/2014, 12/15/2020       Allergies   Allergen Reactions   • Codeine Nausea And Vomiting   • Morphine Nausea And Vomiting   • Ciprofloxacin Rash          Current Outpatient Medications:   •  amLODIPine (NORVASC) 2.5 MG tablet, Take 2.5 mg by mouth Daily., Disp: , Rfl:   •  calcium-vitamin D (Oscal 500/200 D-3) 500-200 MG-UNIT per tablet, Take 1 tablet by mouth 2 (Two) Times a Day., Disp: , Rfl:   •  estradiol (ESTRACE) 0.1 MG/GM vaginal cream, Apply 0.5g vaginally 2 times a week in evening, Disp: 42.5 g, Rfl: 2  •  folic acid (FOLVITE) 1 MG tablet, Take 1 mg by mouth Daily., Disp: , Rfl:   •  furosemide (LASIX) 40 MG tablet, Take 40 mg by mouth Daily As Needed (FOR SWELLING)., Disp: , Rfl:   •  gabapentin  (NEURONTIN) 300 MG capsule, Take 300 mg by mouth 3 (Three) Times a Day., Disp: , Rfl:   •  insulin aspart (novoLOG FLEXPEN) 100 UNIT/ML solution pen-injector sc pen, Inject 10 Units under the skin into the appropriate area as directed 3 (Three) Times a Day With Meals. BREAKFAST AND DINNER, Disp: , Rfl:   •  insulin detemir (LEVEMIR FLEXTOUCH) 100 UNIT/ML injection, Inject 40 Units under the skin into the appropriate area as directed 2 (Two) Times a Day. BREAKFAST AND DINNER, Disp: , Rfl:   •  lisinopril (PRINIVIL,ZESTRIL) 2.5 MG tablet, Take 2.5 mg by mouth Daily., Disp: , Rfl:   •  LORazepam (ATIVAN) 0.5 MG tablet, Take 0.5 mg by mouth 2 (Two) Times a Day., Disp: , Rfl:   •  metoprolol-hydrochlorothiazide (LOPRESSOR HCT) 50-25 MG per tablet, Take 1 tablet by mouth 2 (Two) Times a Day., Disp: , Rfl:   •  multivitamin with minerals (CENTRUM SILVER PO), Take 1 tablet by mouth Daily., Disp: , Rfl:   •  nystatin (MYCOSTATIN) 577071 UNIT/GM cream, Apply 1 application topically to the appropriate area as directed 2 (Two) Times a Day for 30 days., Disp: 60 g, Rfl: 0  •  omeprazole (priLOSEC) 20 MG capsule, Take 20 mg by mouth Daily., Disp: , Rfl:   •  rOPINIRole (REQUIP) 0.25 MG tablet, Take 0.25 mg by mouth Every Night. Take 1 hour before bedtime., Disp: , Rfl:   •  rosuvastatin (CRESTOR) 20 MG tablet, Take 20 mg by mouth Daily., Disp: , Rfl:   •  theophylline (UNIPHYL) 400 MG 24 hr tablet, Take 400 mg by mouth Daily., Disp: , Rfl:   •  nystatin (MYCOSTATIN) 895190 UNIT/GM cream, Apply  topically to the appropriate area as directed As Needed (twice a day to irritation)., Disp: 30 g, Rfl: 6       OBJECTIVE    Vital Signs   Pulse 51   SpO2 97% Comment: 2L/m    Physical Exam  Deferred due to telephone visit    Results Review  I have personally reviewed the bronchoscopy reports from 12/15/2021 with the following:  Final Diagnosis   1. Lung, right lower lobe, BAL:               - Negative for malignant cells               -  No viral inclusions     2. Lung, right lower lobe, bronchial washing:               - Atypical cells     3. Lung, right lower lobe, bronchial brushing:               - Suspicious for adenocarcinoma     4. Right hilar mass, FNA:               - Adenocarcinoma         ASSESSMENT & PLAN    Patient Active Problem List   Diagnosis   • Osteoarthritis   • Asthma   • Bladder disorder   • Chronic obstructive pulmonary disease (HCC)   • Depression   • Diabetes mellitus (HCC)   • Diabetic renal disease (HCC)   • Hemorrhoids   • Generalized anxiety disorder   • Gastro-esophageal reflux disease with esophagitis   • Edema   • Difficult or painful urination   • Post-menopausal bleeding   • Neuropathy   • Other and unspecified hyperlipidemia   • Melanoma (HCC)   • Long term current use of insulin (HCC)   • Limb swelling   • Hypothyroid   • Essential hypertension   • Vitamin D deficiency   • Ulcer disease   • Type 2 diabetes mellitus with hyperglycemia (HCC)   • Stuttering   • Stroke (HCC)   • Stage 3 chronic kidney disease (HCC)   • Restless legs syndrome   • Rectal bleeding   • S/P carotid endarterectomy   • Acute cystitis without hematuria   • Cellulitis and abscess of foot   • Cellulitis   • Hilar mass       Diagnoses and all orders for this visit:    1. Adenocarcinoma of right lung (HCC) (Primary)    2. Chronic obstructive pulmonary disease, unspecified COPD type (HCC)           Plan:  Discussed with patient and daughter the plan for the PET scan next week and follow-up with Dr. Abebe.  Recommend a follow-up appointment here January 20, 2022 after she sees  so that I can assess her heart and lung sounds and follow-up on the results.    This was an audio and video enabled telemedicine encounter.  Total time spent via telephone visit was 25 minutes.    Smoking status: Former   Vaccination status: Up to date  Medications personally reviewed    Follow Up  Return in about 1 week (around 1/20/2022).    Patient was given  instructions and counseling regarding her condition or for health maintenance advice. Please see specific information pulled into the AVS if appropriate.

## 2022-01-17 ENCOUNTER — TRANSCRIBE ORDERS (OUTPATIENT)
Dept: ADMINISTRATIVE | Facility: HOSPITAL | Age: 87
End: 2022-01-17

## 2022-01-17 DIAGNOSIS — C34.31 SQUAMOUS CELL CARCINOMA OF BRONCHUS IN RIGHT LOWER LOBE: Primary | ICD-10-CM

## 2022-01-18 ENCOUNTER — APPOINTMENT (OUTPATIENT)
Dept: PET IMAGING | Facility: HOSPITAL | Age: 87
End: 2022-01-18

## 2022-01-20 ENCOUNTER — OFFICE VISIT (OUTPATIENT)
Dept: PULMONOLOGY | Facility: CLINIC | Age: 87
End: 2022-01-20

## 2022-01-20 VITALS
TEMPERATURE: 97.2 F | WEIGHT: 164 LBS | SYSTOLIC BLOOD PRESSURE: 142 MMHG | BODY MASS INDEX: 28 KG/M2 | HEIGHT: 64 IN | RESPIRATION RATE: 18 BRPM | HEART RATE: 61 BPM | OXYGEN SATURATION: 99 % | DIASTOLIC BLOOD PRESSURE: 54 MMHG

## 2022-01-20 DIAGNOSIS — C34.91 ADENOCARCINOMA OF RIGHT LUNG: Primary | ICD-10-CM

## 2022-01-20 DIAGNOSIS — J44.9 CHRONIC OBSTRUCTIVE PULMONARY DISEASE, UNSPECIFIED COPD TYPE: ICD-10-CM

## 2022-01-20 LAB — FUNGUS WND CULT: NORMAL

## 2022-01-20 PROCEDURE — 99213 OFFICE O/P EST LOW 20 MIN: CPT | Performed by: NURSE PRACTITIONER

## 2022-01-20 NOTE — PROGRESS NOTES
Primary Care Provider  Keanu Manzanares MD   Referring Provider  No ref. provider found    Patient Complaint  Asthma, COPD, Shortness of Breath, and Follow-up      SUBJECTIVE    History of Presenting Illness  Laury Palacios is a very pleasant  89 y.o. female who presents to [facility] MEDICINE with her daughter and granddaughter for review of her CT scan.  Patient with hilar mass and she is under care of Dr. Antunez, oncology. Patient underwent a Bronchoscopy by Dr. Mejia on 12/23/2021 which cytology and pathology results in adenocarcinoma right hilar mass, FNA, right lower lobe bronchial washing with atypical cells, right lower lobe suspicious for adenocarcinoma. Patient had a MRI of brain done 12/23/2021 with findings of no acute intracranial abnormalities identified. In addition, PET scan has been ordered but not done as of yet, scheduled next month per daughter. Patient is on 02 at 2 L/m via NC. Patient states she does have wheezing and dry cough at times. She states she does have arthritis in her knees and back, neuropathy due to diabetes in her legs/feet. Takes gabapentin for neuropathy and ibuprofen for pain.     Denies dyspnea, headaches, chest pain, weight loss or hemoptysis. Denies fevers, chills and night sweats. Laury Palacios is able to perform ADLs without difficulties with assistance from family and denies any swollen glands/lymph nodes in the head or neck.    I have personally reviewed the review of systems, past family, social, medical and surgical histories; and agree with their findings.    Review of Systems  Constitutional symptoms:  Denied complaints   Ear, nose, throat: Denied complaints  Cardiovascular:  Denied complaints  Respiratory: wheeze, dry cough  Gastrointestinal: Denied complaints  Musculoskeletal:arthritis knees, back    Family History   Problem Relation Age of Onset   • No Known Problems Mother    • No Known Problems Father    •  Breast cancer Sister         30s   • Malig Hyperthermia Neg Hx         Social History     Socioeconomic History   • Marital status:    Tobacco Use   • Smoking status: Former Smoker     Packs/day: 0.50     Years: 20.00     Pack years: 10.00     Types: Cigarettes     Quit date:      Years since quittin.0   • Smokeless tobacco: Never Used   Vaping Use   • Vaping Use: Never used   Substance and Sexual Activity   • Alcohol use: Never   • Drug use: No   • Sexual activity: Defer        Past Medical History:   Diagnosis Date   • Acid reflux    • Anemia    • Arthritis    • Asthma    • Bladder disorder    • Chronic renal failure    • Condition not found     Ulcer   • DDD (degenerative disc disease), lumbar    • Diabetes mellitus (HCC)     TYPE TWO   • Dysuria    • Ectropion of left eye    • Gastric ulcer    • Hemorrhoids 2018    grade II   • High blood pressure    • History of transfusion    • Hyperlipidemia    • Hypertension    • Limb pain    • Limb swelling    • Long-term current use of insulin for diabetes mellitus (HCC)    • Melanoma (HCC)     BACK   • Neck fracture (HCC)     C4C5 NO SURGERY   • Peripheral neuropathy    • Rectal bleeding    • Spinal stenosis    • Squamous cell carcinoma     FACE REMOVED   • Stroke (HCC)    • Stuttering    • TIA (transient ischemic attack)         Immunization History   Administered Date(s) Administered   • COVID-19 (MODERNA) 1st, 2nd, 3rd Dose Only 2021, 2021, 2021, 2021, 2021, 2021   • COVID-19 (PFIZER) PURPLE CAP 2021, 2021, 2021, 2021, 2021, 2021, 2021   • Flu Vaccine Quad PF >18YRS 2014, 12/15/2020, 10/12/2021   • Influenza Quad Vaccine (Inpatient) 2014, 12/15/2020       Allergies   Allergen Reactions   • Codeine Nausea And Vomiting   • Morphine Nausea And Vomiting   • Ciprofloxacin Rash          Current Outpatient Medications:   •  amLODIPine (NORVASC) 2.5 MG tablet, Take 2.5  mg by mouth Daily., Disp: , Rfl:   •  calcium-vitamin D (Oscal 500/200 D-3) 500-200 MG-UNIT per tablet, Take 1 tablet by mouth 2 (Two) Times a Day., Disp: , Rfl:   •  estradiol (ESTRACE) 0.1 MG/GM vaginal cream, Apply 0.5g vaginally 2 times a week in evening, Disp: 42.5 g, Rfl: 2  •  folic acid (FOLVITE) 1 MG tablet, Take 1 mg by mouth Daily., Disp: , Rfl:   •  furosemide (LASIX) 40 MG tablet, Take 40 mg by mouth Daily As Needed (FOR SWELLING)., Disp: , Rfl:   •  gabapentin (NEURONTIN) 300 MG capsule, Take 300 mg by mouth 3 (Three) Times a Day., Disp: , Rfl:   •  insulin aspart (novoLOG FLEXPEN) 100 UNIT/ML solution pen-injector sc pen, Inject 10 Units under the skin into the appropriate area as directed 3 (Three) Times a Day With Meals. BREAKFAST AND DINNER, Disp: , Rfl:   •  insulin detemir (LEVEMIR FLEXTOUCH) 100 UNIT/ML injection, Inject 40 Units under the skin into the appropriate area as directed 2 (Two) Times a Day. BREAKFAST AND DINNER, Disp: , Rfl:   •  lisinopril (PRINIVIL,ZESTRIL) 2.5 MG tablet, Take 2.5 mg by mouth Daily., Disp: , Rfl:   •  LORazepam (ATIVAN) 0.5 MG tablet, Take 0.5 mg by mouth 2 (Two) Times a Day., Disp: , Rfl:   •  metoprolol-hydrochlorothiazide (LOPRESSOR HCT) 50-25 MG per tablet, Take 1 tablet by mouth 2 (Two) Times a Day., Disp: , Rfl:   •  multivitamin with minerals (CENTRUM SILVER PO), Take 1 tablet by mouth Daily., Disp: , Rfl:   •  nystatin (MYCOSTATIN) 281008 UNIT/GM cream, Apply 1 application topically to the appropriate area as directed 2 (Two) Times a Day for 30 days., Disp: 60 g, Rfl: 0  •  omeprazole (priLOSEC) 20 MG capsule, Take 20 mg by mouth Daily., Disp: , Rfl:   •  rOPINIRole (REQUIP) 0.25 MG tablet, Take 0.25 mg by mouth Every Night. Take 1 hour before bedtime., Disp: , Rfl:   •  rosuvastatin (CRESTOR) 20 MG tablet, Take 20 mg by mouth Daily., Disp: , Rfl:   •  theophylline (UNIPHYL) 400 MG 24 hr tablet, Take 400 mg by mouth Daily., Disp: , Rfl:   "      OBJECTIVE    Vital Signs   /54 (BP Location: Right arm, Patient Position: Sitting, Cuff Size: Adult)   Pulse 61   Temp 97.2 °F (36.2 °C) (Tympanic)   Resp 18   Ht 162.6 cm (64\")   Wt 74.4 kg (164 lb)   SpO2 99% Comment: nasal cannula/ 2 liters continous  BMI 28.15 kg/m²     Physical Exam  Vital Signs Reviewed   WDWN, Alert, NAD.    HEENT:  PERRL, EOMI.  OP, nares clear  Neck:  Supple, no JVD, no thyromegaly  Chest:  occasional wheezing noted bilateral lung fields, no work of breathing noted  CV: RRR, no MGR, pulses 2+, equal.  Abd:  Soft, NT, ND, + BS, no HSM  EXT:  no clubbing, no cyanosis, bilateral trace edema  Neuro:  A&Ox3, CN grossly intact, no focal deficits.  Skin: No rashes or lesions noted    Results Review  I have personally reviewed the progress notes, diagnostic imaging with CT scan of 12/20/2021 with the following:    PROCEDURE:  CT CHEST WO CONTRAST DIAGNOSTIC     COMPARISON: [facility], CT, CHEST W/O CONTRAST, 4/01/2021, 10:18.     INDICATIONS:  Dyspnea, chronic, central airway disease suspected     TECHNIQUE:    CT images were created without the administration of contrast material.       PROTOCOL:     Standard imaging protocol performed                 RADIATION:      DLP: 472.39 mGy*cm               Automated exposure control was utilized to minimize radiation dose.      FINDINGS:          Patchy opacity is seen throughout the right upper lobe, right middle lobe, and there is a more   dense, somewhat linear area of opacity in the peribronchial right lower lobe extending into the   right lung base.  These findings have progressed since the previous study.  No adenopathy in the   chest.  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  No adenopathy.  No aggressive appearing bone change.     CONCLUSION: Patchy areas of opacity in the right lung, similar in distribution to the previous "   study, but more dense.  Findings are suspicious for chronic pneumonia.     There is some fullness in the right hilum, surrounding the central bronchi.  Consider bronchoscopy   to exclude the possibility of a right hilar mass.    Bronchoscopy with  Cytology/Pathology report 12/23/2021 with the following:       Non-gynecologic Cytology  Edited Result - FINAL 12/23/2021   Final Diagnosis   1. Lung, right lower lobe, BAL:               - Negative for malignant cells               - No viral inclusions     2. Lung, right lower lobe, bronchial washing:               - Atypical cells     3. Lung, right lower lobe, bronchial brushing:               - Suspicious for adenocarcinoma     4. Right hilar mass, FNA:               - Adenocarcinoma           Tissue Pathology Exam: [id number]  Order: [id number]   Status: Final result     Visible to patient: No (not released)     Next appt: 02/24/2022 at 02:15 PM in Family Medicine ([facility])     Dx: Hilar mass    Specimen Information: Lung, Right Lower Lobe; Tissue         0 Result Notes    Component    Case Report   Surgical Pathology Report                         Case: [id number]                                   Authorizing Provider:  Glynn Mejia, Collected:           12/23/2021 09:38 AM                                  DO                                                                            Ordering Location:     [facility]  Received:            12/27/2021 10:59 AM                                  [facility]                                                                              UNIT                                                                          Pathologist:           Toni Sanchez MD                                                             Specimen:    Lung, Right Lower Lobe, RIGHT LOWER LOBE BRONCH BX                                         Clinical Information    Comment:    Hilar mass      Final  Diagnosis   Lung, right lower lobe bronchus, biopsy:               - Adenocarcinoma            ASSESSMENT & PLAN    Patient Active Problem List   Diagnosis   • Osteoarthritis   • Asthma   • Bladder disorder   • Chronic obstructive pulmonary disease (HCC)   • Depression   • Diabetes mellitus (HCC)   • Diabetic renal disease (HCC)   • Hemorrhoids   • Generalized anxiety disorder   • Gastro-esophageal reflux disease with esophagitis   • Edema   • Difficult or painful urination   • Post-menopausal bleeding   • Neuropathy   • Other and unspecified hyperlipidemia   • Melanoma (HCC)   • Long term current use of insulin (HCC)   • Limb swelling   • Hypothyroid   • Essential hypertension   • Vitamin D deficiency   • Ulcer disease   • Type 2 diabetes mellitus with hyperglycemia (HCC)   • Stuttering   • Stroke (HCC)   • Stage 3 chronic kidney disease (HCC)   • Restless legs syndrome   • Rectal bleeding   • S/P carotid endarterectomy   • Acute cystitis without hematuria   • Cellulitis and abscess of foot   • Cellulitis   • Hilar mass       Diagnoses and all orders for this visit:    1. Adenocarcinoma of right lung (HCC) (Primary)    2. Chronic obstructive pulmonary disease, unspecified COPD type (HCC)    Plan:  Discussed with patient, daughter, granddaughter findings from the bronchoscopy.  Patient and patient's daughter discussed in detail declining the PET scan at this time as patient is 89 years of age and not wanting any surgery at this time or possible chemo radiation.  Patient and patient's daughter also inquired about hospice care in case patient needs pain medicine down the road.  I encouraged patient and daughter to discuss with  what her wishes are as far as treatment for adenocarcinoma, further diagnostics, and management for pain in the future.  I also encouraged the patient to reach out to her family provider to discuss pain management possible hospice referral if she so desires.  I asked the patient to  follow-up with me in 3 months or sooner if she needs us.    Smoking status: Former quit in 1992  Vaccination status: Up-to-date per patient  Medications personally reviewed    Follow Up  Return in about 3 months (around 4/20/2022).    Patient was given instructions and counseling regarding her condition or for health maintenance advice. Please see specific information pulled into the AVS if appropriate.

## 2022-01-31 ENCOUNTER — HOSPITAL ENCOUNTER (OUTPATIENT)
Dept: PET IMAGING | Facility: HOSPITAL | Age: 87
Discharge: HOME | End: 2022-01-31

## 2022-01-31 DIAGNOSIS — C34.31 SQUAMOUS CELL CARCINOMA OF BRONCHUS IN RIGHT LOWER LOBE: ICD-10-CM

## 2022-01-31 PROCEDURE — A9552 F18 FDG: HCPCS | Performed by: INTERNAL MEDICINE

## 2022-01-31 PROCEDURE — 0 FLUDEOXYGLUCOSE F18 SOLUTION: Performed by: INTERNAL MEDICINE

## 2022-01-31 PROCEDURE — 78815 PET IMAGE W/CT SKULL-THIGH: CPT

## 2022-01-31 RX ADMIN — FLUDEOXYGLUCOSE F18 1 DOSE: 300 INJECTION INTRAVENOUS at 08:59

## 2022-02-03 LAB
MYCOBACTERIUM SPEC CULT: NORMAL
NIGHT BLUE STAIN TISS: NORMAL
NIGHT BLUE STAIN TISS: NORMAL

## 2022-02-08 ENCOUNTER — TRANSCRIBE ORDERS (OUTPATIENT)
Dept: ADMINISTRATIVE | Facility: HOSPITAL | Age: 87
End: 2022-02-08

## 2022-02-08 DIAGNOSIS — Z12.31 VISIT FOR SCREENING MAMMOGRAM: Primary | ICD-10-CM

## 2022-02-14 ENCOUNTER — CONSULT (OUTPATIENT)
Dept: RADIATION ONCOLOGY | Facility: HOSPITAL | Age: 87
End: 2022-02-14

## 2022-02-14 VITALS
TEMPERATURE: 99.1 F | RESPIRATION RATE: 16 BRPM | BODY MASS INDEX: 27.06 KG/M2 | HEART RATE: 56 BPM | OXYGEN SATURATION: 99 % | WEIGHT: 157.63 LBS | DIASTOLIC BLOOD PRESSURE: 47 MMHG | SYSTOLIC BLOOD PRESSURE: 135 MMHG

## 2022-02-14 DIAGNOSIS — C34.31 PRIMARY ADENOCARCINOMA OF LOWER LOBE OF RIGHT LUNG: Primary | ICD-10-CM

## 2022-02-14 PROBLEM — N39.0 URINARY TRACT INFECTIOUS DISEASE: Status: ACTIVE | Noted: 2021-12-13

## 2022-02-14 PROBLEM — H35.9 RETINAL DISORDER: Status: ACTIVE | Noted: 2021-12-13

## 2022-02-14 PROBLEM — M79.2 NEURALGIA: Status: ACTIVE | Noted: 2022-02-14

## 2022-02-14 PROBLEM — R80.9 PROTEINURIA: Status: ACTIVE | Noted: 2021-12-13

## 2022-02-14 PROBLEM — E87.5 HYPERKALEMIA: Status: ACTIVE | Noted: 2021-12-13

## 2022-02-14 PROCEDURE — 99204 OFFICE O/P NEW MOD 45 MIN: CPT | Performed by: RADIOLOGY

## 2022-02-14 RX ORDER — ALPRAZOLAM 0.5 MG/1
TABLET ORAL
COMMUNITY
Start: 2022-01-26 | End: 2022-03-22

## 2022-02-14 RX ORDER — LEVOFLOXACIN 250 MG/1
TABLET ORAL
COMMUNITY
Start: 2022-02-02 | End: 2022-03-22

## 2022-02-14 RX ORDER — SUCRALFATE 1 G/1
1 TABLET ORAL 2 TIMES DAILY
COMMUNITY
Start: 2022-02-02

## 2022-02-14 NOTE — PROGRESS NOTES
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  Ms. Palacios underwent chest x-ray on December 15, 2021 for complaints of shortness of breath.  This revealed mild airspace opacity in the right lung likely secondary to pneumonia.  She recovered from this but subsequently underwent CT scan of the chest for complaints of chronic dyspnea and history of central airway disease.  The CT scan on December 20, 2021 revealed patchy areas of opacity in the right lung similar in distribution to the prior study, but there was some fullness in the right hilum surrounding the central bronchi.  She did undergo bronchoscopy and biopsy of the right lower lobe with pathology revealing adenocarcinoma.  PET/CT on January 31, 2022 revealed metabolic activity in the right hilum with a max SUV of 4 mild narrowing of the adjacent bronchi likely due to neoplasm.  There was focal activity in the left neck that may be vascular activity.  Ms. Palacios denies any changes in breathing over the past 6 months.  She reports feeling well overall and is performing activities of daily living per her usual.  She does live with her daughter and requires some assistance.  She denies chest pain, weight loss, headaches, vision changes, bone pain or focal weakness.      Subjective        Review of Systems: Review of Systems   Constitutional: Positive for fatigue and unexpected weight change (wt loss in the last 5 months intentional). Negative for appetite change.   HENT: Negative for sore throat.     Eyes: Negative for visual disturbance.   Respiratory: Positive for shortness of breath. Negative for cough.         On 2 L O2 via NC continuous   Cardiovascular: Negative for chest pain and palpitations.   Gastrointestinal: Positive for diarrhea (diet induced). Negative for constipation and nausea.   Genitourinary: Negative for difficulty urinating and dysuria.   Musculoskeletal: Positive for arthralgias, back pain and gait problem (neuropathy in legs).   Skin: Negative for color change.   Neurological: Positive for dizziness (with position changes). Negative for headaches.   Psychiatric/Behavioral: Negative for agitation, sleep disturbance and suicidal ideas.       Past Medical History:   Past Medical History:   Diagnosis Date   • Acid reflux    • Anemia    • Arthritis    • Asthma    • Bladder disorder    • Chronic renal failure    • Condition not found     Ulcer   • DDD (degenerative disc disease), lumbar    • Diabetes mellitus (HCC)     TYPE TWO   • Dysuria    • Ectropion of left eye    • Gastric ulcer    • Hemorrhoids 2018    grade II   • High blood pressure    • History of transfusion    • Hyperlipidemia    • Hypertension    • Limb pain    • Limb swelling    • Long-term current use of insulin for diabetes mellitus (HCC)    • Melanoma (HCC)     BACK   • Neck fracture (HCC) 1986    C4C5 NO SURGERY   • Peripheral neuropathy    • Rectal bleeding    • Spinal stenosis    • Squamous cell carcinoma     FACE REMOVED   • Stroke (HCC)    • Stuttering    • TIA (transient ischemic attack)        Past Surgical History:   Past Surgical History:   Procedure Laterality Date   • APPENDECTOMY     • BRONCHOSCOPY N/A 12/23/2021    Procedure: BRONCHOSCOPY WITH ENDOBRONCHIAL ULTRASOUND, RIGHT LOWER LOBE WASHINGS, BIOPSIES, BAL;  Surgeon: Glynn Mejia DO;  Location: [facility] ENDOSCOPY;  Service: Pulmonary;  Laterality: N/A;  RIGHT LOWER LOBE ENDOBRONCHIAL MASS   • CAROTID ENDARTERECTOMY      BILAT, 6 YEARS APART IN 1990S    • CATARACT EXTRACTION     • CHOLECYSTECTOMY     • COLONOSCOPY  01/15/2019    Elder   • ECTROPION REPAIR Left 2017    Procedure: LEFT LOWER LID CICATRICIAL ECTROPION  FULL THICKNESS SKIN GRAFT WITH CARROLL SUTURE;  Surgeon: Heath Patterson MD;  Location: [facility];  Service:    • ECTROPION REPAIR Right 2017    Procedure: RT LOWER LID LESION EXCISION W/ ROTATIONAL FLAP, RT LOWER CHEEK EXCISION W/ ROTATIONAL FLAP, LT LOWER LID CICATRICAL ECTROPIAN REPAIR, FULL THICKNESS SKIN  FROST SUTURE;  Surgeon: Heath Patterson MD;  Location: [facility];  Service:    • ENDOSCOPY  01/15/2019    Elder   • GALLBLADDER SURGERY     • TONSILLECTOMY     • UPPER GASTROINTESTINAL ENDOSCOPY         Family History:   Family History   Problem Relation Age of Onset   • No Known Problems Mother    • No Known Problems Father    • Breast cancer Sister         30s   • Ovarian cancer Sister    • Melanoma Sister    • Malig Hyperthermia Neg Hx        Social History:   Social History     Socioeconomic History   • Marital status:    Tobacco Use   • Smoking status: Former Smoker     Packs/day: 0.50     Years: 20.00     Pack years: 10.00     Types: Cigarettes     Quit date:      Years since quittin.   • Smokeless tobacco: Never Used   • Tobacco comment: social smoker   Vaping Use   • Vaping Use: Never used   Substance and Sexual Activity   • Alcohol use: Never   • Drug use: No   • Sexual activity: Defer       Medications:     Current Outpatient Medications:   •  ALPRAZolam (XANAX) 0.5 MG tablet, , Disp: , Rfl:   •  amLODIPine (NORVASC) 2.5 MG tablet, Take 2.5 mg by mouth Daily., Disp: , Rfl:   •  calcium-vitamin D (Oscal 500/200 D-3) 500-200 MG-UNIT per tablet, Take 1 tablet by mouth 2 (Two) Times a Day., Disp: , Rfl:   •  estradiol (ESTRACE) 0.1 MG/GM vaginal cream, Apply 0.5g vaginally 2 times a week in evening, Disp: 42.5 g, Rfl: 2  •  folic acid (FOLVITE) 1 MG tablet, Take 1 mg by mouth Daily., Disp: ,  Rfl:   •  furosemide (LASIX) 40 MG tablet, Take 40 mg by mouth Daily As Needed (FOR SWELLING)., Disp: , Rfl:   •  gabapentin (NEURONTIN) 300 MG capsule, Take 300 mg by mouth 3 (Three) Times a Day., Disp: , Rfl:   •  insulin aspart (novoLOG FLEXPEN) 100 UNIT/ML solution pen-injector sc pen, Inject 10 Units under the skin into the appropriate area as directed 3 (Three) Times a Day With Meals. BREAKFAST AND DINNER, Disp: , Rfl:   •  insulin detemir (LEVEMIR FLEXTOUCH) 100 UNIT/ML injection, Inject 40 Units under the skin into the appropriate area as directed 2 (Two) Times a Day. BREAKFAST AND DINNER, Disp: , Rfl:   •  lisinopril (PRINIVIL,ZESTRIL) 2.5 MG tablet, Take 2.5 mg by mouth Daily., Disp: , Rfl:   •  metoprolol-hydrochlorothiazide (LOPRESSOR HCT) 50-25 MG per tablet, Take 1 tablet by mouth 2 (Two) Times a Day., Disp: , Rfl:   •  multivitamin with minerals (CENTRUM SILVER PO), Take 1 tablet by mouth Daily., Disp: , Rfl:   •  omeprazole (priLOSEC) 20 MG capsule, Take 20 mg by mouth Daily., Disp: , Rfl:   •  rOPINIRole (REQUIP) 0.25 MG tablet, Take 0.25 mg by mouth Every Night. Take 1 hour before bedtime., Disp: , Rfl:   •  rosuvastatin (CRESTOR) 20 MG tablet, Take 20 mg by mouth Daily., Disp: , Rfl:   •  sucralfate (CARAFATE) 1 g tablet, TAKE 1 TABLET BY MOUTH TWICE A DAY TAKE ON AN EMPTY STOMACH, Disp: , Rfl:   •  theophylline (UNIPHYL) 400 MG 24 hr tablet, Take 400 mg by mouth Daily., Disp: , Rfl:   •  levoFLOXacin (LEVAQUIN) 250 MG tablet, TAKE 2 TABLETS BY MOUTH EVERY DAY FOR 10 DAYS, Disp: , Rfl:   •  LORazepam (ATIVAN) 0.5 MG tablet, Take 0.5 mg by mouth 2 (Two) Times a Day., Disp: , Rfl:     Allergies:   Allergies   Allergen Reactions   • Codeine Nausea And Vomiting   • Morphine Nausea And Vomiting   • Ciprofloxacin Rash       Pain: (on a scale of 0-10)   Pain Score    02/14/22 1317   PainSc:   7       Advanced Care Plan: N   Quality of Life: 80 - Restricted Physical Activity    Objective     Physical  Exam:   Vital Signs:   Vitals:    02/14/22 1317   BP: 135/47   Pulse: 56   Resp: 16   Temp: 99.1 °F (37.3 °C)   TempSrc: Temporal   SpO2: 99%   Weight: 71.5 kg (157 lb 10.1 oz)   PainSc:   7     Body mass index is 27.06 kg/m².     Physical Exam  Constitutional:       General: She is not in acute distress.     Appearance: Normal appearance. She is normal weight. She is not toxic-appearing.   HENT:      Head: Normocephalic and atraumatic.      Mouth/Throat:      Mouth: Mucous membranes are moist.   Pulmonary:      Effort: Pulmonary effort is normal. No respiratory distress.   Abdominal:      General: There is no distension.      Palpations: Abdomen is soft.   Skin:     General: Skin is warm and dry.   Neurological:      General: No focal deficit present.      Mental Status: She is alert and oriented to person, place, and time.      Cranial Nerves: No cranial nerve deficit.      Gait: Gait normal.   Psychiatric:         Mood and Affect: Mood normal.         Behavior: Behavior normal.         Judgment: Judgment normal.         Results:   Radiographs: NM PET/CT Skull Base to Mid Thigh    Result Date: 1/31/2022    1. There is metabolic activity in the right hilum with maximum SUV of 4 and mild narrowing of the adjacent bronchi likely due toneoplasm given the history. 2. Focal activity in the left neck maximum SUV 3.9 may be vascular activity.  No definite lymph node is seen in this region.  3. Patchy areas of opacity are seen in the lungs including the right middle lobe, right upper lobe, and right lower lobe.  There is mild metabolic activity in the right upper lobe opacity maximum SUV 1.8.  This is more likely due to atelectasis or pneumonia.  Some of the could be postobstructive.  Superimposed neoplasm the right upper lobe difficult to exclude.  Recommend continued attention on follow-up. 4. No other abnormal activity seen.  Cellulitis or injection site seen in the lower abdominal wall.  There is aneurysms of the  aortic arch and abdominal aorta.  Recommend continued follow-up.  Moderate to severe atherosclerosis.     MAYNOR FOX MD       Electronically Signed and Approved By: MAYNOR FOX MD on 1/31/2022 at 15:09           I personally reviewed the PET/CT performed on January 31, 2022.  The pertinent findings are as above.    Pathology: I personally reviewed the pathology report from the procedure performed on December 23, 2021.  The pertinent findings are as above in HPI.    Labs:   WBC   Date Value Ref Range Status   12/19/2021 7.29 3.40 - 10.80 10*3/mm3 Final     Hemoglobin   Date Value Ref Range Status   12/19/2021 9.5 (L) 12.0 - 15.9 g/dL Final     Hematocrit   Date Value Ref Range Status   12/19/2021 31.4 (L) 34.0 - 46.6 % Final     Platelets   Date Value Ref Range Status   12/19/2021 193 140 - 450 10*3/mm3 Final         Assessment / Plan          Assessment/Plan:   Laury Palacios is a 89-year-old female with apparent stage II adenocarcinoma of the right lower lobe.  She does have some potential resultant narrowing of the right mainstem bronchus with no apparent pulmonary compromise.  She has no clinical or radiographic evidence of distant metastatic disease.  Her performance status is limited by advanced age, some level of dementia as well as COPD.  ECOG 1    I discussed the clinical, radiographic and pathologic findings to date with Ms. Palacios.  I explained the role of radiotherapy in the management of lung cancer, explaining that lung cancer is often treated with commendation of chemotherapy and radiation with the goal of cancer eradication.  I additionally explained that this is associated with both acute and chronic toxicities that may not only decrease her quality of life but also limit her life expectancy.  Given the proximity of the region of concern with the esophagus, Ms. Palacios is likely to experience a significant esophagitis with radiotherapy that may limit her nutritional intake resulting in  hospitalization and even death.  Given her performance status and advanced age I did not recommend external beam radiotherapy as her lung cancer is not currently resulting in any compromise of her activities of daily living.  She is not a candidate for concurrent chemoradiation and I would not deliver external beam radiation for prophylactic palliation as there is expected significant toxicity with this.  The patient's daughter was available for this consultation and agrees with this approach.  Ms. Palacios is also in agreement of course.  She was encouraged to contact me with any questions or concerns regarding her care.        Glynn Guillermo MD  Radiation Oncology  [facility]    This document has been signed by Glynn Guillermo MD on February 17, 2022 15:02 EST

## 2022-02-15 ENCOUNTER — TELEPHONE (OUTPATIENT)
Dept: NUTRITION | Facility: HOSPITAL | Age: 87
End: 2022-02-15

## 2022-02-15 NOTE — PROGRESS NOTES
"2/15/22:    Nutrition referral received 2/14/22 due to MST score of 3.  Pt seen for consult in radiation oncology 2/14.  Dx:  Lung Ca (SCC).  Tx plan undetermined at this time.  Per records, she has lost 20# in the past 2 months (11% in 2 months), which is considered significant.  Called pt today to discuss nutrition concerns/wt loss further.  Pt was confused during our conversation, asking more than once what the purpose of the phone call was.  She reports she only eats 2 meals per day, but denied recent wt loss and reports to be \"eating fine\".      Oncology RD remains available per protocol and will f/u with pt if she receives tx at our organization.  "

## 2022-02-23 NOTE — PROGRESS NOTES
Finally received urine sensitivity request. I left message on daughter's primary number that a medication was sent to pharmacy and to call office to verify received message. If calls, inform that I sent keflex to her pharmacy and to complete medication per directions. Thank you. (Just to document, low dose keflex due to ckd 4 for ecoli uti, resistant to gent.) Cimzia Counseling:  I discussed with the patient the risks of Cimzia including but not limited to immunosuppression, allergic reactions and infections.  The patient understands that monitoring is required including a PPD at baseline and must alert us or the primary physician if symptoms of infection or other concerning signs are noted.

## 2022-03-01 ENCOUNTER — TELEPHONE (OUTPATIENT)
Dept: RADIATION ONCOLOGY | Facility: HOSPITAL | Age: 87
End: 2022-03-01

## 2022-03-01 NOTE — TELEPHONE ENCOUNTER
Distress Screening Follow-Up    Diagnosis: Lung cancer    Date of Distress Screenin22  Location of Distress Screening: Rad onc  Distress Level: 6  Problems Indicated: Breathing, indigestion, pain, tingling of hands/feet    Comments: OSW attempted to contact pt via telephone to f/u in regards to identified distress. PHQ-9 completed during consult - score 2. Denies SI. Left pt a detailed VM, introducing myself and discussing OSW role/psychosocial services available. Pt will not be undergoing radiation tx with us at this time. Provided my direct number, encouraging OSW support remains available.    Services/Referrals Provided: (Attempted contact)

## 2022-03-11 LAB
CYTO UR: NORMAL
LAB AP CASE REPORT: NORMAL
LAB AP CLINICAL INFORMATION: NORMAL
LAB AP DIAGNOSIS COMMENT: NORMAL
PATH REPORT.ADDENDUM SPEC: NORMAL
PATH REPORT.FINAL DX SPEC: NORMAL
PATH REPORT.GROSS SPEC: NORMAL

## 2022-03-22 ENCOUNTER — OFFICE VISIT (OUTPATIENT)
Dept: UROLOGY | Facility: CLINIC | Age: 87
End: 2022-03-22

## 2022-03-22 VITALS
DIASTOLIC BLOOD PRESSURE: 41 MMHG | TEMPERATURE: 98 F | WEIGHT: 157.63 LBS | HEIGHT: 64 IN | BODY MASS INDEX: 26.91 KG/M2 | HEART RATE: 66 BPM | SYSTOLIC BLOOD PRESSURE: 99 MMHG

## 2022-03-22 DIAGNOSIS — N39.0 RECURRENT UTI: Primary | ICD-10-CM

## 2022-03-22 DIAGNOSIS — Z79.4 LONG TERM CURRENT USE OF INSULIN: ICD-10-CM

## 2022-03-22 DIAGNOSIS — J44.9 CHRONIC OBSTRUCTIVE PULMONARY DISEASE, UNSPECIFIED COPD TYPE: ICD-10-CM

## 2022-03-22 DIAGNOSIS — N39.43 URINARY INCONTINENCE, POST-VOID DRIBBLING: ICD-10-CM

## 2022-03-22 DIAGNOSIS — G89.29 OTHER CHRONIC PAIN: ICD-10-CM

## 2022-03-22 LAB
BILIRUB BLD-MCNC: NEGATIVE MG/DL
CLARITY, POC: CLEAR
COLOR UR: YELLOW
EXPIRATION DATE: ABNORMAL
GLUCOSE UR STRIP-MCNC: NEGATIVE MG/DL
KETONES UR QL: NEGATIVE
LEUKOCYTE EST, POC: ABNORMAL
Lab: ABNORMAL
NITRITE UR-MCNC: NEGATIVE MG/ML
PH UR: 5 [PH] (ref 5–8)
PROT UR STRIP-MCNC: NEGATIVE MG/DL
RBC # UR STRIP: NEGATIVE /UL
SP GR UR: 1.01 (ref 1–1.03)
UROBILINOGEN UR QL: NORMAL

## 2022-03-22 PROCEDURE — 87086 URINE CULTURE/COLONY COUNT: CPT | Performed by: NURSE PRACTITIONER

## 2022-03-22 PROCEDURE — 81003 URINALYSIS AUTO W/O SCOPE: CPT | Performed by: NURSE PRACTITIONER

## 2022-03-22 PROCEDURE — 99215 OFFICE O/P EST HI 40 MIN: CPT | Performed by: NURSE PRACTITIONER

## 2022-03-22 RX ORDER — PHENAZOPYRIDINE HYDROCHLORIDE 100 MG/1
100 TABLET, FILM COATED ORAL 2 TIMES DAILY WITH MEALS
Qty: 60 TABLET | Refills: 1 | Status: SHIPPED | OUTPATIENT
Start: 2022-03-22 | End: 2022-01-01 | Stop reason: SDUPTHER

## 2022-03-22 RX ORDER — ALBUTEROL SULFATE 90 UG/1
2 AEROSOL, METERED RESPIRATORY (INHALATION) EVERY 6 HOURS PRN
Qty: 18 G | Refills: 0 | Status: SHIPPED | OUTPATIENT
Start: 2022-03-22 | End: 2022-01-01 | Stop reason: SDUPTHER

## 2022-03-22 RX ORDER — METOPROLOL SUCCINATE 50 MG/1
50 TABLET, EXTENDED RELEASE ORAL 2 TIMES DAILY
COMMUNITY
Start: 2022-03-16

## 2022-03-22 RX ORDER — PERPHENAZINE 16 MG/1
TABLET, FILM COATED ORAL
Status: ON HOLD | COMMUNITY
Start: 2022-03-01 | End: 2023-01-01

## 2022-03-22 RX ORDER — CIPROFLOXACIN 500 MG/1
500 TABLET, FILM COATED ORAL 2 TIMES DAILY
COMMUNITY
Start: 2022-02-22 | End: 2022-03-22

## 2022-03-22 NOTE — PROGRESS NOTES
"Chief Complaint  No chief complaint on file.    Subjective          Laury Palacios 89 y.o. female presents to [facility] UROLOGY  Patient here for follow-up visit to discuss history of recurrent urinary tract infection and also questions regarding her recent diagnosis of lung cancer.  She is being followed by Dr. Mejia and has had recent PET scan.  She is accompanied by her family.  Patient is having some discomfort in the perineum for the past week and unsure if she may have a urinary tract infection.  She is using Estrace vaginal cream and OTC moisturizer for protection.  Post void dribbling which patient is unable to control and unsure if causing more irritation.  Denies any fever chills, or gross hematuria.  Presence of chronic low back pain related to her degenerative disc disease and she is on Tramadol and neurontin which is not controlling her pain and dislikes side effects of gi upset. She desires to participate in activities as much as possible and pain affects her mood and activity. Reports right foot swelling due to wound on her foot which is currently being monitored per podiatry. Left foot now healed.     Review of Systems   Constitutional: Negative for chills and fever.   Respiratory: Positive for shortness of breath and wheezing.    Genitourinary: Positive for dysuria. Negative for hematuria.   Musculoskeletal: Positive for arthralgias and back pain.   Neurological: Positive for weakness.      Objective   Vital Signs:   BP 99/41   Pulse 66   Temp 98 °F (36.7 °C)   Ht 162.6 cm (64\")   Wt 71.5 kg (157 lb 10.1 oz)   BMI 27.06 kg/m²      Past Surgical History:   Procedure Laterality Date   • APPENDECTOMY     • BRONCHOSCOPY N/A 12/23/2021    Procedure: BRONCHOSCOPY WITH ENDOBRONCHIAL ULTRASOUND, RIGHT LOWER LOBE WASHINGS, BIOPSIES, BAL;  Surgeon: Glynn Mejia DO;  Location: [facility] ENDOSCOPY;  Service: Pulmonary;  Laterality: N/A;  RIGHT LOWER LOBE ENDOBRONCHIAL MASS "   • CAROTID ENDARTERECTOMY      BILAT, 6 YEARS APART IN 1990S   • CATARACT EXTRACTION     • CHOLECYSTECTOMY     • COLONOSCOPY  01/15/2019    Elder   • CYSTOSCOPY     • ECTROPION REPAIR Left 06/27/2017    Procedure: LEFT LOWER LID CICATRICIAL ECTROPION  FULL THICKNESS SKIN GRAFT WITH CARROLL SUTURE;  Surgeon: Heath Patterson MD;  Location:  [facility];  Service:    • ECTROPION REPAIR Right 09/12/2017    Procedure: RT LOWER LID LESION EXCISION W/ ROTATIONAL FLAP, RT LOWER CHEEK EXCISION W/ ROTATIONAL FLAP, LT LOWER LID CICATRICAL ECTROPIAN REPAIR, FULL THICKNESS SKIN  FROST SUTURE;  Surgeon: Heath Patterson MD;  Location: [facility];  Service:    • ENDOSCOPY  01/15/2019    Elder   • TONSILLECTOMY     • UPPER GASTROINTESTINAL ENDOSCOPY          Physical Exam  Vitals and nursing note reviewed. Exam conducted with a chaperone present.   Constitutional:       General: She is awake. She is not in acute distress.     Appearance: She is well-groomed.      Comments: W/c with o2 n/c continuous. Generalized weakness   Cardiovascular:      Rate and Rhythm: Normal rate and regular rhythm.   Pulmonary:      Breath sounds: Wheezing and rhonchi present.      Comments: Wheezing throughout. Tracheal wheezing inspiratory and expiratory. Rhonchi right lower lobe  Abdominal:      Palpations: Abdomen is soft.      Tenderness: There is no abdominal tenderness. There is no guarding or rebound.   Musculoskeletal:      Right lower leg: Edema present.      Left lower leg: Edema present.   Skin:     General: Skin is warm and dry.   Neurological:      Mental Status: She is oriented to person, place, and time. Mental status is at baseline.      Motor: Weakness present.      Gait: Gait abnormal.   Psychiatric:         Behavior: Behavior normal. Behavior is cooperative.         Thought Content: Thought content normal.         Judgment: Judgment normal.        Result Review :   The following data was reviewed by: Shamika MALIK  PADMINI Pandey on 03/22/2022:     POC Urinalysis Dipstick, Automated (03/22/2022 09:28)  Few clusters of leukocytes and vaginal cell present HPF no bacteria seen per micro in office.   Data reviewed: Radiologic studies pet scan and Consultant notes pulmonology           Assessment and Plan    Diagnoses and all orders for this visit:    1. Recurrent UTI (Primary)- H/O cystitis without hematuria.  -     POC Urinalysis Dipstick, Automated  -     phenazopyridine (PYRIDIUM) 100 MG tablet; Take 1 tablet by mouth 2 (Two) Times a Day With Meals. As needed for burning. Take with food  Dispense: 60 tablet; Refill: 1  -     Urine Culture - Urine, Urine, Clean Catch    2. Chronic obstructive pulmonary disease, unspecified COPD type (HCC)  -     albuterol sulfate  (90 Base) MCG/ACT inhaler; Inhale 2 puffs Every 6 (Six) Hours As Needed for Wheezing or Shortness of Air for up to 30 days.  Dispense: 18 g; Refill: 0    3. Long term current use of insulin (HCC)    4. Other chronic pain    5. Urinary incontinence, post-void dribbling      I spent 55   minutes caring for Laury on this date of service. This time includes time spent by me in the following activities:preparing for the visit, reviewing tests, obtaining and/or reviewing a separately obtained history, performing a medically appropriate examination and/or evaluation , counseling and educating the patient/family/caregiver, ordering medications, tests, or procedures, documenting information in the medical record, independently interpreting results and communicating that information with the patient/family/caregiver and answered questions family members, discuss medications via telephone with daughter     Will send urine for culture. Schedule for gent bladder irrigation in office on Monday. Pyridium prn for urethral pain. Continue estrace vaginal cream for atrophic vaginitis. Albuterol inhaler prn until follow up with pulmonology/onc.  Recent diagnosis of lung cancer.  Questions answered and recommendation to discuss additional medication for chronic back pain. I think that would help her quality of  Life. Chronic back pain r/t DDD. Patient not helped and dislikes tramadol due to gi upset.She does not want anything that will cause too much sedation during the day.  She would like to consider use of medical marijuana, had tried thc edible x1 for pain once in past and very helpful. Patient and family report suggested possible hospice and going to contact . Patient still active and wishes maintain as much independence as possible. Prescription for [employer] Urine collection device provided as requested by patient's daughter via telephone. Relayed unsure if insurance cover and provided printout so they may contact company.   Follow Up   Return in about 1 week (around 3/29/2022) for schedule for gentamycin bladder irrigation on monday at 2.  Patient was given instructions and counseling regarding her condition or for health maintenance advice. Please see specific information pulled into the AVS if appropriate.     PADMINI Clinton

## 2022-03-23 LAB — BACTERIA SPEC AEROBE CULT: NO GROWTH

## 2022-03-23 NOTE — PROGRESS NOTES
Please notify patient that her urine culture did not grow an infection. It may just be the irritation as discussed. May need to contact her daughter's cell, since patient normally does not answer. It is up to  the patient is she still wishes to have the bladder irrigation or wait and see if pyridium helps the discomfort. Thank you.

## 2022-03-30 PROBLEM — R30.9 PAIN EMPTYING BLADDER: Status: ACTIVE | Noted: 2022-01-01

## 2022-03-30 PROBLEM — N95.2 POST-MENOPAUSAL ATROPHIC VAGINITIS: Status: ACTIVE | Noted: 2022-01-01

## 2022-03-30 PROBLEM — N30.90 CYSTITIS WITHOUT HEMATURIA: Status: ACTIVE | Noted: 2022-01-01

## 2022-03-30 NOTE — PROGRESS NOTES
Irrigation of Bladder    Date/Time: 3/30/2022 4:01 PM  Performed by: Shamika Pandey APRN  Authorized by: Shamika Pandey APRN         DX Recurrent uti and bladder pain  With  consent patient  prepped and drapped under sterile technique. Topical anesthetic with 2% vicous lidocaine 6cc inserted into meatal opening. Urethral catheterization performed without difficulty. 60cc yellow urine returned . Solution of sterile water 50cc and 160mg gentamicin was injected into the bladder via 14 fr straight catheter. Solution to remain in bladder for a minium of 1 hour or until next void. Patient tolerated well.

## 2022-04-07 NOTE — PROGRESS NOTES
Primary Care Provider  Jayne Zamora APRN   Referring Provider  No ref. provider found     Patient Complaint  COPD, Asthma, Follow-up (3 month follow up), and Cough        SUBJECTIVE     History of Presenting Illness  Laury Palacios is a 89 y.o. female who presents to [facility] for 3-month follow-up visit.  Patient is accompanied by leland.  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  She is under the care of  for oncology.  She had underwent a bronchoscopy 2021 which resulted in diagnosis of adenocarcinoma.  In addition patient had an MRI of the brain 2021 with findings of no acute intracranial abnormalities.  Patient states she did meet with Dr. Barrera regarding chemotherapy and radiation however patient states she is not interested in this therapy at this time secondary to risk associated with chemo radiation.  Patient states that Dr. Antunez has decreased seen her from 1 month to every 3 months now she states.  Patient states she would like to have an appointment with her PCP and with Dr. Sanabria her nephrologist.  Patient states she needs a new nebulizer machine at home she has had a machine at home but it is very old with broken tubing and the nebulizer solution has .  She states that was previously prescribed by her former pulmonologist Dr. Leon.     Patient is on oxygen at 2 L via nasal cannula.  She states she still has some episodes of shortness of air when she exerts herself of moderate severity but improves with rest and her oxygen.  In addition she states she does have some wheezing at times and will cough to clear.        Denies  headaches, chest pain, weight loss or hemoptysis. Denies fevers, chills and night sweats. Laury Palacios is able to perform ADLs without difficulties and denies any swollen glands/lymph nodes in the head or neck.     I have personally reviewed the review of  systems, past family, social, medical and surgical histories; and agree with their findings.     Review of Systems  Constitutional symptoms:  Denied complaints   Ear, nose, throat: Denied complaints  Cardiovascular:  Denied complaints  Respiratory: Shortness of air with exertion, wheezing, cough  Gastrointestinal: Denied complaints  Musculoskeletal: Denied complaints           Family History   Problem Relation Age of Onset   • No Known Problems Mother     • No Known Problems Father     • Breast cancer Sister           30s   • Ovarian cancer Sister     • Melanoma Sister     • Malig Hyperthermia Neg Hx           Social History   Social History            Socioeconomic History   • Marital status:    Tobacco Use   • Smoking status: Former Smoker       Packs/day: 0.50       Years: 20.00       Pack years: 10.00       Types: Cigarettes       Quit date:        Years since quittin.2   • Smokeless tobacco: Never Used   • Tobacco comment: social smoker   Vaping Use   • Vaping Use: Never used   Substance and Sexual Activity   • Alcohol use: Never   • Drug use: No   • Sexual activity: Defer            Medical History        Past Medical History:   Diagnosis Date   • Acid reflux     • Anemia     • Arthritis     • Asthma     • Bladder disorder     • Chronic renal failure     • Condition not found       Ulcer   • DDD (degenerative disc disease), lumbar     • Diabetes mellitus (HCC)       TYPE TWO   • Dysuria     • Ectropion of left eye     • Gastric ulcer     • Hemorrhoids 2018     grade II   • High blood pressure     • History of transfusion     • Hyperlipidemia     • Hypertension     • Limb pain     • Limb swelling     • Long-term current use of insulin for diabetes mellitus (HCC)     • Melanoma (HCC)       BACK   • Neck fracture (HCC)      C4C5 NO SURGERY   • Peripheral neuropathy     • Rectal bleeding     • Spinal stenosis     • Squamous cell carcinoma       FACE REMOVED   • Stroke (HCC)     • Stuttering      • TIA (transient ischemic attack)                   Immunization History   Administered Date(s) Administered   • COVID-19 (MODERNA) 1st, 2nd, 3rd Dose Only 02/25/2021, 02/25/2021, 02/25/2021, 03/17/2021, 03/17/2021, 03/17/2021   • COVID-19 (PFIZER) PURPLE CAP 02/09/2021, 02/09/2021, 02/09/2021, 03/02/2021, 03/02/2021, 03/02/2021, 12/02/2021   • Fluzone Split Quad (Multi-dose) 09/26/2014, 12/15/2020, 10/12/2021   • Influenza Quad Vaccine (Inpatient) 09/26/2014, 12/15/2020              Allergies   Allergen Reactions   • Codeine Nausea And Vomiting   • Morphine Nausea And Vomiting   • Ciprofloxacin Rash            Current Outpatient Medications:   •  albuterol sulfate  (90 Base) MCG/ACT inhaler, Inhale 2 puffs Every 6 (Six) Hours As Needed for Wheezing or Shortness of Air for up to 30 days., Disp: 18 g, Rfl: 0  •  amLODIPine (NORVASC) 2.5 MG tablet, Take 2.5 mg by mouth Daily., Disp: , Rfl:   •  calcium-vitamin D (OSCAL-500) 500-200 MG-UNIT per tablet, Take 1 tablet by mouth 2 (Two) Times a Day., Disp: , Rfl:   •  Contour Next Test test strip, USE AS DIRECTED IN VITRO 3 TIMES A DAY 90 DAYS, Disp: , Rfl:   •  estradiol (ESTRACE) 0.1 MG/GM vaginal cream, Apply 0.5g vaginally 2 times a week in evening, Disp: 42.5 g, Rfl: 5  •  folic acid (FOLVITE) 1 MG tablet, Take 1 mg by mouth Daily., Disp: , Rfl:   •  furosemide (LASIX) 40 MG tablet, Take 40 mg by mouth Daily As Needed (FOR SWELLING)., Disp: , Rfl:   •  gabapentin (NEURONTIN) 300 MG capsule, Take 300 mg by mouth 3 (Three) Times a Day., Disp: , Rfl:   •  insulin aspart (novoLOG FLEXPEN) 100 UNIT/ML solution pen-injector sc pen, Inject 10 Units under the skin into the appropriate area as directed 3 (Three) Times a Day With Meals. BREAKFAST AND DINNER, Disp: , Rfl:   •  insulin detemir (LEVEMIR) 100 UNIT/ML injection, Inject 40 Units under the skin into the appropriate area as directed 2 (Two) Times a Day. BREAKFAST AND DINNER, Disp: , Rfl:   •  ipratropium  "(ATROVENT) 0.03 % nasal spray, 2 sprays into the nostril(s) as directed by provider 4 (Four) Times a Day., Disp: , Rfl:   •  lisinopril (PRINIVIL,ZESTRIL) 2.5 MG tablet, Take 2.5 mg by mouth Daily., Disp: , Rfl:   •  LORazepam (ATIVAN) 0.5 MG tablet, Take 0.5 mg by mouth 2 (Two) Times a Day., Disp: , Rfl:   •  metoprolol succinate XL (TOPROL-XL) 50 MG 24 hr tablet, Take 50 mg by mouth 2 (Two) Times a Day., Disp: , Rfl:   •  metoprolol-hydrochlorothiazide (LOPRESSOR HCT) 50-25 MG per tablet, Take 1 tablet by mouth 2 (Two) Times a Day., Disp: , Rfl:   •  multivitamin with minerals tablet tablet, Take 1 tablet by mouth Daily., Disp: , Rfl:   •  mupirocin (BACTROBAN) 2 % ointment, , Disp: , Rfl:   •  omeprazole (priLOSEC) 20 MG capsule, Take 20 mg by mouth Daily., Disp: , Rfl:   •  phenazopyridine (PYRIDIUM) 100 MG tablet, Take 1 tablet by mouth 2 (Two) Times a Day With Meals. As needed for burning. Take with food, Disp: 30 tablet, Rfl: 3  •  rOPINIRole (REQUIP) 0.25 MG tablet, Take 0.25 mg by mouth Every Night. Take 1 hour before bedtime., Disp: , Rfl:   •  rosuvastatin (CRESTOR) 20 MG tablet, Take 20 mg by mouth Daily., Disp: , Rfl:   •  sucralfate (CARAFATE) 1 g tablet, TAKE 1 TABLET BY MOUTH TWICE A DAY TAKE ON AN EMPTY STOMACH, Disp: , Rfl:   •  theophylline (UNIPHYL) 400 MG 24 hr tablet, Take 400 mg by mouth Daily., Disp: , Rfl:   •  ipratropium-albuterol (DUO-NEB) 0.5-2.5 mg/3 ml nebulizer, Take 3 mL by nebulization 4 (Four) Times a Day As Needed for Wheezing., Disp: 360 mL, Rfl: 3     Current Facility-Administered Medications:   •  Lidocaine Viscous HCl (XYLOCAINE) 2 % solution 6 mL, 6 mL, Mouth/Throat, Once, Pandey, Shamika B, APRN         OBJECTIVE     Vital Signs   BP (!) 122/105 (BP Location: Left arm, Patient Position: Sitting, Cuff Size: Adult)   Pulse 61   Temp 97.4 °F (36.3 °C) (Tympanic)   Resp 16   Ht 162.6 cm (64\")   Wt 71.2 kg (157 lb)   SpO2 100% Comment: nasal cannula- 2 liters  BMI 26.95 " kg/m²      Physical Exam  Vital Signs Reviewed   WDWN, Alert, NAD.    HEENT:  PERRL, EOMI.  OP, nares clear  Neck:  Supple, no JVD, no thyromegaly  Chest:  good aeration, inspiratory wheezes upper lobes anterior and posterior, diminished breath sounds in the bases , no rhonchi no crackles noted no work of breathing noted  CV: RRR, no MGR, pulses 2+, equal.  Abd:  Soft, NT, ND, + BS, no HSM  EXT:  no clubbing, no cyanosis, no edema  Neuro:  A&Ox3, CN grossly intact, no focal deficits.  Skin: No rashes or lesions noted     Results Review  I have personally reviewed the diagnostic imaging including a PET scan from 1/31/2022 with the following:        Study Result     Narrative & Impression   PROCEDURE:  NM PET SKULL BASE TO MID THIGH     COMPARISON: [facility], CT, CT ABDOMEN PELVIS WO CONTRAST, 12/20/2021, 16:43.  [facility], CT, CT CHEST WO CONTRAST DIAGNOSTIC, 12/20/2021, 16:43.     INDICATIONS:  C34.31     TECHNIQUE:    After obtaining the patient's consent, F-18 FDG was administered intravenously.  PET/CT   imaging was performed from skull to thigh with multi-planar imaging without oral or intravenous   contrast material, using a dedicated integrated PET/CT scanner.       RADIONUCLIDE:     11.04 MCI   F18 FDG- I.V.  LABS:                          Blood Glucose 131 mg/dl  UPTAKE TIME:        66 mins  MEDIASTINAL BACKGROUND UPTAKE:  2.8     FINDINGS:          No abnormal activity in the neck.  There is mild focal activity in the left neck maximum SUV 3.9   thought to be likely vascular.  No lymph nodes seen.  There is mild activity in the lower abdominal   wall which corresponds to skin thickening and previous injection sites or cellulitis.  Aneurysm   again seen of the aortic arch measuring up to about 3.4 centimeters.  There is abnormal activity in   the right hilar region with mild narrowing of the right middle lobe and lower lobe bronchi.  The   area of metabolic activity measures  up to about 2 centimeters.  Maximum SUV is about 4.      Redemonstration of opacity in the right upper lobe which is somewhat patchy.  Portion of this is   mildly metabolically active maximum SUV 1.8.  There is denser opacity in the right lower lobe.    Mild patchy opacity in the right middle lobe and there is mild opacity in the right lower lobe.     There is mild renal atrophy on the left.  Diverticulosis.  Moderate stool seen in the colon.  No   adenopathy is seen.  Aortic aneurysm measuring up to about 3.6 centimeters again seen.  Left common   iliac artery measures about 1.7 centimeters.  Moderate to severe atherosclerotic changes are seen.    Scattered sclerotic foci again seen in the sacrum and left iliac bone with moderate multilevel   degenerative changes.  There is metabolic activity in the cervical spine and lumbar spine likely   degenerative..     IMPRESSION:                 1. There is metabolic activity in the right hilum with maximum SUV of 4 and mild narrowing of the   adjacent bronchi likely due toneoplasm given the history.  2. Focal activity in the left neck maximum SUV 3.9 may be vascular activity.  No definite lymph   node is seen in this region.    3. Patchy areas of opacity are seen in the lungs including the right middle lobe, right upper lobe,   and right lower lobe.  There is mild metabolic activity in the right upper lobe opacity maximum SUV   1.8.  This is more likely due to atelectasis or pneumonia.  Some of the could be postobstructive.    Superimposed neoplasm the right upper lobe difficult to exclude.  Recommend continued attention on   follow-up.  4. No other abnormal activity seen.  Cellulitis or injection site seen in the lower abdominal wall.    There is aneurysms of the aortic arch and abdominal aorta.  Recommend continued follow-up.    Moderate to severe atherosclerosis.     MAYNOR FOX MD         Electronically Signed and Approved By: MAYNOR FOX MD on 1/31/2022 at  15:09           ASSESSMENT & PLAN         Patient Active Problem List   Diagnosis   • Osteoarthritis   • Asthma   • Bladder disorder   • Chronic obstructive pulmonary disease (HCC)   • Depression   • Diabetes mellitus (HCC)   • Diabetic renal disease (HCC)   • Hemorrhoids   • Generalized anxiety disorder   • Gastro-esophageal reflux disease with esophagitis   • Edema   • Dysuria   • Post-menopausal bleeding   • Neuropathy   • Mixed hyperlipidemia   • Melanoma (HCC)   • Long term current use of insulin (HCC)   • Limb swelling   • Hypothyroid   • Hypertension   • Vitamin D deficiency   • Ulcer disease   • Type 2 diabetes mellitus with hyperglycemia (HCC)   • Stuttering   • Stroke (HCC)   • Stage 3 chronic kidney disease (HCC)   • Restless legs syndrome   • Rectal bleeding   • S/P carotid endarterectomy   • Acute cystitis without hematuria   • Cellulitis and abscess of foot   • Cellulitis   • Hilar mass   • Hyperkalemia   • Neuralgia   • Proteinuria   • Retinal disorder   • Recurrent UTI   • Other chronic pain   • Post-void dribbling   • Post-menopausal atrophic vaginitis   • Cystitis without hematuria   • Pain emptying bladder         Diagnoses and all orders for this visit:     1. Adenocarcinoma of right lung (HCC) (Primary)  -     XR Chest PA & Lateral; Future  -     Home Nebulizer Accessories  -     ipratropium-albuterol (DUO-NEB) 0.5-2.5 mg/3 ml nebulizer; Take 3 mL by nebulization 4 (Four) Times a Day As Needed for Wheezing.  Dispense: 360 mL; Refill: 3  -     albuterol sulfate  (90 Base) MCG/ACT inhaler; Inhale 2 puffs Every 6 (Six) Hours As Needed for Wheezing or Shortness of Air for up to 30 days.  Dispense: 18 g; Refill: 0  -     Ambulatory Referral to Nephrology  -     Ambulatory Referral to Internal Medicine     2. Chronic obstructive pulmonary disease, unspecified COPD type (HCC)  -     XR Chest PA & Lateral; Future  -     Home Nebulizer Accessories  -     ipratropium-albuterol (DUO-NEB) 0.5-2.5  mg/3 ml nebulizer; Take 3 mL by nebulization 4 (Four) Times a Day As Needed for Wheezing.  Dispense: 360 mL; Refill: 3  -     albuterol sulfate  (90 Base) MCG/ACT inhaler; Inhale 2 puffs Every 6 (Six) Hours As Needed for Wheezing or Shortness of Air for up to 30 days.  Dispense: 18 g; Refill: 0  -     Ambulatory Referral to Nephrology  -     Ambulatory Referral to Internal Medicine     3. Hilar mass  -     XR Chest PA & Lateral; Future  -     Home Nebulizer Accessories  -     ipratropium-albuterol (DUO-NEB) 0.5-2.5 mg/3 ml nebulizer; Take 3 mL by nebulization 4 (Four) Times a Day As Needed for Wheezing.  Dispense: 360 mL; Refill: 3  -     albuterol sulfate  (90 Base) MCG/ACT inhaler; Inhale 2 puffs Every 6 (Six) Hours As Needed for Wheezing or Shortness of Air for up to 30 days.  Dispense: 18 g; Refill: 0  -     Ambulatory Referral to Nephrology  -     Ambulatory Referral to Internal Medicine     4. Stage 3 chronic kidney disease, unspecified whether stage 3a or 3b CKD (HCC)  -     XR Chest PA & Lateral; Future  -     Home Nebulizer Accessories  -     ipratropium-albuterol (DUO-NEB) 0.5-2.5 mg/3 ml nebulizer; Take 3 mL by nebulization 4 (Four) Times a Day As Needed for Wheezing.  Dispense: 360 mL; Refill: 3  -     albuterol sulfate  (90 Base) MCG/ACT inhaler; Inhale 2 puffs Every 6 (Six) Hours As Needed for Wheezing or Shortness of Air for up to 30 days.  Dispense: 18 g; Refill: 0  -     Ambulatory Referral to Nephrology  -     Ambulatory Referral to Internal Medicine        Plan:  I spent 45 minutes caring for Ms. Palacios on this date of service. This time includes time spent by me in the following activities:preparing for the visit, reviewing tests, obtaining and/or reviewing a separately obtained history, performing a medically appropriate examination and/or evaluation , counseling and educating the patient/family/caregiver, ordering medications, tests, or procedures, documenting information  in the medical record, independently interpreting results and communicating that information with the patient/family/caregiver and answered questions family members, discuss medications.   Will proceed with ordering a chest x-ray at this time.  In addition we will order wheelchair, nebulizer machine.  In addition referral to patient's family practitioner Jayne Zamora NP and Dr. Sanabria her nephrologist.  To follow-up in 1 month or sooner if needed.     Smoking status: Former quit in 1992  Vaccination status: Up-to-date per patient on Covid and influenza, unsure of pneumonia vaccination  Medications personally reviewed     Follow Up  Return in about 4 weeks (around 5/5/2022).     Patient was given instructions and counseling regarding her condition or for health maintenance advice. Please see specific information pulled into the AVS if appropriate.

## 2022-04-07 NOTE — PROGRESS NOTES
Primary Care Provider  Jayne Zamora APRN   Referring Provider  No ref. provider found    Patient Complaint  COPD, Asthma, Follow-up (3 month follow up), and Cough      SUBJECTIVE    History of Presenting Illness  Laury Palacios is a 89 y.o. female who presents to [facility] for 3-month follow-up visit.  Patient is accompanied by family member today.  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  She is under the care of  for oncology.  She had underwent a bronchoscopy 2021 which resulted in diagnosis of adenocarcinoma.  In addition patient had an MRI of the brain 2021 with findings of no acute intracranial abnormalities.         Denies dyspnea, coughing, wheezing, headaches, chest pain, weight loss or hemoptysis. Denies fevers, chills and night sweats. Laury Palacios is able to perform ADLs without difficulties and denies any swollen glands/lymph nodes in the head or neck.    I have personally reviewed the review of systems, past family, social, medical and surgical histories; and agree with their findings.    Review of Systems  Constitutional symptoms:  Denied complaints   Ear, nose, throat: Denied complaints  Cardiovascular:  Denied complaints  Respiratory: Denied complaints  Gastrointestinal: Denied complaints  Musculoskeletal: Denied complaints    Family History   Problem Relation Age of Onset   • No Known Problems Mother    • No Known Problems Father    • Breast cancer Sister         30s   • Ovarian cancer Sister    • Melanoma Sister    • Malig Hyperthermia Neg Hx         Social History     Socioeconomic History   • Marital status:    Tobacco Use   • Smoking status: Former Smoker     Packs/day: 0.50     Years: 20.00     Pack years: 10.00     Types: Cigarettes     Quit date:      Years since quittin.2   • Smokeless tobacco: Never Used   • Tobacco comment: social smoker   Vaping Use   • Vaping Use:  Never used   Substance and Sexual Activity   • Alcohol use: Never   • Drug use: No   • Sexual activity: Defer        Past Medical History:   Diagnosis Date   • Acid reflux    • Anemia    • Arthritis    • Asthma    • Bladder disorder    • Chronic renal failure    • Condition not found     Ulcer   • DDD (degenerative disc disease), lumbar    • Diabetes mellitus (HCC)     TYPE TWO   • Dysuria    • Ectropion of left eye    • Gastric ulcer    • Hemorrhoids 2018    grade II   • High blood pressure    • History of transfusion    • Hyperlipidemia    • Hypertension    • Limb pain    • Limb swelling    • Long-term current use of insulin for diabetes mellitus (HCC)    • Melanoma (HCC)     BACK   • Neck fracture (HCC) 1986    C4C5 NO SURGERY   • Peripheral neuropathy    • Rectal bleeding    • Spinal stenosis    • Squamous cell carcinoma     FACE REMOVED   • Stroke (HCC)    • Stuttering    • TIA (transient ischemic attack)         Immunization History   Administered Date(s) Administered   • COVID-19 (MODERNA) 1st, 2nd, 3rd Dose Only 02/25/2021, 02/25/2021, 02/25/2021, 03/17/2021, 03/17/2021, 03/17/2021   • COVID-19 (PFIZER) PURPLE CAP 02/09/2021, 02/09/2021, 02/09/2021, 03/02/2021, 03/02/2021, 03/02/2021, 12/02/2021   • Fluzone Split Quad (Multi-dose) 09/26/2014, 12/15/2020, 10/12/2021   • Influenza Quad Vaccine (Inpatient) 09/26/2014, 12/15/2020       Allergies   Allergen Reactions   • Codeine Nausea And Vomiting   • Morphine Nausea And Vomiting   • Ciprofloxacin Rash          Current Outpatient Medications:   •  albuterol sulfate  (90 Base) MCG/ACT inhaler, Inhale 2 puffs Every 6 (Six) Hours As Needed for Wheezing or Shortness of Air for up to 30 days., Disp: 18 g, Rfl: 0  •  amLODIPine (NORVASC) 2.5 MG tablet, Take 2.5 mg by mouth Daily., Disp: , Rfl:   •  calcium-vitamin D (OSCAL-500) 500-200 MG-UNIT per tablet, Take 1 tablet by mouth 2 (Two) Times a Day., Disp: , Rfl:   •  Contour Next Test test strip, USE AS  DIRECTED IN VITRO 3 TIMES A DAY 90 DAYS, Disp: , Rfl:   •  estradiol (ESTRACE) 0.1 MG/GM vaginal cream, Apply 0.5g vaginally 2 times a week in evening, Disp: 42.5 g, Rfl: 5  •  folic acid (FOLVITE) 1 MG tablet, Take 1 mg by mouth Daily., Disp: , Rfl:   •  furosemide (LASIX) 40 MG tablet, Take 40 mg by mouth Daily As Needed (FOR SWELLING)., Disp: , Rfl:   •  gabapentin (NEURONTIN) 300 MG capsule, Take 300 mg by mouth 3 (Three) Times a Day., Disp: , Rfl:   •  insulin aspart (novoLOG FLEXPEN) 100 UNIT/ML solution pen-injector sc pen, Inject 10 Units under the skin into the appropriate area as directed 3 (Three) Times a Day With Meals. BREAKFAST AND DINNER, Disp: , Rfl:   •  insulin detemir (LEVEMIR) 100 UNIT/ML injection, Inject 40 Units under the skin into the appropriate area as directed 2 (Two) Times a Day. BREAKFAST AND DINNER, Disp: , Rfl:   •  ipratropium (ATROVENT) 0.03 % nasal spray, 2 sprays into the nostril(s) as directed by provider 4 (Four) Times a Day., Disp: , Rfl:   •  lisinopril (PRINIVIL,ZESTRIL) 2.5 MG tablet, Take 2.5 mg by mouth Daily., Disp: , Rfl:   •  LORazepam (ATIVAN) 0.5 MG tablet, Take 0.5 mg by mouth 2 (Two) Times a Day., Disp: , Rfl:   •  metoprolol succinate XL (TOPROL-XL) 50 MG 24 hr tablet, Take 50 mg by mouth 2 (Two) Times a Day., Disp: , Rfl:   •  metoprolol-hydrochlorothiazide (LOPRESSOR HCT) 50-25 MG per tablet, Take 1 tablet by mouth 2 (Two) Times a Day., Disp: , Rfl:   •  multivitamin with minerals tablet tablet, Take 1 tablet by mouth Daily., Disp: , Rfl:   •  mupirocin (BACTROBAN) 2 % ointment, , Disp: , Rfl:   •  omeprazole (priLOSEC) 20 MG capsule, Take 20 mg by mouth Daily., Disp: , Rfl:   •  phenazopyridine (PYRIDIUM) 100 MG tablet, Take 1 tablet by mouth 2 (Two) Times a Day With Meals. As needed for burning. Take with food, Disp: 30 tablet, Rfl: 3  •  rOPINIRole (REQUIP) 0.25 MG tablet, Take 0.25 mg by mouth Every Night. Take 1 hour before bedtime., Disp: , Rfl:   •   "rosuvastatin (CRESTOR) 20 MG tablet, Take 20 mg by mouth Daily., Disp: , Rfl:   •  sucralfate (CARAFATE) 1 g tablet, TAKE 1 TABLET BY MOUTH TWICE A DAY TAKE ON AN EMPTY STOMACH, Disp: , Rfl:   •  theophylline (UNIPHYL) 400 MG 24 hr tablet, Take 400 mg by mouth Daily., Disp: , Rfl:   •  ipratropium-albuterol (DUO-NEB) 0.5-2.5 mg/3 ml nebulizer, Take 3 mL by nebulization 4 (Four) Times a Day As Needed for Wheezing., Disp: 360 mL, Rfl: 3    Current Facility-Administered Medications:   •  Lidocaine Viscous HCl (XYLOCAINE) 2 % solution 6 mL, 6 mL, Mouth/Throat, Once, Shamika Pandey, APRN       OBJECTIVE    Vital Signs   BP (!) 122/105 (BP Location: Left arm, Patient Position: Sitting, Cuff Size: Adult)   Pulse 61   Temp 97.4 °F (36.3 °C) (Tympanic)   Resp 16   Ht 162.6 cm (64\")   Wt 71.2 kg (157 lb)   SpO2 100% Comment: nasal cannula- 2 liters  BMI 26.95 kg/m²     Physical Exam  Vital Signs Reviewed   WDWN, Alert, NAD.    HEENT:  PERRL, EOMI.  OP, nares clear  Neck:  Supple, no JVD, no thyromegaly  Chest:  good aeration, clear to auscultation bilaterally, tympanic to percussion bilaterally, no work of breathing noted  CV: RRR, no MGR, pulses 2+, equal.  Abd:  Soft, NT, ND, + BS, no HSM  EXT:  no clubbing, no cyanosis, no edema  Neuro:  A&Ox3, CN grossly intact, no focal deficits.  Skin: No rashes or lesions noted    Results Review  I have personally reviewed the diagnostic imaging including a PET scan from 1/31/2022 with the following:      Study Result    Narrative & Impression   PROCEDURE:  NM PET SKULL BASE TO MID THIGH     COMPARISON: [facility], CT, CT ABDOMEN PELVIS WO CONTRAST, 12/20/2021, 16:43.  [facility], CT, CT CHEST WO CONTRAST DIAGNOSTIC, 12/20/2021, 16:43.     INDICATIONS:  C34.31     TECHNIQUE:    After obtaining the patient's consent, F-18 FDG was administered intravenously.  PET/CT   imaging was performed from skull to thigh with multi-planar imaging without oral " or intravenous   contrast material, using a dedicated integrated PET/CT scanner.       RADIONUCLIDE:     11.04 MCI   F18 FDG- I.V.  LABS:                          Blood Glucose 131 mg/dl  UPTAKE TIME:        66 mins  MEDIASTINAL BACKGROUND UPTAKE:  2.8     FINDINGS:          No abnormal activity in the neck.  There is mild focal activity in the left neck maximum SUV 3.9   thought to be likely vascular.  No lymph nodes seen.  There is mild activity in the lower abdominal   wall which corresponds to skin thickening and previous injection sites or cellulitis.  Aneurysm   again seen of the aortic arch measuring up to about 3.4 centimeters.  There is abnormal activity in   the right hilar region with mild narrowing of the right middle lobe and lower lobe bronchi.  The   area of metabolic activity measures up to about 2 centimeters.  Maximum SUV is about 4.      Redemonstration of opacity in the right upper lobe which is somewhat patchy.  Portion of this is   mildly metabolically active maximum SUV 1.8.  There is denser opacity in the right lower lobe.    Mild patchy opacity in the right middle lobe and there is mild opacity in the right lower lobe.     There is mild renal atrophy on the left.  Diverticulosis.  Moderate stool seen in the colon.  No   adenopathy is seen.  Aortic aneurysm measuring up to about 3.6 centimeters again seen.  Left common   iliac artery measures about 1.7 centimeters.  Moderate to severe atherosclerotic changes are seen.    Scattered sclerotic foci again seen in the sacrum and left iliac bone with moderate multilevel   degenerative changes.  There is metabolic activity in the cervical spine and lumbar spine likely   degenerative..     IMPRESSION:                 1. There is metabolic activity in the right hilum with maximum SUV of 4 and mild narrowing of the   adjacent bronchi likely due toneoplasm given the history.  2. Focal activity in the left neck maximum SUV 3.9 may be vascular activity.   No definite lymph   node is seen in this region.    3. Patchy areas of opacity are seen in the lungs including the right middle lobe, right upper lobe,   and right lower lobe.  There is mild metabolic activity in the right upper lobe opacity maximum SUV   1.8.  This is more likely due to atelectasis or pneumonia.  Some of the could be postobstructive.    Superimposed neoplasm the right upper lobe difficult to exclude.  Recommend continued attention on   follow-up.  4. No other abnormal activity seen.  Cellulitis or injection site seen in the lower abdominal wall.    There is aneurysms of the aortic arch and abdominal aorta.  Recommend continued follow-up.    Moderate to severe atherosclerosis.     MAYNOR FOX MD         Electronically Signed and Approved By: MAYNOR FOX MD on 1/31/2022 at 15:09         ASSESSMENT & PLAN    Patient Active Problem List   Diagnosis   • Osteoarthritis   • Asthma   • Bladder disorder   • Chronic obstructive pulmonary disease (HCC)   • Depression   • Diabetes mellitus (HCC)   • Diabetic renal disease (HCC)   • Hemorrhoids   • Generalized anxiety disorder   • Gastro-esophageal reflux disease with esophagitis   • Edema   • Dysuria   • Post-menopausal bleeding   • Neuropathy   • Mixed hyperlipidemia   • Melanoma (HCC)   • Long term current use of insulin (HCC)   • Limb swelling   • Hypothyroid   • Hypertension   • Vitamin D deficiency   • Ulcer disease   • Type 2 diabetes mellitus with hyperglycemia (HCC)   • Stuttering   • Stroke (HCC)   • Stage 3 chronic kidney disease (HCC)   • Restless legs syndrome   • Rectal bleeding   • S/P carotid endarterectomy   • Acute cystitis without hematuria   • Cellulitis and abscess of foot   • Cellulitis   • Hilar mass   • Hyperkalemia   • Neuralgia   • Proteinuria   • Retinal disorder   • Recurrent UTI   • Other chronic pain   • Post-void dribbling   • Post-menopausal atrophic vaginitis   • Cystitis without hematuria   • Pain emptying bladder        Diagnoses and all orders for this visit:    1. Adenocarcinoma of right lung (HCC) (Primary)  -     XR Chest PA & Lateral; Future  -     Home Nebulizer Accessories  -     ipratropium-albuterol (DUO-NEB) 0.5-2.5 mg/3 ml nebulizer; Take 3 mL by nebulization 4 (Four) Times a Day As Needed for Wheezing.  Dispense: 360 mL; Refill: 3  -     albuterol sulfate  (90 Base) MCG/ACT inhaler; Inhale 2 puffs Every 6 (Six) Hours As Needed for Wheezing or Shortness of Air for up to 30 days.  Dispense: 18 g; Refill: 0  -     Ambulatory Referral to Nephrology  -     Ambulatory Referral to Internal Medicine    2. Chronic obstructive pulmonary disease, unspecified COPD type (HCC)  -     XR Chest PA & Lateral; Future  -     Home Nebulizer Accessories  -     ipratropium-albuterol (DUO-NEB) 0.5-2.5 mg/3 ml nebulizer; Take 3 mL by nebulization 4 (Four) Times a Day As Needed for Wheezing.  Dispense: 360 mL; Refill: 3  -     albuterol sulfate  (90 Base) MCG/ACT inhaler; Inhale 2 puffs Every 6 (Six) Hours As Needed for Wheezing or Shortness of Air for up to 30 days.  Dispense: 18 g; Refill: 0  -     Ambulatory Referral to Nephrology  -     Ambulatory Referral to Internal Medicine    3. Hilar mass  -     XR Chest PA & Lateral; Future  -     Home Nebulizer Accessories  -     ipratropium-albuterol (DUO-NEB) 0.5-2.5 mg/3 ml nebulizer; Take 3 mL by nebulization 4 (Four) Times a Day As Needed for Wheezing.  Dispense: 360 mL; Refill: 3  -     albuterol sulfate  (90 Base) MCG/ACT inhaler; Inhale 2 puffs Every 6 (Six) Hours As Needed for Wheezing or Shortness of Air for up to 30 days.  Dispense: 18 g; Refill: 0  -     Ambulatory Referral to Nephrology  -     Ambulatory Referral to Internal Medicine    4. Stage 3 chronic kidney disease, unspecified whether stage 3a or 3b CKD (HCC)  -     XR Chest PA & Lateral; Future  -     Home Nebulizer Accessories  -     ipratropium-albuterol (DUO-NEB) 0.5-2.5 mg/3 ml nebulizer; Take 3 mL  by nebulization 4 (Four) Times a Day As Needed for Wheezing.  Dispense: 360 mL; Refill: 3  -     albuterol sulfate  (90 Base) MCG/ACT inhaler; Inhale 2 puffs Every 6 (Six) Hours As Needed for Wheezing or Shortness of Air for up to 30 days.  Dispense: 18 g; Refill: 0  -     Ambulatory Referral to Nephrology  -     Ambulatory Referral to Internal Medicine      Plan:  I spent 45 minutes caring for Ms. Palacios on this date of service. This time includes time spent by me in the following activities:preparing for the visit, reviewing tests, obtaining and/or reviewing a separately obtained history, performing a medically appropriate examination and/or evaluation , counseling and educating the patient/family/caregiver, ordering medications, tests, or procedures, documenting information in the medical record, independently interpreting results and communicating that information with the patient/family/caregiver and answered questions family members, discuss medications.     Smoking status: Former quit in 1992  Vaccination status: Up-to-date per patient on Covid and influenza, unsure of pneumonia vaccination  Medications personally reviewed    Follow Up  Return in about 4 weeks (around 5/5/2022).    Patient was given instructions and counseling regarding her condition or for health maintenance advice. Please see specific information pulled into the AVS if appropriate.

## 2022-04-11 NOTE — PROGRESS NOTES
Please let patient know her CXR does show the right lung mass of which she is aware that she has lung cancer.  Thank you, Dolores

## 2022-04-27 NOTE — PROGRESS NOTES
"Chief Complaint  Follow-up    Subjective          Laury Palacios 89 y.o. female presents to [facility] UROLOGY  Patient here for follow up of recurrent uti. She had been doing relatively well and last week began having bladder pain. Usually has intermittently urethral discomfort. Described today as further into the bladder and more intense when voiding. Azo not helping this time.Missed visit here last week. Denies fever or chills.Unsure if needing to resume bladder treatment. Last treatment 3 weeks ago and straight cath sample showed no infection. Today urine is very cloudy and concentration. Consuming 3 bottles of water per day. Appointment with Dr Jefferson reported as scheduled for June regarding her ckd.    Review of Systems   Constitutional: Negative for chills and fever.   Cardiovascular: Negative for chest pain.   Genitourinary: Positive for dysuria. Negative for hematuria.   Musculoskeletal: Positive for arthralgias. Back pain: chronic DDD    Psychiatric/Behavioral: Positive for decreased concentration.      Objective   Vital Signs:   /74   Pulse 79   Temp 98.4 °F (36.9 °C)   Ht 162.6 cm (64\")   Wt 71.2 kg (157 lb)   BMI 26.95 kg/m²      Past Surgical History:   Procedure Laterality Date   • APPENDECTOMY     • BRONCHOSCOPY N/A 12/23/2021    Procedure: BRONCHOSCOPY WITH ENDOBRONCHIAL ULTRASOUND, RIGHT LOWER LOBE WASHINGS, BIOPSIES, BAL;  Surgeon: Glynn Mejia DO;  Location: [facility] ENDOSCOPY;  Service: Pulmonary;  Laterality: N/A;  RIGHT LOWER LOBE ENDOBRONCHIAL MASS   • CAROTID ENDARTERECTOMY      BILAT, 6 YEARS APART IN 1990S   • CATARACT EXTRACTION     • CHOLECYSTECTOMY     • COLONOSCOPY  01/15/2019    [facility]   • CYSTOSCOPY     • ECTROPION REPAIR Left 06/27/2017    Procedure: LEFT LOWER LID CICATRICIAL ECTROPION  FULL THICKNESS SKIN GRAFT WITH CARROLL SUTURE;  Surgeon: Heath Patterson MD;  [place]  [facility] [place] [facility] [place]:    • ECTROPION REPAIR Right 09/12/2017    " Procedure: RT LOWER LID LESION EXCISION W/ ROTATIONAL FLAP, RT LOWER CHEEK EXCISION W/ ROTATIONAL FLAP, LT LOWER LID CICATRICAL ECTROPIAN REPAIR, FULL THICKNESS SKIN  FROST SUTURE;  Surgeon: Heath Patterson MD;  Location: [facility] [place];  Service:    • ENDOSCOPY  01/15/2019    [facility]   • TONSILLECTOMY     • UPPER GASTROINTESTINAL ENDOSCOPY     Office Visit with Shamika Pandey APRN (03/22/2022)       Physical Exam  Vitals and nursing note reviewed.   Constitutional:       General: She is not in acute distress.     Appearance: She is well-developed and well-groomed. She is not ill-appearing.      Comments: Ambulating with some difficulty with use of cane.    Cardiovascular:      Rate and Rhythm: Normal rate and regular rhythm.   Pulmonary:      Effort: No respiratory distress.      Comments: Lungs sounds clear except faint wheeze left lower lobe. 02 sat room air 96% while sitting.  Abdominal:      Palpations: Abdomen is soft.   Musculoskeletal:      Right lower leg: Edema present.      Left lower leg: Edema present.   Skin:     General: Skin is warm and dry.   Neurological:      General: No focal deficit present.      Mental Status: She is alert and oriented to person, place, and time.      Gait: Gait abnormal.   Psychiatric:         Mood and Affect: Mood normal.         Behavior: Behavior is cooperative.         Thought Content: Thought content normal.         Judgment: Judgment normal.      Comments: forgetful        Result Review :        POC Urinalysis Dipstick, Automated (04/27/2022 13:07)              Assessment and Plan    Diagnoses and all orders for this visit:    1. Recurrent UTI (Primary)  -     POC Urinalysis Dipstick, Automated    2. Post-menopausal atrophic vaginitis    3. Pain emptying bladder    urine sent for culture. Patient unable to stay for gentamycin bladder treatment and agreeable to come in tomorrow. Continue estrace vaginal cream peasize to meatus 3 times a week prn. otc moisture  barrier externally. Nystatin cream prn. Due to chronicity of uti and renal disease, avoiding oral and IV antibiotics for uncomplicated uti. Continues to follow with pulmonology for newly diagnosed lung CA.     Follow Up   No follow-ups on file.  Patient was given instructions and counseling regarding her condition or for health maintenance advice. Please see specific information pulled into the AVS if appropriate.     Shamika Pandey, APRN

## 2022-04-28 NOTE — PROGRESS NOTES
Procedure      Dx: Recurrent uti  Irrigation of Bladder    Date/Time: 4/28/2022 3:26 PM  Performed by: Shamika Pandey APRN  Authorized by: Shamika Pandey APRN   Local anesthesia used: yes    Anesthesia:  Local anesthesia used: yes  Local Anesthetic: topical anesthetic  Anesthetic total: 6 (viscous lidocaine 2%) mL    Sedation:  Patient sedated: no              Patient was placed in lithotomy position and we scrubbed the external genitalia with Hibiclens. 6ml of viscous lidocaine instilled into urethra . Then Using 12 [place] red rubber catheter the bladder was drained of approximately 10cc urine.  We instilled solution of 160 mg of gentamicin  with 50 mL of sterile water in the urinary bladder.  Patient tolerated the procedure well. Urine culture pending sensitivity. Plans for resuming weekly bladder irrigations discussed with patient and daughter. Will add bladder cocktail with gent next time. Patient with  symptoms of IC along with uti.    05-Jul-2018

## 2022-05-03 NOTE — PROGRESS NOTES
Procedure   Procedures     Bladder instillation  DX: Chronic recurrent uti.    After prepped and draped Urethral catheterization was performed without difficulty.Approximately 20cc urine in bladder emptied via  12 [place] of [place] red rubber catheter.  Heparin 20,000 units, Marcaine 0.5% 10 mL, Solu-Cortef 100 mg, sodium bicarbonate 8.4% 15 mL, gentamicin 80 mg, viscous lidocaine 2% 12 cc was mixed and instilled into the urinary bladder.  35 mL of sodium bicarbonate was discarded.  Patient is to leave the solution remaining in bladder for a minimum of 1 hour or until next void.

## 2022-05-05 NOTE — PROGRESS NOTES
Primary Care Provider  Jayne Zamora APRN   Referring Provider  No ref. provider found    Patient Complaint  Follow-up (4 week follow up/chest ct results), COPD, Wheezing, and Shortness of Breath      SUBJECTIVE    History of Presenting Illness  Laury Palacios is a pleasant 89 y.o. female who presents to [facility] for 4-week follow-up.  Patient presents with her granddaughter today following up on CT scan.  Patient is requesting a new nebulizer machine interstices not working.  In addition patient is requesting a standard wheelchair due to not having strength or stamina to move around as she would like to.  Patient does have oxygen for when she gets short of air.  She does have episodes of dyspnea with exertion and will use her oxygen and rest and recovers easily.  At present time Ms. Palacios does not have any coughing, wheezing, headaches, chest pain, weight loss or hemoptysis. Denies fevers, chills and night sweats. Laury Palacios is able to perform ADLs without difficulties and denies any swollen glands/lymph nodes in the head or neck.    I have personally reviewed the review of systems, past family, social, medical and surgical histories; and agree with their findings.    Review of Systems   Constitutional: Negative.    HENT: Negative.    Eyes: Negative.    Respiratory: Positive for shortness of breath.    Cardiovascular: Negative.    Musculoskeletal: Negative.    Skin: Negative.         Family History   Problem Relation Age of Onset   • No Known Problems Mother    • No Known Problems Father    • Breast cancer Sister         30s   • Ovarian cancer Sister    • Melanoma Sister    • Malig Hyperthermia Neg Hx         Social History     Socioeconomic History   • Marital status:    Tobacco Use   • Smoking status: Former Smoker     Packs/day: 0.50     Years: 20.00     Pack years: 10.00     Types: Cigarettes     Quit date:      Years since quittin.3    • Smokeless tobacco: Never Used   • Tobacco comment: social smoker   Vaping Use   • Vaping Use: Never used   Substance and Sexual Activity   • Alcohol use: Never   • Drug use: No   • Sexual activity: Defer        Past Medical History:   Diagnosis Date   • Acid reflux    • Anemia    • Arthritis    • Asthma    • Bladder disorder    • Chronic renal failure    • Condition not found     Ulcer   • DDD (degenerative disc disease), lumbar    • Diabetes mellitus (HCC)     TYPE TWO   • Dysuria    • Ectropion of left eye    • Gastric ulcer    • Hemorrhoids 2018    grade II   • High blood pressure    • History of transfusion    • Hyperlipidemia    • Hypertension    • Limb pain    • Limb swelling    • Long-term current use of insulin for diabetes mellitus (HCC)    • Melanoma (HCC)     BACK   • Neck fracture (HCC) 1986    C4C5 NO SURGERY   • Peripheral neuropathy    • Rectal bleeding    • Spinal stenosis    • Squamous cell carcinoma     FACE REMOVED   • Stroke (HCC)    • Stuttering    • TIA (transient ischemic attack)         Immunization History   Administered Date(s) Administered   • COVID-19 (MODERNA) 1st, 2nd, 3rd Dose Only 02/25/2021, 02/25/2021, 02/25/2021, 03/17/2021, 03/17/2021, 03/17/2021   • COVID-19 (PFIZER) PURPLE CAP 02/09/2021, 02/09/2021, 02/09/2021, 03/02/2021, 03/02/2021, 03/02/2021, 12/02/2021   • Fluzone Split Quad (Multi-dose) 09/26/2014, 12/15/2020, 10/12/2021   • Influenza Quad Vaccine (Inpatient) 09/26/2014, 12/15/2020       Allergies   Allergen Reactions   • Codeine Nausea And Vomiting   • Morphine Nausea And Vomiting   • Ciprofloxacin Rash          Current Outpatient Medications:   •  albuterol sulfate  (90 Base) MCG/ACT inhaler, Inhale 2 puffs Every 6 (Six) Hours As Needed for Wheezing or Shortness of Air for up to 30 days., Disp: 18 g, Rfl: 5  •  amLODIPine (NORVASC) 2.5 MG tablet, Take 2.5 mg by mouth Daily., Disp: , Rfl:   •  BD Pen Needle Joi 2nd Gen 32G X 4 MM misc, , Disp: , Rfl:   •   calcium-vitamin D (OSCAL-500) 500-200 MG-UNIT per tablet, Take 1 tablet by mouth 2 (Two) Times a Day., Disp: , Rfl:   •  Contour Next Test test strip, USE AS DIRECTED IN VITRO 3 TIMES A DAY 90 DAYS, Disp: , Rfl:   •  estradiol (ESTRACE) 0.1 MG/GM vaginal cream, Apply 0.5g vaginally 2 times a week in evening, Disp: 42.5 g, Rfl: 5  •  folic acid (FOLVITE) 1 MG tablet, Take 1 mg by mouth Daily., Disp: , Rfl:   •  furosemide (LASIX) 40 MG tablet, Take 40 mg by mouth Daily As Needed (FOR SWELLING)., Disp: , Rfl:   •  gabapentin (NEURONTIN) 300 MG capsule, Take 300 mg by mouth 3 (Three) Times a Day., Disp: , Rfl:   •  insulin aspart (novoLOG FLEXPEN) 100 UNIT/ML solution pen-injector sc pen, Inject 10 Units under the skin into the appropriate area as directed 3 (Three) Times a Day With Meals. BREAKFAST AND DINNER, Disp: , Rfl:   •  insulin detemir (LEVEMIR) 100 UNIT/ML injection, Inject 40 Units under the skin into the appropriate area as directed 2 (Two) Times a Day. BREAKFAST AND DINNER, Disp: , Rfl:   •  ipratropium (ATROVENT) 0.03 % nasal spray, 2 sprays into the nostril(s) as directed by provider 4 (Four) Times a Day., Disp: , Rfl:   •  ipratropium-albuterol (DUO-NEB) 0.5-2.5 mg/3 ml nebulizer, Take 3 mL by nebulization 4 (Four) Times a Day As Needed for Wheezing., Disp: 360 mL, Rfl: 5  •  lisinopril (PRINIVIL,ZESTRIL) 2.5 MG tablet, Take 2.5 mg by mouth Daily., Disp: , Rfl:   •  LORazepam (ATIVAN) 0.5 MG tablet, Take 0.5 mg by mouth 2 (Two) Times a Day., Disp: , Rfl:   •  metoprolol succinate XL (TOPROL-XL) 50 MG 24 hr tablet, Take 50 mg by mouth 2 (Two) Times a Day., Disp: , Rfl:   •  metoprolol-hydrochlorothiazide (LOPRESSOR HCT) 50-25 MG per tablet, Take 1 tablet by mouth 2 (Two) Times a Day., Disp: , Rfl:   •  multivitamin with minerals tablet tablet, Take 1 tablet by mouth Daily., Disp: , Rfl:   •  mupirocin (BACTROBAN) 2 % ointment, , Disp: , Rfl:   •  omeprazole (priLOSEC) 20 MG capsule, Take 20 mg by mouth  "Daily., Disp: , Rfl:   •  phenazopyridine (PYRIDIUM) 100 MG tablet, Take 1 tablet by mouth 2 (Two) Times a Day With Meals. As needed for burning. Take with food, Disp: 30 tablet, Rfl: 3  •  rOPINIRole (REQUIP) 0.25 MG tablet, Take 0.25 mg by mouth Every Night. Take 1 hour before bedtime., Disp: , Rfl:   •  rosuvastatin (CRESTOR) 20 MG tablet, Take 20 mg by mouth Daily., Disp: , Rfl:   •  sucralfate (CARAFATE) 1 g tablet, TAKE 1 TABLET BY MOUTH TWICE A DAY TAKE ON AN EMPTY STOMACH, Disp: , Rfl:   •  theophylline (UNIPHYL) 400 MG 24 hr tablet, Take 400 mg by mouth Daily., Disp: , Rfl:     Current Facility-Administered Medications:   •  gentamicin (GARAMYCIN) injection 80 mg, 80 mg, Intravesical, Once, Shamika Pandey APRN  •  gentamicin (GARAMYCIN) injection 80 mg, 80 mg, Intravesical, Once, Shamika Pandey APRN  •  heparin (porcine) 20,000 Units, hydrocortisone sodium succinate (Solu-CORTEF) 100 mg, sodium bicarbonate 8.4 % 15 mL, Lidocaine Viscous HCl (XYLOCAINE) 2 % 12 mL irrigation, , Intracatheter, Once, Shamika Pandey APRN  •  Lidocaine Viscous HCl (XYLOCAINE) 2 % solution 6 mL, 6 mL, Mouth/Throat, Once, Shamika Pandey APRN  •  Lidocaine Viscous HCl (XYLOCAINE) 2 % solution 6 mL, 6 mL, Mouth/Throat, Once, Shamika Pandey APRN       OBJECTIVE    Vital Signs   /48 (BP Location: Left arm, Patient Position: Sitting, Cuff Size: Adult)   Pulse 68   Temp 98 °F (36.7 °C) (Tympanic)   Resp 20   Ht 162.6 cm (64\")   Wt 69.3 kg (152 lb 11.2 oz)   SpO2 99% Comment: room air  BMI 26.21 kg/m²     Physical Exam  Vitals reviewed.   Constitutional:       Appearance: Normal appearance.   HENT:      Head: Normocephalic and atraumatic.      Nose: Nose normal.   Eyes:      Extraocular Movements: Extraocular movements intact.      Conjunctiva/sclera: Conjunctivae normal.      Pupils: Pupils are equal, round, and reactive to light.   Cardiovascular:      Rate and Rhythm: Normal rate and regular " rhythm.      Pulses: Normal pulses.      Heart sounds: Normal heart sounds.   Pulmonary:      Effort: Pulmonary effort is normal.      Breath sounds: Normal breath sounds.   Abdominal:      General: Bowel sounds are normal.   Musculoskeletal:         General: Normal range of motion.      Cervical back: Normal range of motion and neck supple.   Skin:     General: Skin is warm and dry.   Neurological:      General: No focal deficit present.      Mental Status: She is alert and oriented to person, place, and time.   Psychiatric:         Mood and Affect: Mood normal.         Behavior: Behavior normal.          Results Review  I have personally reviewed the chest x-ray from 4/8/2022 and CT scan without contrast of chest 5/4/2022 with the following:    Study Result    Narrative & Impression   PROCEDURE:  XR CHEST PA AND LATERAL     COMPARISON: [facility], PET, NM PET/CT SKULL BASE TO MID THIGH, 1/31/2022, 10:03.    [facility], CR, XR CHEST 1 VW, 12/18/2021, 11:30.     INDICATIONS:  LUNG CANCER FOLLOWUP     FINDINGS:          Two plain film images of the chest are compared to a PET scan performed on 1/31/2022.  Today's   study reveals that there is a focal area of increased density located in the posterior lateral   aspect of the right middle lobe lung measuring 5.7 cm by 2.4 cm in size of uncertain etiology might   be caused by infiltrate or mass.  Clinical correlation would be helpful.  The heart mediastinum are   normal.  There are bilateral diffuse increased markings consistent with chronic lung disease no   evidence of mass or adenopathy in the mediastinum or in the right or left pulmonary sabina.     IMPRESSION:                 1. 5.7 cm x 2.4 cm in size focal area of abnormal density in the posterior lateral aspect the right   middle lobe lung might be caused by infiltrate or mass.  CT of the chest would be helpful to   further evaluate this finding if clinically indicated.  2. Chronic lung  disease            REAGAN CARDENAS MD         Electronically Signed and Approved By: REAGAN CARDENAS MD on 4/08/2022 at 16:56      Study Result    Narrative & Impression   PROCEDURE:  CT CHEST WO CONTRAST DIAGNOSTIC     COMPARISON: [facility], PET, NM PET/CT SKULL BASE TO MID THIGH, 1/31/2022, 10:03.    [facility], CT, CT CHEST WO CONTRAST DIAGNOSTIC, 12/20/2021, 16:43.  [facility], CT, CT ABDOMEN PELVIS WO CONTRAST, 12/20/2021, 16:43.     INDICATIONS:  FOLLOW UP LUNG CA. PT HAS TAKEN NO TREATMENTS.     TECHNIQUE:    CT images were created without the administration of contrast material.       PROTOCOL:     Standard imaging protocol performed                 RADIATION:      DLP: 283mGy*cm               Automated exposure control was utilized to minimize radiation dose.      FINDINGS:          The visualized soft tissue structures at the base of the neck including the thyroid appear within   normal limits.  There is no lower cervical or axillary adenopathy by size criteria.     The heart size is normal.  There is no pericardial effusion.  There is stable focal aneurysm   involving the aortic arch.  There is coronary and aortic atherosclerotic calcification.  The main   pulmonary artery appears normal in caliber.  There are partially calcified right paratracheal,   subcarinal, and right hilar lymph nodes likely related to chronic granulomatous disease.  There is   stable fullness of the right hilar region with narrowing of the central right middle lobe and right   lower lobe airways.     There is worsening consolidation and volume loss within the right middle lobe likely related to the   centralized bronchial narrowing compared to the prior examination.  There is mildly improved   aeration of the right lower lobe without progression of volume loss.  There is some areas of   tree-in-bud nodularity within the periphery of the right upper lobe as well as an irregular    ground-glass nodule measuring 9 mm which appears new from the prior examination and is best seen on   image 37. There is no pleural effusion or pneumothorax. .      The esophagus is normal in course and caliber.  Visualized portions of the upper abdomen   demonstrate asymmetric atrophy of the left kidney with respect to the right.  There is mild   dilation of the right renal pelvis.  There are multilevel degenerative changes of the thoracic   spine.  No suspicious lytic or sclerotic osseous lesion within the thorax.     IMPRESSION:                 1. Increasing right middle lobe volume loss likely related to central bronchial wall thickening   involving the right middle and right lower lobe airways.  The right lower lobe has mildly improved   aeration when compared to the prior examination.  2. Similar appearing fullness of the right hilum with likely underlying hilar mass compatible with   positive pathology from bronchoscopy.  3. Irregular ground-glass nodule within the inferior right upper lobe measuring 9 mm, new from   prior exam.              TANNA KAISER MD         Electronically Signed and Approved By: TANNA KAISER MD on 5/05/2022 at 9:01            ASSESSMENT & PLAN    Patient Active Problem List   Diagnosis   • Osteoarthritis   • Asthma   • Bladder disorder   • Chronic obstructive pulmonary disease (HCC)   • Depression   • Diabetes mellitus (HCC)   • Diabetic renal disease (HCC)   • Hemorrhoids   • Generalized anxiety disorder   • Gastro-esophageal reflux disease with esophagitis   • Edema   • Dysuria   • Post-menopausal bleeding   • Neuropathy   • Mixed hyperlipidemia   • Melanoma (HCC)   • Long term current use of insulin (HCC)   • Limb swelling   • Hypothyroid   • Hypertension   • Vitamin D deficiency   • Ulcer disease   • Type 2 diabetes mellitus with hyperglycemia (HCC)   • Stuttering   • Stroke (HCC)   • Stage 3 chronic kidney disease (HCC)   • Restless legs syndrome   • Rectal bleeding   •  S/P carotid endarterectomy   • Acute cystitis without hematuria   • Cellulitis and abscess of foot   • Cellulitis   • Hilar mass   • Hyperkalemia   • Neuralgia   • Proteinuria   • Retinal disorder   • Recurrent UTI   • Other chronic pain   • Post-void dribbling   • Post-menopausal atrophic vaginitis   • Cystitis without hematuria   • Pain emptying bladder       Diagnoses and all orders for this visit:    1. Adenocarcinoma of right lung (HCC)  -     ipratropium-albuterol (DUO-NEB) 0.5-2.5 mg/3 ml nebulizer; Take 3 mL by nebulization 4 (Four) Times a Day As Needed for Wheezing.  Dispense: 360 mL; Refill: 5  -     albuterol sulfate  (90 Base) MCG/ACT inhaler; Inhale 2 puffs Every 6 (Six) Hours As Needed for Wheezing or Shortness of Air for up to 30 days.  Dispense: 18 g; Refill: 5  -     Standard Wheelchair    2. Chronic obstructive pulmonary disease, unspecified COPD type (HCC)  -     ipratropium-albuterol (DUO-NEB) 0.5-2.5 mg/3 ml nebulizer; Take 3 mL by nebulization 4 (Four) Times a Day As Needed for Wheezing.  Dispense: 360 mL; Refill: 5  -     albuterol sulfate  (90 Base) MCG/ACT inhaler; Inhale 2 puffs Every 6 (Six) Hours As Needed for Wheezing or Shortness of Air for up to 30 days.  Dispense: 18 g; Refill: 5  -     Standard Wheelchair    3. Hilar mass  -     ipratropium-albuterol (DUO-NEB) 0.5-2.5 mg/3 ml nebulizer; Take 3 mL by nebulization 4 (Four) Times a Day As Needed for Wheezing.  Dispense: 360 mL; Refill: 5  -     albuterol sulfate  (90 Base) MCG/ACT inhaler; Inhale 2 puffs Every 6 (Six) Hours As Needed for Wheezing or Shortness of Air for up to 30 days.  Dispense: 18 g; Refill: 5  -     Standard Wheelchair    4. Stage 3 chronic kidney disease, unspecified whether stage 3a or 3b CKD ([facility])  -     ipratropium-albuterol (DUO-NEB) 0.5-2.5 mg/3 ml nebulizer; Take 3 mL by nebulization 4 (Four) Times a Day As Needed for Wheezing.  Dispense: 360 mL; Refill: 5  -     albuterol sulfate   (90 Base) MCG/ACT inhaler; Inhale 2 puffs Every 6 (Six) Hours As Needed for Wheezing or Shortness of Air for up to 30 days.  Dispense: 18 g; Refill: 5  -     Standard Wheelchair      Plan:  At this time patient does request no further intervention or treatment including referral to Dr. Barrera.  Reviewed with patient results of CT scan.     Smoking status: Former  Vaccination status: Up to date  Medications personally reviewed    Follow Up  Return in about 3 months (around 8/5/2022).    Patient was given instructions and counseling regarding her condition or for health maintenance advice. Please see specific information pulled into the AVS if appropriate.

## 2022-05-10 NOTE — PROGRESS NOTES
Procedure   Procedures       Bladder instillation  DX: Chronic recurrent uti.     After prepped and draped Urethral catheterization was performed without difficulty.Approximately 10cc urine in bladder emptied via  12 Taiwanese red rubber catheter.  Heparin 20,000 units, Marcaine 0.5% 10 mL, Solu-Cortef 100 mg, sodium bicarbonate 8.4% 15 mL, gentamicin 80 mg, viscous lidocaine 2% 12 cc was mixed and instilled into the urinary bladder.  35 mL of sodium bicarbonate was discarded.  Patient is to leave the solution remaining in bladder for a minimum of 1 hour or until next void.     IM injection of 80mg gent right glute administered x 1    Patient instructed to return tomorrow for 2nd  IM injection which should be sufficient to treat symptomatic uti. Cmp reviewed results and culture positive Ecoli.

## 2022-05-17 PROBLEM — L03.116 CELLULITIS OF LEFT THIGH: Status: ACTIVE | Noted: 2022-01-01

## 2022-05-17 PROBLEM — S70.362A INSECT BITE OF LEFT THIGH: Status: ACTIVE | Noted: 2022-01-01
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
## 2022-05-17 PROBLEM — N39.0 RECURRENT UTI: Status: ACTIVE | Noted: 2022-01-01

## 2022-05-17 NOTE — PROGRESS NOTES
Procedure   Irrigation of Bladder    Date/Time: 5/17/2022 4:20 PM  Performed by: Shamika Pandey APRN  Authorized by: Shamika Pandey APRN   Preparation: Patient was prepped and draped in the usual sterile fashion.  Local anesthesia used: yes    Anesthesia:  Local anesthesia used: yes  Local Anesthetic: topical anesthetic  Anesthetic total: 6 mL    Sedation:  Patient sedated: no    Patient tolerance: patient tolerated the procedure well with no immediate complications             DX: CHRONIC/and RECURRENT UTI  Chief Complaint   Patient presents with   • Urinary Tract Infection     .procdoc  Urethral catheterization was performed without difficulty.  12 Albanian red rubber catheter was used.  Heparin 20,000 units, Marcaine 0.5% 10 mL, Solu-Cortef 100 mg, sodium bicarbonate 8.4% 15 mL, gentamicin 80 mg, viscous lidocaine 2% 12 cc was mixed and instilled into the urinary bladder.  35 mL of sodium bicarbonate was discarded.  Patient is to leave the solution remaining in bladder for a minimum of 1 hour or until next void.

## 2022-05-17 NOTE — PROGRESS NOTES
Procedure   Procedures       DX:   Chief Complaint   Patient presents with   • Urinary Tract Infection       Urethral catheterization was performed without difficulty.  12 [place] Islander red rubber catheter was used.  Heparin 20,000 units, Marcaine 0.5% 10 mL, Solu-Cortef 100 mg, sodium bicarbonate 8.4% 15 mL, gentamicin 80 mg, viscous lidocaine 2% 12 cc was mixed and instilled into the urinary bladder.  35 mL of sodium bicarbonate was discarded.  Patient is to leave the solution remaining in bladder for a minimum of 1 hour or until next void.

## 2022-05-17 NOTE — PROGRESS NOTES
Procedure   Irrigation of Bladder    Date/Time: 5/17/2022 4:29 PM  Performed by: Shamika Pandey APRN  Authorized by: Shamika Pandey APRN   Patient tolerance: patient tolerated the procedure well with no immediate complications             DX: Recurrent and Chronic     Urethral catheterization was performed without difficulty.  12 Welsh red rubber catheter was used.  Heparin 20,000 units, Marcaine 0.5% 10 mL, Solu-Cortef 100 mg, sodium bicarbonate 8.4% 15 mL, gentamicin 80 mg, viscous lidocaine 2% 12 cc was mixed and instilled into the urinary bladder.  35 mL of sodium bicarbonate was discarded.  Patient is to leave the solution remaining in bladder for a minimum of 1 hour or until next void.    Patient also with local cellulitis of left inner thigh. Previous tick bite and patient has been scratching the site. Irregular area of erythema 11cm x 5cm. Doxycycline contraindicated due to diabetes.   Patient was advised to apply benadryl cream last visit and today states family giving her oral benadryl since could not find cream. It has not helped the itching and only caused sedation.  Verbally informed nephew to obtain the benadryl cream. Keflex escribed to pharmacy adjusted in consideration of renal function. Follow up next week for next bladder treatment.

## 2022-06-01 PROBLEM — C34.91 ADENOCARCINOMA, LUNG, RIGHT (HCC): Status: ACTIVE | Noted: 2022-01-01

## 2022-06-06 NOTE — PROGRESS NOTES
Procedure   Procedures   Bladder Cocktail Treatment    DX: Recurrent UTI Bladder Pain      Urethral catheterization was performed without difficulty.  12 Syriac red rubber catheter was used.  Heparin 20,000 units, Marcaine 0.5% 10 mL, Solu-Cortef 100 mg, gentamicin 80 mg, viscous lidocaine 2% 12 cc was mixed and instilled into the urinary bladder. 15cc sterile water substituted for sodium bicarbonate.  Patient is to leave the solution remaining in bladder for a minimum of 1 hour or until next void.

## 2022-06-20 PROBLEM — R32 ABSENCE OF BLADDER CONTINENCE: Status: ACTIVE | Noted: 2022-01-01

## 2022-06-20 PROBLEM — B37.2 CANDIDAL SKIN INFECTION: Status: ACTIVE | Noted: 2022-01-01

## 2022-06-20 NOTE — PROGRESS NOTES
Procedure   Procedures     Bladder Treatment   DX: Recurrent uti and painful voiding.      Urethral catheterization was performed without difficulty.  12 Samoan red rubber catheter was used.  Heparin 20,000 units, Marcaine 0.5% 10 mL, Solu-Cortef 100 mg, sodium bicarbonate 8.4% 15 mL, gentamicin 80 mg, viscous lidocaine 2% 12 cc was mixed and instilled into the urinary bladder.  35 mL of sodium bicarbonate was discarded.  Patient is to leave the solution remaining in bladder for a minimum of 1 hour or until next void.    Patient provided with gemtesa last procedure to help with uncontrollable urge incontinence. Unsure if working but when questioned on episodes of incontinence and need to change attends, she acknowledged medication must be working. Have not had to change attends during the day and no recent urinary incontinence.

## 2022-06-28 NOTE — PROGRESS NOTES
Primary Care Provider  Jayne Zamora APRN   Referring Provider  No ref. provider found    Patient Complaint  Follow-up, COPD, and Asthma      SUBJECTIVE    History of Presenting Illness  Laury Palacios is a pleasant 89 y.o. female who presents to [facility] for follow-up appointment.  I last saw the patient 5/5/2022.  Patient had came in because she had a diagnosis of adenocarcinoma of right lung.  Patient is planning a trip to [place] with her family wanted to stop by here to get herself checked out to make sure her lungs are good and getting refills on her medications.  Patient is under the care of .  Patient continues to be on albuterol and DuoNeb's at this time.  Patient states she does have some shortness of air with exertion.  She does have oxygen which she uses however she presents to the room without her oxygen on at this time and her saturation is 96%.  Patient is here with her granddaughter today.    Denies coughing, wheezing, headaches, chest pain, weight loss or hemoptysis. Denies fevers, chills and night sweats. Laury Palacios is able to perform ADLs without difficulties and denies any swollen glands/lymph nodes in the head or neck.    I have personally reviewed the review of systems, past family, social, medical and surgical histories; and agree with their findings.    Review of Systems   Constitutional: Negative.    HENT: Negative.    Eyes: Negative.    Respiratory: Positive for shortness of breath. Negative for cough, wheezing and stridor.    Cardiovascular: Negative.  Negative for chest pain, palpitations and leg swelling.   Musculoskeletal: Negative.    Skin: Negative.         Family History   Problem Relation Age of Onset   • No Known Problems Mother    • No Known Problems Father    • Breast cancer Sister         30s   • Ovarian cancer Sister    • Melanoma Sister    • Malig Hyperthermia Neg Hx         Social History     Socioeconomic  History   • Marital status:    Tobacco Use   • Smoking status: Former Smoker     Packs/day: 0.50     Years: 20.00     Pack years: 10.00     Types: Cigarettes     Quit date:      Years since quittin.5   • Smokeless tobacco: Never Used   • Tobacco comment: social smoker   Vaping Use   • Vaping Use: Never used   Substance and Sexual Activity   • Alcohol use: Never   • Drug use: No   • Sexual activity: Defer        Past Medical History:   Diagnosis Date   • Acid reflux    • Anemia    • Arthritis    • Asthma    • Bladder disorder    • Chronic renal failure    • Condition not found     Ulcer   • DDD (degenerative disc disease), lumbar    • Diabetes mellitus (HCC)     TYPE TWO   • Dysuria    • Ectropion of left eye    • Gastric ulcer    • Hemorrhoids 2018    grade II   • High blood pressure    • History of transfusion    • Hyperlipidemia    • Hypertension    • Limb pain    • Limb swelling    • Long-term current use of insulin for diabetes mellitus (HCC)    • Melanoma (HCC)     BACK   • Neck fracture (HCC)     C4C5 NO SURGERY   • Peripheral neuropathy    • Rectal bleeding    • Spinal stenosis    • Squamous cell carcinoma     FACE REMOVED   • Stroke (HCC)    • Stuttering    • TIA (transient ischemic attack)         Immunization History   Administered Date(s) Administered   • COVID-19 (MODERNA) 1st, 2nd, 3rd Dose Only 2021, 2021, 2021, 2021, 2021, 2021   • COVID-19 (PFIZER) PURPLE CAP 2021, 2021, 2021, 2021, 2021, 2021, 2021   • Fluzone Split Quad (Multi-dose) 2014, 12/15/2020, 10/12/2021   • Influenza Quad Vaccine (Inpatient) 2014, 12/15/2020       Allergies   Allergen Reactions   • Codeine Nausea And Vomiting   • Morphine Nausea And Vomiting   • Ciprofloxacin Rash          Current Outpatient Medications:   •  albuterol sulfate  (90 Base) MCG/ACT inhaler, Inhale 2 puffs Every 4 (Four) Hours As Needed for  Wheezing., Disp: 18 g, Rfl: 3  •  amLODIPine (NORVASC) 2.5 MG tablet, Take 2.5 mg by mouth Daily., Disp: , Rfl:   •  azithromycin (ZITHROMAX) 250 MG tablet, , Disp: , Rfl:   •  bacitracin 500 UNIT/GM ointment, 1 APPLICATION EXTERNALLY ONCE A DAY 7 DAY(S), Disp: , Rfl:   •  BD Pen Needle Joi 2nd Gen 32G X 4 MM misc, , Disp: , Rfl:   •  hydrocortisone 2.5 % cream, APPLY TO AFFECTED AREA 4 TIMES A DAY, Disp: , Rfl:   •  insulin aspart (novoLOG FLEXPEN) 100 UNIT/ML solution pen-injector sc pen, Inject 10 Units under the skin into the appropriate area as directed 3 (Three) Times a Day With Meals. BREAKFAST AND DINNER, Disp: , Rfl:   •  insulin detemir (LEVEMIR) 100 UNIT/ML injection, Inject 40 Units under the skin into the appropriate area as directed 2 (Two) Times a Day. BREAKFAST AND DINNER, Disp: , Rfl:   •  ipratropium (ATROVENT) 0.03 % nasal spray, 2 sprays into the nostril(s) as directed by provider 4 (Four) Times a Day., Disp: , Rfl:   •  ipratropium-albuterol (DUO-NEB) 0.5-2.5 mg/3 ml nebulizer, Take 3 mL by nebulization 4 (Four) Times a Day As Needed for Wheezing., Disp: 360 mL, Rfl: 5  •  ketoconazole (NIZORAL) 2 % shampoo, MASSAGE INTO SCALP 15-20 MINUTES BEFORE SHOWER, LET SIT, THEN RINSE., Disp: , Rfl:   •  lisinopril (PRINIVIL,ZESTRIL) 2.5 MG tablet, Take 2.5 mg by mouth Daily., Disp: , Rfl:   •  LORazepam (ATIVAN) 0.5 MG tablet, Take 0.5 mg by mouth 2 (Two) Times a Day., Disp: , Rfl:   •  metoprolol succinate XL (TOPROL-XL) 50 MG 24 hr tablet, Take 50 mg by mouth 2 (Two) Times a Day., Disp: , Rfl:   •  metoprolol-hydrochlorothiazide (LOPRESSOR HCT) 50-25 MG per tablet, Take 1 tablet by mouth 2 (Two) Times a Day., Disp: , Rfl:   •  metroNIDAZOLE (METROGEL) 0.75 % gel, , Disp: , Rfl:   •  multivitamin with minerals tablet tablet, Take 1 tablet by mouth Daily., Disp: , Rfl:   •  mupirocin (BACTROBAN) 2 % ointment, , Disp: , Rfl:   •  Nirmatrelvir & Ritonavir (Paxlovid) 20 x 150 MG & 10 x 100MG tablet therapy  pack tablet,  3 Tab, Oral, Tab, BID, Nirmatrelvir-ritonavir (Paxlovid) is supplied as two products; take 300 mg (2 Tabs) of Nirmatrelvir and 100 mg (1 Tab) of ritonavir to make a single dose taken twice daily for 5 days., X 5 Day(s), # 1 Kit, 0 Refill(s), Disp: , Rfl:   •  omeprazole (priLOSEC) 20 MG capsule, Take 20 mg by mouth Daily., Disp: , Rfl:   •  phenazopyridine (PYRIDIUM) 100 MG tablet, Take 1 tablet by mouth 2 (Two) Times a Day With Meals. As needed for burning. Take with food, Disp: 30 tablet, Rfl: 3  •  rOPINIRole (REQUIP) 0.25 MG tablet, Take 0.25 mg by mouth Every Night. Take 1 hour before bedtime., Disp: , Rfl:   •  rosuvastatin (CRESTOR) 20 MG tablet, Take 20 mg by mouth Daily., Disp: , Rfl:   •  sucralfate (CARAFATE) 1 g tablet, TAKE 1 TABLET BY MOUTH TWICE A DAY TAKE ON AN EMPTY STOMACH, Disp: , Rfl:   •  theophylline (UNIPHYL) 400 MG 24 hr tablet, Take 400 mg by mouth Daily., Disp: , Rfl:   •  traMADol (ULTRAM) 50 MG tablet, Take 50 mg by mouth 2 (Two) Times a Day., Disp: , Rfl:   •  Vibegron 75 MG tablet, Take 1 tablet by mouth Daily., Disp: 30 tablet, Rfl: 6  •  calcium-vitamin D (OSCAL-500) 500-200 MG-UNIT per tablet, Take 1 tablet by mouth 2 (Two) Times a Day., Disp: , Rfl:   •  clobetasol (TEMOVATE) 0.05 % external solution, APPLY 10-15 DROPS TO THE AFFECTED AREAS IN THE SCALP ONCE DAILY UNTIL CLEAR, Disp: , Rfl:   •  Contour Next Test test strip, USE AS DIRECTED IN VITRO 3 TIMES A DAY 90 DAYS, Disp: , Rfl:   •  dexamethasone (DECADRON) 6 MG tablet, TAKE 1 TABLET BY MOUTH ONCE DAILY FOR 7 DAYS, Disp: , Rfl:   •  Diclofenac Sodium (VOLTAREN) 1 % gel gel, , Disp: , Rfl:   •  estradiol (ESTRACE) 0.1 MG/GM vaginal cream, Apply 0.5g vaginally 2 times a week in evening, Disp: 42.5 g, Rfl: 5  •  folic acid (FOLVITE) 1 MG tablet, Take 1 mg by mouth Daily., Disp: , Rfl:   •  furosemide (LASIX) 40 MG tablet, Take 40 mg by mouth Daily As Needed (FOR SWELLING)., Disp: , Rfl:   •  gabapentin (NEURONTIN)  300 MG capsule, Take 300 mg by mouth 3 (Three) Times a Day., Disp: , Rfl:     Current Facility-Administered Medications:   •  bupivacaine (MARCAINE) 0.5 % 10 mL, heparin (porcine) 20,000 Units, hydrocortisone sodium succinate (Solu-CORTEF) 100 mg, sodium bicarbonate 4.2 % 7.5 mEq, Lidocaine Viscous HCl (XYLOCAINE) 2 % 12 mL, gentamicin (GARAMYCIN) 80 mg irrigation, , Intracatheter, Once, Shamika Pandey, APRN  •  bupivacaine (MARCAINE) 0.5 % 10 mL, heparin (porcine) 20,000 Units, sodium bicarbonate 8.4 % 15 mL, Lidocaine Viscous HCl (XYLOCAINE) 2 % 12 mL, gentamicin (GARAMYCIN) 80 mg irrigation, , Intracatheter, Once, Shamika Pandey, APRN  •  bupivacaine (MARCAINE) 0.5 % 10 mL, hydrocortisone sodium succinate (Solu-CORTEF) 100 mg, sodium bicarbonate 4.2 % 7.5 mEq, Lidocaine Viscous HCl (XYLOCAINE) 2 % 12 mL, gentamicin (GARAMYCIN) 80 mg irrigation, , Intracatheter, Once, Shamika Pandey, APRN  •  gentamicin (GARAMYCIN) injection 80 mg, 80 mg, Intravesical, Once, Shamika Pandey, APRN  •  gentamicin (GARAMYCIN) injection 80 mg, 80 mg, Intravesical, Once, Shamika Pandey, APRN  •  gentamicin (GARAMYCIN) injection 80 mg, 80 mg, Intramuscular, Once, Shamika Pandey, APRN  •  heparin (porcine) 20,000 Units, hydrocortisone sodium succinate (Solu-CORTEF) 100 mg, sodium bicarbonate 8.4 % 15 mL, Lidocaine Viscous HCl (XYLOCAINE) 2 % 12 mL irrigation, , Intracatheter, Once, Shamika Pandey, APRN  •  heparin (porcine) 20,000 Units, hydrocortisone sodium succinate (Solu-CORTEF) 100 mg, sodium bicarbonate 8.4 % 15 mL, Lidocaine Viscous HCl (XYLOCAINE) 2 % 12 mL, gentamicin (GARAMYCIN) 80 mg irrigation, , Intracatheter, Once, Shamika Pandey, APRN  •  heparin (porcine) injection 10,000 Units, 10,000 Units, Intracatheter, Once, Shamika Pandey, APRN  •  heparin (porcine) injection 10,000 Units, 10,000 Units, Intravenous, Once, Rosi Flood MD  •  hydrocortisone sodium succinate (Solu-CORTEF)  "injection 100 mg, 100 mg, Intravenous, Q6H, Shamika Pandey APRN  •  hydrocortisone sodium succinate (Solu-CORTEF) injection 100 mg, 100 mg, Intravenous, Q6H, Rosi Flood MD  •  Lidocaine Viscous HCl (XYLOCAINE) 2 % solution 6 mL, 6 mL, Mouth/Throat, Once, Shamika Pandey, APRN  •  Lidocaine Viscous HCl (XYLOCAINE) 2 % solution 6 mL, 6 mL, Mouth/Throat, Once, Shamika Pandey APRN  •  sodium bicarbonate injection 8.4% 50 mEq, 50 mEq, Injection, Once, Shamika Pandey, APRN       OBJECTIVE    Vital Signs   /47 (BP Location: Left arm, Patient Position: Sitting, Cuff Size: Adult)   Pulse 58   Temp 98.2 °F (36.8 °C)   Resp 18   Ht 162.6 cm (64\")   Wt 68.9 kg (152 lb)   SpO2 96%   BMI 26.09 kg/m²     Physical Exam  Vitals reviewed.   Constitutional:       Appearance: Normal appearance.   HENT:      Head: Normocephalic and atraumatic.      Nose: Nose normal.      Mouth/Throat:      Mouth: Mucous membranes are moist.      Pharynx: Oropharynx is clear.   Eyes:      Extraocular Movements: Extraocular movements intact.      Conjunctiva/sclera: Conjunctivae normal.      Pupils: Pupils are equal, round, and reactive to light.   Cardiovascular:      Rate and Rhythm: Normal rate and regular rhythm.      Pulses: Normal pulses.      Heart sounds: Normal heart sounds.   Pulmonary:      Effort: Pulmonary effort is normal. No respiratory distress.      Breath sounds: Normal breath sounds. No stridor. No wheezing, rhonchi or rales.      Comments: Diminished breath sounds bilateral lower lung fields  Abdominal:      General: Bowel sounds are normal.   Musculoskeletal:         General: Normal range of motion.      Cervical back: Normal range of motion and neck supple.   Skin:     General: Skin is warm and dry.   Neurological:      General: No focal deficit present.      Mental Status: She is alert and oriented to person, place, and time.   Psychiatric:         Mood and Affect: Mood normal.         " Behavior: Behavior normal.          Results Review  I have personally reviewed the echo from 12/15/2021 CT scan from 5/4/2022 with the following:  Interpretation Summary    · Left ventricular ejection fraction appears to be 61 - 65%. Left ventricular systolic function is normal.  · Left ventricular wall thickness is consistent with mild to moderate concentric hypertrophy.  · Left ventricular diastolic function is consistent with (grade I) impaired relaxation.  · There are no hemodynamically significant valvular abnormalities.  · Unable to calculate pulmonary artery systolic pressure due to incomplete TR Doppler envelope.  · Inferior vena cava is dilated    CT Chest Without Contrast Diagnostic [[id number]] (Order [id number])  Order  Status: Final result             Appointment Information      Display Notes    V-AS                   PACS Images     Radiology Images    Study Result    Narrative & Impression   PROCEDURE:  CT CHEST WO CONTRAST DIAGNOSTIC     COMPARISON: [facility], PET, NM PET/CT SKULL BASE TO MID THIGH, 1/31/2022, 10:03.    [facility], CT, CT CHEST WO CONTRAST DIAGNOSTIC, 12/20/2021, 16:43.  [facility], CT, CT ABDOMEN PELVIS WO CONTRAST, 12/20/2021, 16:43.     INDICATIONS:  FOLLOW UP LUNG CA. PT HAS TAKEN NO TREATMENTS.     TECHNIQUE:    CT images were created without the administration of contrast material.       PROTOCOL:     Standard imaging protocol performed                 RADIATION:      DLP: 283mGy*cm               Automated exposure control was utilized to minimize radiation dose.      FINDINGS:          The visualized soft tissue structures at the base of the neck including the thyroid appear within   normal limits.  There is no lower cervical or axillary adenopathy by size criteria.     The heart size is normal.  There is no pericardial effusion.  There is stable focal aneurysm   involving the aortic arch.  There is coronary and aortic atherosclerotic  calcification.  The main   pulmonary artery appears normal in caliber.  There are partially calcified right paratracheal,   subcarinal, and right hilar lymph nodes likely related to chronic granulomatous disease.  There is   stable fullness of the right hilar region with narrowing of the central right middle lobe and right   lower lobe airways.     There is worsening consolidation and volume loss within the right middle lobe likely related to the   centralized bronchial narrowing compared to the prior examination.  There is mildly improved   aeration of the right lower lobe without progression of volume loss.  There is some areas of   tree-in-bud nodularity within the periphery of the right upper lobe as well as an irregular   ground-glass nodule measuring 9 mm which appears new from the prior examination and is best seen on   image 37. There is no pleural effusion or pneumothorax. .      The esophagus is normal in course and caliber.  Visualized portions of the upper abdomen   demonstrate asymmetric atrophy of the left kidney with respect to the right.  There is mild   dilation of the right renal pelvis.  There are multilevel degenerative changes of the thoracic   spine.  No suspicious lytic or sclerotic osseous lesion within the thorax.     IMPRESSION:                 1. Increasing right middle lobe volume loss likely related to central bronchial wall thickening   involving the right middle and right lower lobe airways.  The right lower lobe has mildly improved   aeration when compared to the prior examination.  2. Similar appearing fullness of the right hilum with likely underlying hilar mass compatible with   positive pathology from bronchoscopy.  3. Irregular ground-glass nodule within the inferior right upper lobe measuring 9 mm, new from   prior exam.       TANNA KAISER MD         Electronically Signed and Approved By: TANNA KAISER MD on 5/05/2022 at 9:01          ASSESSMENT & PLAN    Patient Active  Problem List   Diagnosis   • Osteoarthritis   • Asthma   • Bladder disorder   • Chronic obstructive pulmonary disease (HCC)   • Depression   • Diabetes mellitus (HCC)   • Diabetic renal disease (HCC)   • Hemorrhoids   • Generalized anxiety disorder   • Gastro-esophageal reflux disease with esophagitis   • Edema   • Dysuria   • Post-menopausal bleeding   • Neuropathy   • Mixed hyperlipidemia   • Melanoma (HCC)   • Long term current use of insulin (HCC)   • Limb swelling   • Hypothyroid   • Hypertension   • Vitamin D deficiency   • Ulcer disease   • Type 2 diabetes mellitus with hyperglycemia (HCC)   • Stuttering   • Stroke (HCC)   • Stage 3 chronic kidney disease (HCC)   • Restless legs syndrome   • Rectal bleeding   • S/P carotid endarterectomy   • Acute cystitis without hematuria   • Cellulitis and abscess of foot   • Cellulitis   • Hilar mass   • Hyperkalemia   • Neuralgia   • Proteinuria   • Retinal disorder   • Chronic UTI   • Other chronic pain   • Post-void dribbling   • Post-menopausal atrophic vaginitis   • Cystitis without hematuria   • Pain emptying bladder   • Recurrent UTI   • Cellulitis of left thigh   • Insect bite of left thigh   • DDD (degenerative disc disease), lumbar   • Spinal stenosis   • Adenocarcinoma, lung, right (HCC)   • Candidal skin infection   • Absence of bladder continence       Diagnoses and all orders for this visit:    1. Adenocarcinoma, lung, right (HCC) (Primary)    2. Stage 3 chronic kidney disease, unspecified whether stage 3a or 3b CKD (HCC)    3. Dyspnea on exertion       Plan:  Patient to continue with albuterol and duo neb nebulizers. Patient to titrate her 02 to keep her sats greater than 92%. Patient to followup in 3 months or sooner if needed.     Smoking status:former  Vaccination status:up to date  Medications personally reviewed    Follow Up  Return in about 3 months (around 9/28/2022).    Patient was given instructions and counseling regarding her condition or for  health maintenance advice. Please see specific information pulled into the AVS if appropriate.

## 2022-06-29 NOTE — TELEPHONE ENCOUNTER
Please ignore previous note, pharmacy did not need need albuterol inhaler. They needed diagnosis code which I provided when called.

## 2022-06-29 NOTE — TELEPHONE ENCOUNTER
Patient FYI, patient's daughter came into the clinic today stating that we need to resend in her mother's prescription into [facility] on [address] for  albuterol sulfate  (90 Base) MCG/ACT inhaler. Please advise.

## 2022-07-18 NOTE — PROGRESS NOTES
"Procedure   Irrigation of Bladder    Date/Time: 7/18/2022 2:00 PM  Performed by: Shamika Pandey APRN  Authorized by: Shamika Pandey APRN   Local anesthesia used: yes    Anesthesia:  Local anesthesia used: yes  Local Anesthetic: topical anesthetic (2% lidocaine jelly)  Anesthetic total: 12 mL    Sedation:  Patient sedated: no    Patient tolerance: patient tolerated the procedure well with no immediate complications             DX: Recurrent uti and Bladder pain      Urethral catheterization was performed without difficulty.  12 Palestinian red rubber catheter was used. Approximately 20cc clear yellow urine return when inserted. Mild resistance this time. Solution of Heparin 5,000 units, Marcaine 0.5% 10 mL, Solu-Cortef 100 mg, sodium bicarbonate 8.4% 15 mL, gentamicin 80 mg, viscous lidocaine 2% 12 cc was mixed and instilled into the urinary bladder.  35 mL of sodium bicarbonate was discarded.  Patient is to leave the solution remaining in bladder for a minimum of 1 hour or until next void.     Patient did try gemtesa samples and not sure if it was helpful. Stopped taking. No problems with incontinence today. Recently received Levaquin per pcp for Respiratory Tract infection after returning from [place]. Reports had tested negative for covid but not feeling well and cough with congestion.  \"they checked my urine and said I did not have an infection\". In office, wheezing bibasilar.  No rales or Rhonchi. o2 sat 93 on Room air.   " None

## 2022-08-01 NOTE — PROGRESS NOTES
Procedure   Procedures   Bladder Treatment    DX: Recurrent uti and painful voiding..      Urethral catheterization was performed without difficulty.  12 Mongolian red rubber catheter was used.  Heparin 5,000 units, Marcaine 0.5% 10 mL, Solu-Cortef 100 mg, sodium bicarbonate 8.4% 15 mL, gentamicin 80 mg, viscous lidocaine 2% 12 cc was mixed and instilled into the urinary bladder.  35 mL of sodium bicarbonate was discarded.  Patient is to leave the solution remaining in bladder for a minimum of 1 hour or until next void.Tolerated without difficulty

## 2022-08-15 NOTE — PROGRESS NOTES
Primary Care Provider  Jayne Zamora APRN   Referring Provider  No ref. provider found    Patient Complaint  COPD, Asthma, Follow-up (3 Month Follow Up ), Shortness of Breath, Cough (Productive), and Chest Pain (Chest soreness from coughing )      SUBJECTIVE    History of Presenting Illness  Laury Palacios is a pleasant 89 y.o. female who presents to [facility] for a month follow-up.  Ms. Palacios is here with her daughter today.  Patient recently had a vacation and cruise in [facility].  Patient states she did very well down there on her trip.  Patient presents today with some wheezing that she has had for the past 2 weeks.  She continues to be using her albuterol inhaler her DuoNeb.  Patient states she was not aware of any exposure to COVID while on vacation.  She does not have any other symptoms except for wheezing.  She feels that her wheezing is mostly on the right side.  Patient does have a diagnosis of hilar mass, COPD.  Patient does not want to have any further diagnostics done in regards to the hilar mass and confirms that today.  Patient is needing a refill on her albuterol today.  She does have shortness of air when she exerts herself which is of moderate severity but recovers easily with rest.  States she does have a bit of a cough sometimes and has been using Mucinex to help expectorate some of the thick mucus that she feels get stuck in the back of her throat.  Patient states she has had some swelling in her feet and ankles with the left worse than the right.  She does takes Lasix 40 mg daily.  Daughter states that doctor has told her she can increase her Lasix 1 extra day as needed for swelling in her legs.  However patient has not done this as of yet.  She is under the care of Dr. Jefferson for her chronic kidney disease.  Patient does have mild degenerative changes in the thoracic spine per her last CT scan she does complain of having some back  discomfort.    Denies headaches, chest pain, weight loss or hemoptysis. Denies fevers, chills and night sweats. Laury Palacios is able to perform ADLs without difficulties and denies any swollen glands/lymph nodes in the head or neck.    I have personally reviewed the review of systems, past family, social, medical and surgical histories; and agree with their findings.    Review of Systems   Constitutional: Negative.  Negative for chills, fatigue, fever and unexpected weight change.   HENT: Negative.    Eyes: Negative.    Respiratory: Positive for cough, shortness of breath and wheezing.    Cardiovascular: Positive for leg swelling. Negative for chest pain and palpitations.   Musculoskeletal: Positive for arthralgias.   Skin: Negative.         Family History   Problem Relation Age of Onset   • No Known Problems Mother    • No Known Problems Father    • Breast cancer Sister         30s   • Ovarian cancer Sister    • Melanoma Sister    • Malig Hyperthermia Neg Hx         Social History     Socioeconomic History   • Marital status:    Tobacco Use   • Smoking status: Former Smoker     Packs/day: 0.50     Years: 20.00     Pack years: 10.00     Types: Cigarettes     Quit date:      Years since quittin.6   • Smokeless tobacco: Never Used   • Tobacco comment: social smoker   Vaping Use   • Vaping Use: Never used   Substance and Sexual Activity   • Alcohol use: Never   • Drug use: No   • Sexual activity: Defer        Past Medical History:   Diagnosis Date   • Acid reflux    • Anemia    • Arthritis    • Asthma    • Bladder disorder    • Chronic renal failure    • Condition not found     Ulcer   • DDD (degenerative disc disease), lumbar    • Diabetes mellitus (HCC)     TYPE TWO   • Dysuria    • Ectropion of left eye    • Gastric ulcer    • Hemorrhoids 2018    grade II   • High blood pressure    • History of transfusion    • Hyperlipidemia    • Hypertension    • Limb pain    • Limb swelling    • Long-term current  use of insulin for diabetes mellitus (HCC)    • Melanoma (HCC)     BACK   • Neck fracture (HCC) 1986    C4C5 NO SURGERY   • Peripheral neuropathy    • Rectal bleeding    • Spinal stenosis    • Squamous cell carcinoma     FACE REMOVED   • Stroke (HCC)    • Stuttering    • TIA (transient ischemic attack)         Immunization History   Administered Date(s) Administered   • COVID-19 (MODERNA) 1st, 2nd, 3rd Dose Only 02/25/2021, 02/25/2021, 02/25/2021, 03/17/2021, 03/17/2021, 03/17/2021   • COVID-19 (PFIZER) PURPLE CAP 02/09/2021, 02/09/2021, 02/09/2021, 03/02/2021, 03/02/2021, 03/02/2021, 12/02/2021   • Fluzone Split Quad (Multi-dose) 09/26/2014, 12/15/2020, 10/12/2021   • Influenza Quad Vaccine (Inpatient) 09/26/2014, 12/15/2020       Allergies   Allergen Reactions   • Codeine Nausea And Vomiting   • Morphine Nausea And Vomiting   • Tramadol Nausea Only   • Ciprofloxacin Rash          Current Outpatient Medications:   •  albuterol sulfate  (90 Base) MCG/ACT inhaler, Inhale 2 puffs Every 4 (Four) Hours As Needed for Wheezing., Disp: 18 g, Rfl: 3  •  amLODIPine (NORVASC) 2.5 MG tablet, Take 2.5 mg by mouth Daily., Disp: , Rfl:   •  bacitracin 500 UNIT/GM ointment, 1 APPLICATION EXTERNALLY ONCE A DAY 7 DAY(S), Disp: , Rfl:   •  BD Pen Needle Joi 2nd Gen 32G X 4 MM misc, , Disp: , Rfl:   •  calcium-vitamin D (OSCAL-500) 500-200 MG-UNIT per tablet, Take 1 tablet by mouth 2 (Two) Times a Day., Disp: , Rfl:   •  clobetasol (TEMOVATE) 0.05 % external solution, APPLY 10-15 DROPS TO THE AFFECTED AREAS IN THE SCALP ONCE DAILY UNTIL CLEAR, Disp: , Rfl:   •  Contour Next Test test strip, USE AS DIRECTED IN VITRO 3 TIMES A DAY 90 DAYS, Disp: , Rfl:   •  dexamethasone (DECADRON) 6 MG tablet, TAKE 1 TABLET BY MOUTH ONCE DAILY FOR 7 DAYS, Disp: , Rfl:   •  Diclofenac Sodium (VOLTAREN) 1 % gel gel, , Disp: , Rfl:   •  estradiol (ESTRACE) 0.1 MG/GM vaginal cream, Apply 0.5g vaginally 2 times a week in evening, Disp: 42.5 g, Rfl:  5  •  folic acid (FOLVITE) 1 MG tablet, Take 1 mg by mouth Daily., Disp: , Rfl:   •  furosemide (LASIX) 40 MG tablet, Take 40 mg by mouth Daily As Needed (FOR SWELLING)., Disp: , Rfl:   •  gabapentin (NEURONTIN) 300 MG capsule, Take 300 mg by mouth 3 (Three) Times a Day., Disp: , Rfl:   •  hydrocortisone 2.5 % cream, APPLY TO AFFECTED AREA 4 TIMES A DAY, Disp: , Rfl:   •  insulin aspart (novoLOG FLEXPEN) 100 UNIT/ML solution pen-injector sc pen, Inject 10 Units under the skin into the appropriate area as directed 3 (Three) Times a Day With Meals. BREAKFAST AND DINNER, Disp: , Rfl:   •  insulin detemir (LEVEMIR) 100 UNIT/ML injection, Inject 40 Units under the skin into the appropriate area as directed 2 (Two) Times a Day. BREAKFAST AND DINNER, Disp: , Rfl:   •  ipratropium (ATROVENT) 0.03 % nasal spray, 2 sprays into the nostril(s) as directed by provider 4 (Four) Times a Day., Disp: , Rfl:   •  ipratropium-albuterol (DUO-NEB) 0.5-2.5 mg/3 ml nebulizer, Take 3 mL by nebulization 4 (Four) Times a Day As Needed for Wheezing., Disp: 360 mL, Rfl: 5  •  ketoconazole (NIZORAL) 2 % shampoo, MASSAGE INTO SCALP 15-20 MINUTES BEFORE SHOWER, LET SIT, THEN RINSE., Disp: , Rfl:   •  lisinopril (PRINIVIL,ZESTRIL) 2.5 MG tablet, Take 2.5 mg by mouth Daily., Disp: , Rfl:   •  LORazepam (ATIVAN) 0.5 MG tablet, Take 0.5 mg by mouth 2 (Two) Times a Day., Disp: , Rfl:   •  metoprolol succinate XL (TOPROL-XL) 50 MG 24 hr tablet, Take 50 mg by mouth 2 (Two) Times a Day., Disp: , Rfl:   •  metoprolol-hydrochlorothiazide (LOPRESSOR HCT) 50-25 MG per tablet, Take 1 tablet by mouth 2 (Two) Times a Day., Disp: , Rfl:   •  metroNIDAZOLE (METROGEL) 0.75 % gel, , Disp: , Rfl:   •  multivitamin with minerals tablet tablet, Take 1 tablet by mouth Daily., Disp: , Rfl:   •  mupirocin (BACTROBAN) 2 % ointment, , Disp: , Rfl:   •  omeprazole (priLOSEC) 20 MG capsule, Take 20 mg by mouth Daily., Disp: , Rfl:   •  phenazopyridine (PYRIDIUM) 100 MG  tablet, Take 1 tablet by mouth 2 (Two) Times a Day With Meals. As needed for burning. Take with food, Disp: 30 tablet, Rfl: 3  •  rOPINIRole (REQUIP) 0.25 MG tablet, Take 0.25 mg by mouth Every Night. Take 1 hour before bedtime., Disp: , Rfl:   •  rosuvastatin (CRESTOR) 20 MG tablet, Take 20 mg by mouth Daily., Disp: , Rfl:   •  sucralfate (CARAFATE) 1 g tablet, TAKE 1 TABLET BY MOUTH TWICE A DAY TAKE ON AN EMPTY STOMACH, Disp: , Rfl:   •  theophylline (UNIPHYL) 400 MG 24 hr tablet, Take 400 mg by mouth Daily., Disp: , Rfl:   •  traMADol (ULTRAM) 50 MG tablet, Take 50 mg by mouth 2 (Two) Times a Day., Disp: , Rfl:   •  Vibegron 75 MG tablet, Take 1 tablet by mouth Daily., Disp: 30 tablet, Rfl: 6  •  predniSONE (DELTASONE) 20 MG tablet, Take 0.5 tablets by mouth Daily., Disp: 5 tablet, Rfl: 1    Current Facility-Administered Medications:   •  bupivacaine (MARCAINE) 0.5 % 10 mL, heparin (porcine) 20,000 Units, hydrocortisone sodium succinate (Solu-CORTEF) 100 mg, sodium bicarbonate 4.2 % 7.5 mEq, Lidocaine Viscous HCl (XYLOCAINE) 2 % 12 mL, gentamicin (GARAMYCIN) 80 mg irrigation, , Intracatheter, Once, Shamika Padney, APRN  •  bupivacaine (MARCAINE) 0.5 % 10 mL, heparin (porcine) 5,000 Units, hydrocortisone sodium succinate (Solu-CORTEF) 100 mg, sodium bicarbonate 4.2 % 7.5 mEq, Lidocaine Viscous HCl (XYLOCAINE) 2 % 12 mL, gentamicin (GARAMYCIN) 80 mg irrigation, , Intracatheter, Once, Shamika Pandey, APRN  •  bupivacaine (MARCAINE) 0.5 % 10 mL, heparin (porcine) 5,000 Units, hydrocortisone sodium succinate (Solu-CORTEF) 250 mg, sodium bicarbonate 4.2 % 7.5 mEq, Lidocaine Viscous HCl (XYLOCAINE) 2 % 12 mL, gentamicin (GARAMYCIN) 80 mg irrigation, , Intracatheter, Once, Shamika Pandey, APRN  •  gentamicin (GARAMYCIN) injection 80 mg, 80 mg, Intravesical, Once, Pandey, Shamika B, PADMINI  •  gentamicin (GARAMYCIN) injection 80 mg, 80 mg, Intravesical, Once, Shamika Pandey, PADMINI  •  gentamicin (GARAMYCIN)  "injection 80 mg, 80 mg, Intramuscular, Once, Shamika Pandey APRN  •  gentamicin (GARAMYCIN) injection 80 mg, 80 mg, Intravesical, Once, Shamika Pandey APRN  •  heparin (porcine) 20,000 Units, hydrocortisone sodium succinate (Solu-CORTEF) 100 mg, sodium bicarbonate 8.4 % 15 mL, Lidocaine Viscous HCl (XYLOCAINE) 2 % 12 mL irrigation, , Intracatheter, Once, Shamika Pandey APRN  •  heparin (porcine) 20,000 Units, hydrocortisone sodium succinate (Solu-CORTEF) 100 mg, sodium bicarbonate 8.4 % 15 mL, Lidocaine Viscous HCl (XYLOCAINE) 2 % 12 mL, gentamicin (GARAMYCIN) 80 mg irrigation, , Intracatheter, Once, Shamika Pandey APRN  •  heparin (porcine) injection 10,000 Units, 10,000 Units, Intracatheter, Once, Shamika Pandey APRN  •  heparin (porcine) injection 10,000 Units, 10,000 Units, Intravenous, Once, Rosi Flood MD  •  heparin (porcine) injection 10,000 Units, 10,000 Units, Intravenous, Once, Shamika Pandey APRN  •  hydrocortisone sodium succinate (Solu-CORTEF) injection 100 mg, 100 mg, Intravenous, Q6H, Shamika Pandey APRN  •  hydrocortisone sodium succinate (Solu-CORTEF) injection 100 mg, 100 mg, Intravenous, Q6H, Rosi Flood MD  •  Lidocaine Viscous HCl (XYLOCAINE) 2 % solution 6 mL, 6 mL, Mouth/Throat, Once, Shamika Pandey APRN  •  Lidocaine Viscous HCl (XYLOCAINE) 2 % solution 6 mL, 6 mL, Mouth/Throat, Once, Shamika Pandey APRN  •  sodium bicarbonate injection 8.4% 50 mEq, 50 mEq, Injection, Once, Shamika Pandey APRN  •  sodium bicarbonate injection 8.4% 50 mEq, 50 mEq, Intravenous, Once, Shamika Pandey APRN  •  sodium bicarbonate injection 8.4% 50 mEq, 50 mEq, Intravenous, Once, Shamika Pandey APRN       OBJECTIVE    Vital Signs   /46 (BP Location: Left arm, Patient Position: Sitting, Cuff Size: Adult)   Pulse 66   Temp 98.4 °F (36.9 °C) (Infrared)   Resp 16   Ht 161.3 cm (63.5\")   Wt 68.5 kg (151 lb)   SpO2 99% Comment: " 2 Liters  BMI 26.33 kg/m²     Physical Exam  Vitals reviewed.   Constitutional:       General: She is not in acute distress.     Appearance: Normal appearance. She is not ill-appearing.   HENT:      Head: Normocephalic and atraumatic.      Nose: Nose normal.      Mouth/Throat:      Mouth: Mucous membranes are moist.      Pharynx: Oropharynx is clear.   Eyes:      Extraocular Movements: Extraocular movements intact.      Conjunctiva/sclera: Conjunctivae normal.      Pupils: Pupils are equal, round, and reactive to light.   Cardiovascular:      Rate and Rhythm: Normal rate and regular rhythm.      Pulses: Normal pulses.      Heart sounds: Normal heart sounds.   Pulmonary:      Effort: Pulmonary effort is normal. No respiratory distress.      Breath sounds: No stridor. Wheezing present. No rhonchi or rales.   Abdominal:      General: Bowel sounds are normal.   Musculoskeletal:         General: Normal range of motion.      Cervical back: Normal range of motion and neck supple.   Skin:     General: Skin is warm and dry.   Neurological:      General: No focal deficit present.      Mental Status: She is alert and oriented to person, place, and time.   Psychiatric:         Mood and Affect: Mood normal.         Behavior: Behavior normal.          Results Review  I have personally reviewed the prior office notes, last CT scan  CT Chest Without Contrast Diagnostic [[id number]] (Order [id number])  Order  Status: Final result             Appointment Information      Display Notes    V-AS                   PACS Images     Radiology Images    Study Result    Narrative & Impression   PROCEDURE:  CT CHEST WO CONTRAST DIAGNOSTIC     COMPARISON: [facility], PET, NM PET/CT SKULL BASE TO MID THIGH, 1/31/2022, 10:03.    [facility], CT, CT CHEST WO CONTRAST DIAGNOSTIC, 12/20/2021, 16:43.  [facility], CT, CT ABDOMEN PELVIS WO CONTRAST, 12/20/2021, 16:43.     INDICATIONS:  FOLLOW UP LUNG CA. PT HAS  TAKEN NO TREATMENTS.     TECHNIQUE:    CT images were created without the administration of contrast material.       PROTOCOL:     Standard imaging protocol performed                 RADIATION:      DLP: 283mGy*cm               Automated exposure control was utilized to minimize radiation dose.      FINDINGS:          The visualized soft tissue structures at the base of the neck including the thyroid appear within   normal limits.  There is no lower cervical or axillary adenopathy by size criteria.     The heart size is normal.  There is no pericardial effusion.  There is stable focal aneurysm   involving the aortic arch.  There is coronary and aortic atherosclerotic calcification.  The main   pulmonary artery appears normal in caliber.  There are partially calcified right paratracheal,   subcarinal, and right hilar lymph nodes likely related to chronic granulomatous disease.  There is   stable fullness of the right hilar region with narrowing of the central right middle lobe and right   lower lobe airways.     There is worsening consolidation and volume loss within the right middle lobe likely related to the   centralized bronchial narrowing compared to the prior examination.  There is mildly improved   aeration of the right lower lobe without progression of volume loss.  There is some areas of   tree-in-bud nodularity within the periphery of the right upper lobe as well as an irregular   ground-glass nodule measuring 9 mm which appears new from the prior examination and is best seen on   image 37. There is no pleural effusion or pneumothorax. .      The esophagus is normal in course and caliber.  Visualized portions of the upper abdomen   demonstrate asymmetric atrophy of the left kidney with respect to the right.  There is mild   dilation of the right renal pelvis.  There are multilevel degenerative changes of the thoracic   spine.  No suspicious lytic or sclerotic osseous lesion within the thorax.      IMPRESSION:                 1. Increasing right middle lobe volume loss likely related to central bronchial wall thickening   involving the right middle and right lower lobe airways.  The right lower lobe has mildly improved   aeration when compared to the prior examination.  2. Similar appearing fullness of the right hilum with likely underlying hilar mass compatible with   positive pathology from bronchoscopy.  3. Irregular ground-glass nodule within the inferior right upper lobe measuring 9 mm, new from   prior exam.              TANNA KAISER MD         Electronically Signed and Approved By: TANNA KAISER MD on 5/05/2022 at 9:01                ASSESSMENT & PLAN    Patient Active Problem List   Diagnosis   • Osteoarthritis   • Asthma   • Bladder disorder   • Chronic obstructive pulmonary disease (HCC)   • Depression   • Diabetes mellitus (HCC)   • Diabetic renal disease (HCC)   • Hemorrhoids   • Generalized anxiety disorder   • Gastro-esophageal reflux disease with esophagitis   • Edema   • Dysuria   • Post-menopausal bleeding   • Neuropathy   • Mixed hyperlipidemia   • Melanoma (HCC)   • Long term current use of insulin (HCC)   • Limb swelling   • Hypothyroid   • Hypertension   • Vitamin D deficiency   • Ulcer disease   • Type 2 diabetes mellitus with hyperglycemia (HCC)   • Stuttering   • Stroke (HCC)   • Stage 3 chronic kidney disease (HCC)   • Restless legs syndrome   • Rectal bleeding   • S/P carotid endarterectomy   • Acute cystitis without hematuria   • Cellulitis and abscess of foot   • Cellulitis   • Hilar mass   • Hyperkalemia   • Neuralgia   • Proteinuria   • Retinal disorder   • Chronic UTI   • Other chronic pain   • Post-void dribbling   • Post-menopausal atrophic vaginitis   • Cystitis without hematuria   • Pain with urination   • Recurrent UTI   • Cellulitis of left thigh   • Insect bite of left thigh   • DDD (degenerative disc disease), lumbar   • Spinal stenosis   • Adenocarcinoma, lung,  right (HCC)   • Candidal skin infection   • Absence of bladder continence       Diagnoses and all orders for this visit:    1. Chronic obstructive pulmonary disease, unspecified COPD type ([facility]) (Primary)  -     predniSONE (DELTASONE) 20 MG tablet; Take 0.5 tablets by mouth Daily.  Dispense: 5 tablet; Refill: 1  -     ipratropium-albuterol (DUO-NEB) 0.5-2.5 mg/3 ml nebulizer; Take 3 mL by nebulization 4 (Four) Times a Day As Needed for Wheezing.  Dispense: 360 mL; Refill: 5    2. Dyspnea on exertion  -     predniSONE (DELTASONE) 20 MG tablet; Take 0.5 tablets by mouth Daily.  Dispense: 5 tablet; Refill: 1    3. Wheeze  -     predniSONE (DELTASONE) 20 MG tablet; Take 0.5 tablets by mouth Daily.  Dispense: 5 tablet; Refill: 1    4. Adenocarcinoma of right lung ([facility])  -     ipratropium-albuterol (DUO-NEB) 0.5-2.5 mg/3 ml nebulizer; Take 3 mL by nebulization 4 (Four) Times a Day As Needed for Wheezing.  Dispense: 360 mL; Refill: 5    5. Hilar mass  -     ipratropium-albuterol (DUO-NEB) 0.5-2.5 mg/3 ml nebulizer; Take 3 mL by nebulization 4 (Four) Times a Day As Needed for Wheezing.  Dispense: 360 mL; Refill: 5    6. Stage 3 chronic kidney disease, unspecified whether stage 3a or 3b CKD ([facility])  -     ipratropium-albuterol (DUO-NEB) 0.5-2.5 mg/3 ml nebulizer; Take 3 mL by nebulization 4 (Four) Times a Day As Needed for Wheezing.  Dispense: 360 mL; Refill: 5    Other orders  -     Discontinue: albuterol sulfate  (90 Base) MCG/ACT inhaler; Inhale 2 puffs Every 4 (Four) Hours As Needed for Wheezing.  Dispense: 18 g; Refill: 3  -     albuterol sulfate  (90 Base) MCG/ACT inhaler; Inhale 2 puffs Every 4 (Four) Hours As Needed for Wheezing.  Dispense: 18 g; Refill: 3           Plan:  We will treat patient for her wheeze with some steroid today.  Instructed patient to follow-up if no improvement.  Refills on her albuterol and her nebulizer today.  Patient to follow-up in 2 months or sooner if needed    Smoking status:  Former  Vaccination status: Up to date with COVID and flu vaccine  Medications personally reviewed    Follow Up  Return in about 2 months (around 10/16/2022).    Patient was given instructions and counseling regarding her condition or for health maintenance advice. Please see specific information pulled into the AVS if appropriate.

## 2022-08-15 NOTE — PROGRESS NOTES
Procedure   Irrigation of Bladder    Date/Time: 8/16/2022 5:07 PM  Performed by: Shamika Pandey APRN  Authorized by: Shamika Pandey APRN   Preparation: Patient was prepped and draped in the usual sterile fashion.  Local anesthesia used: yes    Anesthesia:  Local anesthesia used: yes  Local Anesthetic: topical anesthetic (viscous lidocaine 2%)  Anesthetic total: 6 mL  Patient tolerance: patient tolerated the procedure well with no immediate complications             DX: Recurrent UTI, Painful Voiding      Urethral catheterization was performed without difficulty.  12 Czech red rubber catheter was used.  Heparin 3,000 units, Marcaine 0.5% 10 mL, Solu-Cortef 100 mg, sodium bicarbonate 8.4% 15 mL, gentamicin 80 mg, viscous lidocaine 2% 12 cc was mixed and instilled into the urinary bladder.  35 mL of sodium bicarbonate was discarded.  Patient is to leave the solution remaining in bladder for a minimum of 1 hour or until next void. Procedure tolerated well.

## 2022-09-06 NOTE — PROGRESS NOTES
Procedure      DX: Recurrent UTI  Bladder instillation Treatment    Date/Time: 9/15/2022 11:50 PM  Performed by: Shamika Pandey APRN  Authorized by: Shamika Pandey APRN   Preparation: Patient was prepped and draped in the usual sterile fashion.  Local anesthesia used: yes    Anesthesia:  Local anesthesia used: yes  Local Anesthetic: topical anesthetic  Anesthetic total: 12 (viscous lidocaine 2%) mL  Patient tolerance: patient tolerated the procedure well with no immediate complications          Urethral catheterization was performed without difficulty.  12 [place] red rubber catheter was used.  Heparin 10,000 units, Marcaine 0.5% 10 mL, Solu-Cortef 100 mg, sodium bicarbonate 8.4% 15 mL, gentamicin 80 mg, viscous lidocaine 2% 12 cc was mixed and instilled into the urinary bladder.  35 mL of sodium bicarbonate was discarded.  Patient is to leave the solution remaining in bladder for a minimum of 1 hour or until next void.

## 2022-09-12 NOTE — PROGRESS NOTES
Please notify patient today that her straight cath urine culture indicated E coli as before. Keflex sent to her pharmacy and dosing adjusted for renal function. Thank you.

## 2022-10-04 PROBLEM — N76.0 AV (ANAEROBIC VAGINOSIS): Status: ACTIVE | Noted: 2022-01-01

## 2022-10-04 PROBLEM — B96.89 AV (ANAEROBIC VAGINOSIS): Status: ACTIVE | Noted: 2022-01-01

## 2022-10-10 NOTE — PROGRESS NOTES
Primary Care Provider  Doni Antunez MD   Referring Provider  No ref. provider found    Patient Complaint  Follow-up and COPD      SUBJECTIVE    History of Presenting Illness  Laury Palacios is a pleasant 89 y.o. female who presents to [facility] for followup appointment and COPD/cough. Patient is here with her grandson today. She states she has been having cough for past few weeks but unable to cough anything up. She has not been on any antibiotic or steroids since her last visit with me on 8/16/2022. She continues to use albuterol inhaler, atrovent, and duo nebs. Patient is also on 02 at 2 l/m via nc with 02 stats at 94%. Patient has been diagnosed in the past with hilar mass, adenocarcinoma. Patient has stated that she does not want any chemo/radiation for treatment. Patient states that she saw Dr. Antunez and he started her back on Theophylline though she does not know why.     She does have dyspnea, coughing, occasional wheezing,but denies any headaches, chest pain, weight loss or hemoptysis. Denies fevers, chills and night sweats. Laury Palacios is able to perform ADLs without difficulties but with assistance from family and denies any swollen glands/lymph nodes in the head or neck.    I have personally reviewed the review of systems, past family, social, medical and surgical histories; and agree with their findings.    Review of Systems   Constitutional: Negative.  Negative for chills, diaphoresis, fatigue and fever.   HENT: Negative.    Eyes: Negative.    Respiratory: Positive for cough, shortness of breath and wheezing.    Cardiovascular: Negative.  Negative for chest pain, palpitations and leg swelling.   Musculoskeletal: Negative.    Skin: Negative.         Family History   Problem Relation Age of Onset   • No Known Problems Mother    • No Known Problems Father    • Breast cancer Sister         30s   • Ovarian cancer Sister    • Melanoma Sister    •  Malig Hyperthermia Neg Hx         Social History     Socioeconomic History   • Marital status:    Tobacco Use   • Smoking status: Former     Packs/day: 0.50     Years: 20.00     Pack years: 10.00     Types: Cigarettes     Quit date: 1992     Years since quittin.7   • Smokeless tobacco: Never   • Tobacco comments:     social smoker   Vaping Use   • Vaping Use: Never used   Substance and Sexual Activity   • Alcohol use: Never   • Drug use: No   • Sexual activity: Defer        Past Medical History:   Diagnosis Date   • Acid reflux    • Anemia    • Arthritis    • Asthma    • Bladder disorder    • Chronic renal failure    • Condition not found     Ulcer   • DDD (degenerative disc disease), lumbar    • Diabetes mellitus (HCC)     TYPE TWO   • Dysuria    • Ectropion of left eye    • Gastric ulcer    • Hemorrhoids 2018    grade II   • High blood pressure    • History of transfusion    • Hyperlipidemia    • Hypertension    • Limb pain    • Limb swelling    • Long-term current use of insulin for diabetes mellitus (HCC)    • Melanoma (HCC)     BACK   • Neck fracture (HCC) 1986    C4C5 NO SURGERY   • Peripheral neuropathy    • Rectal bleeding    • Spinal stenosis    • Squamous cell carcinoma     FACE REMOVED   • Stroke (HCC)    • Stuttering    • TIA (transient ischemic attack)         Immunization History   Administered Date(s) Administered   • COVID-19 (MODERNA) 1st, 2nd, 3rd Dose Only 2021, 2021, 2021, 2021, 2021, 2021   • COVID-19 (PFIZER) PURPLE CAP 2021, 2021, 2021, 2021, 2021, 2021, 2021   • Fluzone Quad >6mos (Multi-dose) 2014, 12/15/2020, 10/12/2021   • Influenza Quad Vaccine (Inpatient) 2014, 12/15/2020       Allergies   Allergen Reactions   • Codeine Nausea And Vomiting   • Morphine Nausea And Vomiting   • Tramadol Nausea Only   • Ciprofloxacin Rash          Current Outpatient Medications:   •  albuterol sulfate   (90 Base) MCG/ACT inhaler, Inhale 2 puffs Every 4 (Four) Hours As Needed for Wheezing., Disp: 18 g, Rfl: 3  •  amLODIPine (NORVASC) 2.5 MG tablet, Take 2.5 mg by mouth Daily., Disp: , Rfl:   •  bacitracin 500 UNIT/GM ointment, 1 APPLICATION EXTERNALLY ONCE A DAY 7 DAY(S), Disp: , Rfl:   •  BD Pen Needle Joi 2nd Gen 32G X 4 MM misc, , Disp: , Rfl:   •  calcium-vitamin D (OSCAL-500) 500-200 MG-UNIT per tablet, Take 1 tablet by mouth 2 (Two) Times a Day., Disp: , Rfl:   •  clobetasol (TEMOVATE) 0.05 % external solution, APPLY 10-15 DROPS TO THE AFFECTED AREAS IN THE SCALP ONCE DAILY UNTIL CLEAR, Disp: , Rfl:   •  Contour Next Test test strip, USE AS DIRECTED IN VITRO 3 TIMES A DAY 90 DAYS, Disp: , Rfl:   •  dexamethasone (DECADRON) 6 MG tablet, TAKE 1 TABLET BY MOUTH ONCE DAILY FOR 7 DAYS, Disp: , Rfl:   •  Diclofenac Sodium (VOLTAREN) 1 % gel gel, , Disp: , Rfl:   •  folic acid (FOLVITE) 1 MG tablet, Take 1 mg by mouth Daily., Disp: , Rfl:   •  furosemide (LASIX) 40 MG tablet, Take 40 mg by mouth Daily As Needed (FOR SWELLING)., Disp: , Rfl:   •  gabapentin (NEURONTIN) 300 MG capsule, Take 300 mg by mouth 3 (Three) Times a Day., Disp: , Rfl:   •  hydrocortisone 2.5 % cream, APPLY TO AFFECTED AREA 4 TIMES A DAY, Disp: , Rfl:   •  insulin aspart (novoLOG FLEXPEN) 100 UNIT/ML solution pen-injector sc pen, Inject 10 Units under the skin into the appropriate area as directed 3 (Three) Times a Day With Meals. BREAKFAST AND DINNER, Disp: , Rfl:   •  insulin detemir (LEVEMIR) 100 UNIT/ML injection, Inject 40 Units under the skin into the appropriate area as directed 2 (Two) Times a Day. BREAKFAST AND DINNER, Disp: , Rfl:   •  ipratropium (ATROVENT) 0.03 % nasal spray, 2 sprays into the nostril(s) as directed by provider 4 (Four) Times a Day., Disp: , Rfl:   •  ipratropium-albuterol (DUO-NEB) 0.5-2.5 mg/3 ml nebulizer, Take 3 mL by nebulization 4 (Four) Times a Day As Needed for Wheezing., Disp: 360 mL, Rfl: 5  •   ketoconazole (NIZORAL) 2 % shampoo, MASSAGE INTO SCALP 15-20 MINUTES BEFORE SHOWER, LET SIT, THEN RINSE., Disp: , Rfl:   •  lisinopril (PRINIVIL,ZESTRIL) 2.5 MG tablet, Take 2.5 mg by mouth Daily., Disp: , Rfl:   •  LORazepam (ATIVAN) 0.5 MG tablet, Take 0.5 mg by mouth 2 (Two) Times a Day., Disp: , Rfl:   •  metoprolol succinate XL (TOPROL-XL) 50 MG 24 hr tablet, Take 50 mg by mouth 2 (Two) Times a Day., Disp: , Rfl:   •  metoprolol-hydrochlorothiazide (LOPRESSOR HCT) 50-25 MG per tablet, Take 1 tablet by mouth 2 (Two) Times a Day., Disp: , Rfl:   •  metroNIDAZOLE (METROGEL) 0.75 % gel, , Disp: , Rfl:   •  multivitamin with minerals tablet tablet, Take 1 tablet by mouth Daily., Disp: , Rfl:   •  mupirocin (BACTROBAN) 2 % ointment, , Disp: , Rfl:   •  omeprazole (priLOSEC) 20 MG capsule, Take 20 mg by mouth Daily., Disp: , Rfl:   •  rOPINIRole (REQUIP) 0.25 MG tablet, Take 0.25 mg by mouth Every Night. Take 1 hour before bedtime., Disp: , Rfl:   •  rosuvastatin (CRESTOR) 20 MG tablet, Take 20 mg by mouth Daily., Disp: , Rfl:   •  theophylline (UNIPHYL) 400 MG 24 hr tablet, Take 400 mg by mouth Daily., Disp: , Rfl:   •  benzonatate (TESSALON) 200 MG capsule, Take 1 capsule by mouth 3 (Three) Times a Day As Needed for Cough., Disp: 42 capsule, Rfl: 1  •  clindamycin (CLEOCIN) 2 % vaginal cream, Insert half tube approximately 3.5 g  vaginally every  night for 28 days, Disp: 80 g, Rfl: 0  •  estradiol (VAGIFEM) 10 MCG tablet vaginal tablet, Insert 1 tablet into the vagina 2 (Two) Times a Week for 30 days. Change from estrogen cream, Disp: 8 tablet, Rfl: 5  •  phenazopyridine (PYRIDIUM) 100 MG tablet, Take 1 tablet by mouth 2 (Two) Times a Day With Meals. As needed for burning. Take with food, Disp: 30 tablet, Rfl: 3  •  predniSONE (DELTASONE) 10 MG tablet, Take 1 PO for 7 days, Disp: 30 tablet, Rfl: 0  •  sucralfate (CARAFATE) 1 g tablet, TAKE 1 TABLET BY MOUTH TWICE A DAY TAKE ON AN EMPTY STOMACH, Disp: , Rfl:   •   traMADol (ULTRAM) 50 MG tablet, Take 50 mg by mouth 2 (Two) Times a Day., Disp: , Rfl:   •  Vibegron 75 MG tablet, Take 1 tablet by mouth Daily., Disp: 30 tablet, Rfl: 6    Current Facility-Administered Medications:   •  bupivacaine (MARCAINE) 0.5 % 10 mL, heparin (porcine) 10,000 Units, sodium bicarbonate 4.2 % 7.5 mEq, Lidocaine Viscous HCl (XYLOCAINE) 2 % 12 mL, gentamicin (GARAMYCIN) 80 mg irrigation, , Intracatheter, Once, Shamika Pandey, APRN  •  bupivacaine (MARCAINE) 0.5 % 10 mL, heparin (porcine) 20,000 Units, hydrocortisone sodium succinate (Solu-CORTEF) 100 mg, sodium bicarbonate 4.2 % 7.5 mEq, Lidocaine Viscous HCl (XYLOCAINE) 2 % 12 mL, gentamicin (GARAMYCIN) 80 mg irrigation, , Intracatheter, Once, Shamika Pandey, APRN  •  bupivacaine (MARCAINE) 0.5 % 10 mL, heparin (porcine) 5,000 Units, hydrocortisone sodium succinate (Solu-CORTEF) 100 mg, sodium bicarbonate 4.2 % 7.5 mEq, Lidocaine Viscous HCl (XYLOCAINE) 2 % 12 mL, gentamicin (GARAMYCIN) 80 mg irrigation, , Intracatheter, Once, Shamiak Pandey, APRN  •  gentamicin (GARAMYCIN) injection 80 mg, 80 mg, Intravesical, Once, Shamika Pandey, APRN  •  gentamicin (GARAMYCIN) injection 80 mg, 80 mg, Intravesical, Once, Shamika Pandey, APRN  •  gentamicin (GARAMYCIN) injection 80 mg, 80 mg, Intramuscular, Once, Shamika Pandey, APRN  •  gentamicin (GARAMYCIN) injection 80 mg, 80 mg, Intravesical, Once, Shamika Pandey, APRN  •  heparin (porcine) 20,000 Units, hydrocortisone sodium succinate (Solu-CORTEF) 100 mg, sodium bicarbonate 8.4 % 15 mL, Lidocaine Viscous HCl (XYLOCAINE) 2 % 12 mL irrigation, , Intracatheter, Once, Shamika Pandey, APRN  •  heparin (porcine) 20,000 Units, hydrocortisone sodium succinate (Solu-CORTEF) 100 mg, sodium bicarbonate 8.4 % 15 mL, Lidocaine Viscous HCl (XYLOCAINE) 2 % 12 mL, gentamicin (GARAMYCIN) 80 mg irrigation, , Intracatheter, Once, Shamika Pandey, APRN  •  heparin (porcine) injection 10,000  "Units, 10,000 Units, Intracatheter, Once, Shamika Pandey APRN  •  heparin (porcine) injection 10,000 Units, 10,000 Units, Intravenous, Once, Rosi Flood MD  •  heparin (porcine) injection 10,000 Units, 10,000 Units, Intravenous, Once, Shamika Pandey APRN  •  hydrocortisone sodium succinate (Solu-CORTEF) injection 100 mg, 100 mg, Intravenous, Q6H, Shamika Pandey APRN  •  hydrocortisone sodium succinate (Solu-CORTEF) injection 100 mg, 100 mg, Intravenous, Q6H, Rosi Flood MD  •  Lidocaine Viscous HCl (XYLOCAINE) 2 % solution 6 mL, 6 mL, Mouth/Throat, Once, Shamika Pandey APRN  •  Lidocaine Viscous HCl (XYLOCAINE) 2 % solution 6 mL, 6 mL, Mouth/Throat, Once, Shamika Pandey APRN  •  sodium bicarbonate injection 8.4% 50 mEq, 50 mEq, Injection, Once, Shamika Pandey APRN  •  sodium bicarbonate injection 8.4% 50 mEq, 50 mEq, Intravenous, Once, Shamika Pandey APRN  •  sodium bicarbonate injection 8.4% 50 mEq, 50 mEq, Intravenous, Once, Shamika Pandey APRN       OBJECTIVE    Vital Signs   /90 (BP Location: Right arm, Patient Position: Sitting, Cuff Size: Adult)   Pulse 78   Temp 98.2 °F (36.8 °C) (Tympanic)   Resp 18   Ht 161.3 cm (63.5\")   Wt 68.1 kg (150 lb 3.2 oz)   SpO2 94% Comment: 2 liter nc  BMI 26.19 kg/m²     Physical Exam  Vitals reviewed.   Constitutional:       General: She is not in acute distress.     Appearance: Normal appearance. She is not ill-appearing.   HENT:      Head: Normocephalic and atraumatic.      Nose: Nose normal.      Mouth/Throat:      Mouth: Mucous membranes are moist.      Pharynx: Oropharynx is clear.   Eyes:      Extraocular Movements: Extraocular movements intact.      Conjunctiva/sclera: Conjunctivae normal.      Pupils: Pupils are equal, round, and reactive to light.   Cardiovascular:      Rate and Rhythm: Normal rate and regular rhythm.      Pulses: Normal pulses.      Heart sounds: Normal heart sounds. "   Pulmonary:      Effort: Pulmonary effort is normal. No respiratory distress.      Breath sounds: No stridor. Wheezing present. No rhonchi or rales.      Comments: Diminished breath sounds bilateral lung fields  Abdominal:      General: Bowel sounds are normal.   Musculoskeletal:         General: Normal range of motion.      Cervical back: Normal range of motion and neck supple.      Right lower leg: No edema.      Left lower leg: No edema.   Lymphadenopathy:      Cervical: No cervical adenopathy.   Skin:     General: Skin is warm and dry.   Neurological:      General: No focal deficit present.      Mental Status: She is alert and oriented to person, place, and time.   Psychiatric:         Mood and Affect: Mood normal.         Behavior: Behavior normal.          Results Review  I have personally reviewed the last CT scan from 5/4/2022 and PET scan from 1/31/2022:  CT Chest Without Contrast Diagnostic [[id number]] (Order [id number])  Order  Status: Final result     Appointment Information    Display Notes    V-AS                   PACS Images     Radiology Images    Study Result    Narrative & Impression   PROCEDURE:  CT CHEST WO CONTRAST DIAGNOSTIC     COMPARISON: [facility], PET, NM PET/CT SKULL BASE TO MID THIGH, 1/31/2022, 10:03.    [facility], CT, CT CHEST WO CONTRAST DIAGNOSTIC, 12/20/2021, 16:43.  [facility], CT, CT ABDOMEN PELVIS WO CONTRAST, 12/20/2021, 16:43.     INDICATIONS:  FOLLOW UP LUNG CA. PT HAS TAKEN NO TREATMENTS.     TECHNIQUE:    CT images were created without the administration of contrast material.       PROTOCOL:     Standard imaging protocol performed                 RADIATION:      DLP: 283mGy*cm               Automated exposure control was utilized to minimize radiation dose.      FINDINGS:          The visualized soft tissue structures at the base of the neck including the thyroid appear within   normal limits.  There is no lower cervical or axillary  adenopathy by size criteria.     The heart size is normal.  There is no pericardial effusion.  There is stable focal aneurysm   involving the aortic arch.  There is coronary and aortic atherosclerotic calcification.  The main   pulmonary artery appears normal in caliber.  There are partially calcified right paratracheal,   subcarinal, and right hilar lymph nodes likely related to chronic granulomatous disease.  There is   stable fullness of the right hilar region with narrowing of the central right middle lobe and right   lower lobe airways.     There is worsening consolidation and volume loss within the right middle lobe likely related to the   centralized bronchial narrowing compared to the prior examination.  There is mildly improved   aeration of the right lower lobe without progression of volume loss.  There is some areas of   tree-in-bud nodularity within the periphery of the right upper lobe as well as an irregular   ground-glass nodule measuring 9 mm which appears new from the prior examination and is best seen on   image 37. There is no pleural effusion or pneumothorax. .      The esophagus is normal in course and caliber.  Visualized portions of the upper abdomen   demonstrate asymmetric atrophy of the left kidney with respect to the right.  There is mild   dilation of the right renal pelvis.  There are multilevel degenerative changes of the thoracic   spine.  No suspicious lytic or sclerotic osseous lesion within the thorax.     IMPRESSION:                 1. Increasing right middle lobe volume loss likely related to central bronchial wall thickening   involving the right middle and right lower lobe airways.  The right lower lobe has mildly improved   aeration when compared to the prior examination.  2. Similar appearing fullness of the right hilum with likely underlying hilar mass compatible with   positive pathology from bronchoscopy.  3. Irregular ground-glass nodule within the inferior right upper  lobe measuring 9 mm, new from   prior exam.              TANNA AKISER MD         Electronically Signed and Approved By: TANNA KAISER MD on 5/05/2022 at 9:01            NM PET/CT Skull Base to Mid Thigh [[id number]] (Order [id number])  Order  Status: Final result     Appointment Information    Display Notes    V-AB                   PACS Images     Radiology Images    Study Result    Narrative & Impression   PROCEDURE:  NM PET SKULL BASE TO MID THIGH     COMPARISON: [facility], CT, CT ABDOMEN PELVIS WO CONTRAST, 12/20/2021, 16:43.  [facility], CT, CT CHEST WO CONTRAST DIAGNOSTIC, 12/20/2021, 16:43.     INDICATIONS:  C34.31     TECHNIQUE:    After obtaining the patient's consent, F-18 FDG was administered intravenously.  PET/CT   imaging was performed from skull to thigh with multi-planar imaging without oral or intravenous   contrast material, using a dedicated integrated PET/CT scanner.       RADIONUCLIDE:     11.04 MCI   F18 FDG- I.V.  LABS:                          Blood Glucose 131 mg/dl  UPTAKE TIME:        66 mins  MEDIASTINAL BACKGROUND UPTAKE:  2.8     FINDINGS:          No abnormal activity in the neck.  There is mild focal activity in the left neck maximum SUV 3.9   thought to be likely vascular.  No lymph nodes seen.  There is mild activity in the lower abdominal   wall which corresponds to skin thickening and previous injection sites or cellulitis.  Aneurysm   again seen of the aortic arch measuring up to about 3.4 centimeters.  There is abnormal activity in   the right hilar region with mild narrowing of the right middle lobe and lower lobe bronchi.  The   area of metabolic activity measures up to about 2 centimeters.  Maximum SUV is about 4.      Redemonstration of opacity in the right upper lobe which is somewhat patchy.  Portion of this is   mildly metabolically active maximum SUV 1.8.  There is denser opacity in the right lower lobe.    Mild patchy opacity in the  right middle lobe and there is mild opacity in the right lower lobe.     There is mild renal atrophy on the left.  Diverticulosis.  Moderate stool seen in the colon.  No   adenopathy is seen.  Aortic aneurysm measuring up to about 3.6 centimeters again seen.  Left common   iliac artery measures about 1.7 centimeters.  Moderate to severe atherosclerotic changes are seen.    Scattered sclerotic foci again seen in the sacrum and left iliac bone with moderate multilevel   degenerative changes.  There is metabolic activity in the cervical spine and lumbar spine likely   degenerative..     IMPRESSION:                 1. There is metabolic activity in the right hilum with maximum SUV of 4 and mild narrowing of the   adjacent bronchi likely due toneoplasm given the history.  2. Focal activity in the left neck maximum SUV 3.9 may be vascular activity.  No definite lymph   node is seen in this region.    3. Patchy areas of opacity are seen in the lungs including the right middle lobe, right upper lobe,   and right lower lobe.  There is mild metabolic activity in the right upper lobe opacity maximum SUV   1.8.  This is more likely due to atelectasis or pneumonia.  Some of the could be postobstructive.    Superimposed neoplasm the right upper lobe difficult to exclude.  Recommend continued attention on   follow-up.  4. No other abnormal activity seen.  Cellulitis or injection site seen in the lower abdominal wall.    There is aneurysms of the aortic arch and abdominal aorta.  Recommend continued follow-up.    Moderate to severe atherosclerosis.            MAYNOR FOX MD         Electronically Signed and Approved By: MAYNOR FOX MD on 1/31/2022 at 15:09                ASSESSMENT & PLAN    Patient Active Problem List   Diagnosis   • Osteoarthritis   • Asthma   • Bladder disorder   • Chronic obstructive pulmonary disease (HCC)   • Depression   • Diabetes mellitus (HCC)   • Diabetic renal disease (HCC)   • Hemorrhoids    • Generalized anxiety disorder   • Gastro-esophageal reflux disease with esophagitis   • Edema   • Dysuria   • Post-menopausal bleeding   • Neuropathy   • Mixed hyperlipidemia   • Melanoma (HCC)   • Long term current use of insulin (HCC)   • Limb swelling   • Hypothyroid   • Hypertension   • Vitamin D deficiency   • Ulcer disease   • Type 2 diabetes mellitus with hyperglycemia (HCC)   • Stuttering   • Stroke (HCC)   • Chronic kidney disease   • Restless legs syndrome   • Rectal bleeding   • S/P carotid endarterectomy   • Acute cystitis without hematuria   • Cellulitis and abscess of foot   • Cellulitis   • Hilar mass   • Hyperkalemia   • Neuralgia   • Proteinuria   • Retinal disorder   • Chronic UTI   • Other chronic pain   • Post-void dribbling   • Post-menopausal atrophic vaginitis   • Cystitis without hematuria   • Pain with urination   • Recurrent UTI   • Cellulitis of left thigh   • Insect bite of left thigh   • DDD (degenerative disc disease), lumbar   • Spinal stenosis   • Adenocarcinoma, lung, right (HCC)   • Candidal skin infection   • Absence of bladder continence   • AV (anaerobic vaginosis)       Diagnoses and all orders for this visit:    1. Adenocarcinoma of right lung (HCC) (Primary)  -     XR Chest 2 View; Future    2. Chronic obstructive pulmonary disease, unspecified COPD type (HCC)  -     XR Chest 2 View; Future    3. Dyspnea on exertion  -     XR Chest 2 View; Future    4. Wheeze  -     XR Chest 2 View; Future    5. Hilar mass  -     XR Chest 2 View; Future    6. Cough, unspecified type  -     XR Chest 2 View; Future  -     benzonatate (TESSALON) 200 MG capsule; Take 1 capsule by mouth 3 (Three) Times a Day As Needed for Cough.  Dispense: 42 capsule; Refill: 1  -     predniSONE (DELTASONE) 10 MG tablet; Take 1 PO for 7 days  Dispense: 30 tablet; Refill: 0           Plan:  Will send patient over for a chest xray at this time and notify of results when available. Will start patient on low dose  prednisone, tessalon for cough. If symptoms worsen, patient advised to go to the ED for further evaluation. Also discussed with patient today plans for [facility] services. Patient states she has appointment tomorrow.      Smoking status:former  Vaccination status:up to date with COVID, needs flu and pneumonia  Medications personally reviewed    Follow Up  Return for Next scheduled follow up.    Patient was given instructions and counseling regarding her condition or for health maintenance advice. Please see specific information pulled into the AVS if appropriate.

## 2022-10-11 NOTE — PROGRESS NOTES
Procedure   Procedures       DX: Recurrent uti and painful voiding. .      Urethral catheterization was performed without difficulty.  12 English red rubber catheter was used.  Heparin 10,000 units, Marcaine 0.5% 10 mL, Solu-Cortef 100 mg, sodium bicarbonate 8.4% 15 mL, gentamicin 80 mg, viscous lidocaine 2% 12 cc was mixed and instilled into the urinary bladder.  35 mL of sodium bicarbonate was discarded.  Patient is to leave the solution remaining in bladder for a minimum of 1 hour or until next void.    She is using the clindamycin cream vaginally every night for the past week and is having less discomfort and irritation perineal and vaginal region. To continue to 3 more weeks. No longer using estrogen vaginal cream. To begin vagifem 2 times per week once received.

## 2022-10-27 NOTE — PROGRESS NOTES
Patient continues to have recurrent UTI.  She is also having hyperglycemia and request referral to an endocrinologist.  Because of recurrent UTI presented with intravesical gentamicin treatment.    Indication.  Recurrent urinary tract infection    Procedure.  Patient was placed in lithotomy position and thorough scrubbing of external genitalia and meatus was performed with Hibiclens.  14 Persian red rubber catheter was inserted through the meatus and the urinary bladder.  Urine was drained.  I went ahead with injection of 80 mg of gentamicin, 10 mL of soda bicarb, 20,000 units of heparin and 2% 12 mL of Xylocaine and 10 mL of Marcaine  in the urinary bladder.  Patient requested to keep this antibiotic in the urine for at least 2 hours before urination.  She tolerated her procedure well.    We are going to refer her to endocrinologist in [place].

## 2022-11-03 NOTE — TELEPHONE ENCOUNTER
Patient called to inquire about a follow up appointment since she would not be under the care of [facility].  Spoke to Dolores and advised patient, 3 months from 10/10/2022.  Patient said she will call back in January to schedule her follow up.

## 2022-11-17 NOTE — PROGRESS NOTES
Patient continues to have recurrent UTI.  She has resistant UTI.  Intravesical gentamicin helps which we are going to do.    Indication.  Recurrent urinary tract infection    Procedure.  Patient was placed in lithotomy position and thorough scrubbing of external genitalia and meatus was performed with Hibiclens.  14 Bermudian red rubber catheter was inserted through the meatus and the urinary bladder.  Urine was drained.  I went ahead with injection of bladder cocktail.  Giving her 20,000 units of heparin, 10 mL of soda bicarb, 10 mL of 1% Marcaine, 80 mg of gentamicin, 12 mL of 2% Xylocaine and 100 mg of Solu-Cortef.  She tolerated the procedure well and we are going to repeat this procedure next week.  I done a urine culture on her to see if there is a simpler antibiotic we can give her p.o. for her infection.  She tolerated her procedure well.

## 2022-11-28 PROBLEM — N30.00 ACUTE CYSTITIS WITHOUT HEMATURIA: Status: RESOLVED | Noted: 2021-07-29 | Resolved: 2022-01-01

## 2022-11-28 NOTE — PROGRESS NOTES
Procedure   Irrigation of Bladder    Date/Time: 11/28/2022 12:58 PM  Performed by: Shamika Pandey APRN  Authorized by: Shamika Pandey APRN   Preparation: Patient was prepped and draped in the usual sterile fashion.  Local anesthesia used: lidocaine jelly topical 2%    Anesthesia:  Local anesthesia used: lidocaine jelly topical 2%    Sedation:  Patient sedated: no    Patient tolerance: patient tolerated the procedure well with no immediate complications             DX: Recurrent uti, Pain with urination.       Urethral catheterization was performed without difficulty.  12 Maori red rubber catheter was used. Approximately 40cc of urine returned with last 10cc with thick greenish yellow purulent urine. Irrigated with 60cc of sterile water and allowed to drain. No purulent urine returned. Solution of Heparin 10,000 units, Marcaine 0.5% 10 mL, Solu-Cortef 100 mg, sodium bicarbonate 8.4% 15 mL, gentamicin 80 mg, viscous lidocaine 2% 12 cc was mixed and instilled into the urinary bladder.  35 mL of sodium bicarbonate was discarded.  Patient is to leave the solution remaining in bladder for a minimum of 1 hour or until next void.    Patient reports began Keflex ordered per Dr Flood and only recently began medication. Urine culture 11/17/22 indicated presence of Ecoli. When in for irrigation next week, will collect straight cath urine sample.  To fill vagifem 10mcg vaginal at night 2 times per week. May continue clindamycin cream vaginally approximately 1 g daily.

## 2022-12-05 PROBLEM — N35.919 URETHRAL SPASM: Status: ACTIVE | Noted: 2022-01-01

## 2022-12-05 NOTE — PROGRESS NOTES
Bladder Treatment Procedure            Bladder Instillation Treatment    Date/Time: 12/5/2022 1:31 PM  Performed by: Shamika Pandey APRN  Authorized by: Shamika Pandey APRN   Local anesthesia used: yes    Anesthesia:  Local anesthesia used: yes  Local Anesthetic: topical anesthetic  Anesthetic total: 6 (lidocaine jelly 2%) mL             DX: Painful urination and recurrent uti      Urethral catheterization was performed without difficulty.  12 Sami red rubber catheter was used. 20cc of clear yellow urine returned. No resistant. Heparin 10,000 units, Marcaine 0.5% 10 mL, Solu-Cortef 100 mg, sodium bicarbonate 8.4% 15 mL, gentamicin 80 mg, viscous lidocaine 2% 12 cc was mixed and instilled into the urinary bladder.  35 mL of sodium bicarbonate was discarded.  Patient is to leave the solution remaining in bladder for a minimum of 1 hour or until next void.    Discussed probable urethral spasms and experience when when urine flow ready to stop. Patient has tried gemtesa in the past and did not think it was helpful. Patient states that she is still on oral antibiotic prescribed and believes few days remaining. Should be completed today if Started Last Saturday or Sunday.

## 2022-12-12 NOTE — TELEPHONE ENCOUNTER
Provider: PADMINI MCWILLIAMS  Caller: KILEY   Relationship to Patient: DAUGHTER  Phone Number: [phone]  Reason for Call: RESCHEDULE TODAY'S APPT  When was the patient last seen: 12/05/22  Additional notes: PT'S DAUGHTER CALLED IN TO INFORM OFFICE THAT PT IS SICK AND WILL NOT MAKE TODAY'S APPT. PT WOULD LIKE TO PUSH APPT TO Monday, 12/19/22 @ 1 PM IF POSSIBLE. PT CAN BE REACHED [phone].

## 2022-12-19 NOTE — PROGRESS NOTES
Procedure   Bladder instillation with antibiotic    Date/Time: 12/19/2022 5:25 PM  Performed by: Shamika Pandey APRN  Authorized by: Shamika Pandey APRN   Preparation: Patient was prepped and draped in the usual sterile fashion.  Local anesthesia used: lidocaine topical 2% to urethal meatus.    Anesthesia:  Local anesthesia used: lidocaine topical 2% to urethal meatus.    Sedation:  Patient sedated: no    Patient tolerance: patient tolerated the procedure well with no immediate complications           Urethral catheterization was performed without difficulty.  12 Kosovan red rubber catheter was used.  Heparin 10,000 units, Marcaine 0.5% 10 mL, Solu-Cortef 100 mg, sodium bicarbonate 8.4% 15 mL, gentamicin 80 mg, viscous lidocaine 2% 12 cc was mixed and instilled into the urinary bladder.  35 mL of sodium bicarbonate was discarded.  Patient is to leave the solution remaining in bladder for a minimum of 1 hour or until next void.

## 2023-01-01 ENCOUNTER — TRANSCRIBE ORDERS (OUTPATIENT)
Dept: ADMINISTRATIVE | Facility: HOSPITAL | Age: 88
End: 2023-01-01
Payer: MEDICARE

## 2023-01-01 ENCOUNTER — APPOINTMENT (OUTPATIENT)
Dept: ULTRASOUND IMAGING | Facility: HOSPITAL | Age: 88
DRG: 309 | End: 2023-01-01
Payer: MEDICARE

## 2023-01-01 ENCOUNTER — APPOINTMENT (OUTPATIENT)
Dept: CT IMAGING | Facility: HOSPITAL | Age: 88
DRG: 309 | End: 2023-01-01
Payer: MEDICARE

## 2023-01-01 ENCOUNTER — OFFICE VISIT (OUTPATIENT)
Dept: PULMONOLOGY | Facility: CLINIC | Age: 88
End: 2023-01-01
Payer: MEDICARE

## 2023-01-01 ENCOUNTER — PROCEDURE VISIT (OUTPATIENT)
Dept: UROLOGY | Facility: CLINIC | Age: 88
End: 2023-01-01
Payer: MEDICARE

## 2023-01-01 ENCOUNTER — APPOINTMENT (OUTPATIENT)
Dept: GENERAL RADIOLOGY | Facility: HOSPITAL | Age: 88
DRG: 309 | End: 2023-01-01
Payer: MEDICARE

## 2023-01-01 ENCOUNTER — APPOINTMENT (OUTPATIENT)
Dept: CARDIOLOGY | Facility: HOSPITAL | Age: 88
DRG: 309 | End: 2023-01-01
Payer: MEDICARE

## 2023-01-01 ENCOUNTER — LAB (OUTPATIENT)
Dept: LAB | Facility: HOSPITAL | Age: 88
End: 2023-01-01
Payer: MEDICARE

## 2023-01-01 ENCOUNTER — HOSPITAL ENCOUNTER (INPATIENT)
Facility: HOSPITAL | Age: 88
LOS: 1 days | DRG: 309 | End: 2023-03-24
Attending: EMERGENCY MEDICINE | Admitting: INTERNAL MEDICINE
Payer: MEDICARE

## 2023-01-01 VITALS
WEIGHT: 154.32 LBS | SYSTOLIC BLOOD PRESSURE: 99 MMHG | RESPIRATION RATE: 20 BRPM | DIASTOLIC BLOOD PRESSURE: 45 MMHG | HEART RATE: 58 BPM | BODY MASS INDEX: 27.34 KG/M2 | OXYGEN SATURATION: 96 % | HEIGHT: 63 IN | TEMPERATURE: 97.3 F

## 2023-01-01 VITALS
SYSTOLIC BLOOD PRESSURE: 130 MMHG | HEART RATE: 57 BPM | OXYGEN SATURATION: 94 % | RESPIRATION RATE: 18 BRPM | WEIGHT: 142 LBS | BODY MASS INDEX: 24.24 KG/M2 | HEIGHT: 64 IN | TEMPERATURE: 97.8 F | DIASTOLIC BLOOD PRESSURE: 43 MMHG

## 2023-01-01 VITALS — BODY MASS INDEX: 24.76 KG/M2 | WEIGHT: 142 LBS

## 2023-01-01 DIAGNOSIS — R06.09 DYSPNEA ON EXERTION: ICD-10-CM

## 2023-01-01 DIAGNOSIS — E55.9 VITAMIN D DEFICIENCY DISEASE: ICD-10-CM

## 2023-01-01 DIAGNOSIS — E87.5 HYPERKALEMIA: ICD-10-CM

## 2023-01-01 DIAGNOSIS — C34.91 ADENOCARCINOMA OF RIGHT LUNG: ICD-10-CM

## 2023-01-01 DIAGNOSIS — N39.0 CHRONIC UTI: Primary | ICD-10-CM

## 2023-01-01 DIAGNOSIS — N39.0 RECURRENT UTI: Primary | ICD-10-CM

## 2023-01-01 DIAGNOSIS — R30.9 PAIN WITH URINATION: ICD-10-CM

## 2023-01-01 DIAGNOSIS — N76.0 AV (ANAEROBIC VAGINOSIS): ICD-10-CM

## 2023-01-01 DIAGNOSIS — J44.9 CHRONIC OBSTRUCTIVE PULMONARY DISEASE, UNSPECIFIED COPD TYPE: Primary | ICD-10-CM

## 2023-01-01 DIAGNOSIS — R30.9 PAIN EMPTYING BLADDER: ICD-10-CM

## 2023-01-01 DIAGNOSIS — B96.89 AV (ANAEROBIC VAGINOSIS): ICD-10-CM

## 2023-01-01 DIAGNOSIS — N18.30 STAGE 3 CHRONIC KIDNEY DISEASE, UNSPECIFIED WHETHER STAGE 3A OR 3B CKD: ICD-10-CM

## 2023-01-01 DIAGNOSIS — B37.2 CANDIDAL SKIN INFECTION: ICD-10-CM

## 2023-01-01 DIAGNOSIS — R30.9 PAIN WITH URINATION: Primary | ICD-10-CM

## 2023-01-01 DIAGNOSIS — N39.0 RECURRENT UTI: ICD-10-CM

## 2023-01-01 DIAGNOSIS — N18.30 STAGE 3 CHRONIC KIDNEY DISEASE, UNSPECIFIED WHETHER STAGE 3A OR 3B CKD: Primary | ICD-10-CM

## 2023-01-01 DIAGNOSIS — E11.65 TYPE 2 DIABETES MELLITUS WITH HYPERGLYCEMIA, UNSPECIFIED WHETHER LONG TERM INSULIN USE: ICD-10-CM

## 2023-01-01 DIAGNOSIS — N39.0 CHRONIC UTI: ICD-10-CM

## 2023-01-01 LAB
25(OH)D3 SERPL-MCNC: 17.8 NG/ML (ref 30–100)
ABO GROUP BLD: NORMAL
ABO GROUP BLD: NORMAL
ACETONE BLD QL: ABNORMAL
ALBUMIN SERPL-MCNC: 3.4 G/DL (ref 3.5–5.2)
ALBUMIN SERPL-MCNC: 3.4 G/DL (ref 3.5–5.2)
ALBUMIN SERPL-MCNC: 3.6 G/DL (ref 3.5–5.2)
ALBUMIN SERPL-MCNC: 4.2 G/DL (ref 3.5–5.2)
ALBUMIN/GLOB SERPL: 1.3 G/DL
ALBUMIN/GLOB SERPL: 1.4 G/DL
ALBUMIN/GLOB SERPL: 1.6 G/DL
ALP SERPL-CCNC: 108 U/L (ref 39–117)
ALP SERPL-CCNC: 85 U/L (ref 39–117)
ALP SERPL-CCNC: 95 U/L (ref 39–117)
ALT SERPL W P-5'-P-CCNC: 47 U/L (ref 1–33)
ALT SERPL W P-5'-P-CCNC: 53 U/L (ref 1–33)
ALT SERPL W P-5'-P-CCNC: 57 U/L (ref 1–33)
ANION GAP SERPL CALCULATED.3IONS-SCNC: 12.8 MMOL/L (ref 5–15)
ANION GAP SERPL CALCULATED.3IONS-SCNC: 14.5 MMOL/L (ref 5–15)
ANION GAP SERPL CALCULATED.3IONS-SCNC: 15.3 MMOL/L (ref 5–15)
ANION GAP SERPL CALCULATED.3IONS-SCNC: 15.6 MMOL/L (ref 5–15)
ANION GAP SERPL CALCULATED.3IONS-SCNC: 15.6 MMOL/L (ref 5–15)
ANION GAP SERPL CALCULATED.3IONS-SCNC: 16.1 MMOL/L (ref 5–15)
ANION GAP SERPL CALCULATED.3IONS-SCNC: 16.7 MMOL/L (ref 5–15)
ANION GAP SERPL CALCULATED.3IONS-SCNC: 17.8 MMOL/L (ref 5–15)
ANION GAP SERPL CALCULATED.3IONS-SCNC: 18.2 MMOL/L (ref 5–15)
ANION GAP SERPL CALCULATED.3IONS-SCNC: 19 MMOL/L (ref 5–15)
ANION GAP SERPL CALCULATED.3IONS-SCNC: 19.8 MMOL/L (ref 5–15)
ANION GAP SERPL CALCULATED.3IONS-SCNC: 20.7 MMOL/L (ref 5–15)
ANION GAP SERPL CALCULATED.3IONS-SCNC: 9.7 MMOL/L (ref 5–15)
AST SERPL-CCNC: 34 U/L (ref 1–32)
AST SERPL-CCNC: 36 U/L (ref 1–32)
AST SERPL-CCNC: 40 U/L (ref 1–32)
B-OH-BUTYR SERPL-SCNC: 0.23 MMOL/L (ref 0.02–0.27)
BACTERIA SPEC AEROBE CULT: ABNORMAL
BACTERIA SPEC AEROBE CULT: NO GROWTH
BACTERIA UR QL AUTO: ABNORMAL /HPF
BASOPHILS # BLD AUTO: 0.03 10*3/MM3 (ref 0–0.2)
BASOPHILS # BLD AUTO: 0.04 10*3/MM3 (ref 0–0.2)
BASOPHILS NFR BLD AUTO: 0.2 % (ref 0–1.5)
BASOPHILS NFR BLD AUTO: 0.2 % (ref 0–1.5)
BASOPHILS NFR BLD AUTO: 0.3 % (ref 0–1.5)
BASOPHILS NFR BLD AUTO: 0.5 % (ref 0–1.5)
BH CV ECHO MEAS - AO ROOT DIAM: 2.6 CM
BH CV ECHO MEAS - EDV(MOD-SP2): 36 ML
BH CV ECHO MEAS - EDV(MOD-SP4): 46 ML
BH CV ECHO MEAS - EF(MOD-BP): 62 %
BH CV ECHO MEAS - ESV(MOD-SP2): 14 ML
BH CV ECHO MEAS - ESV(MOD-SP4): 18 ML
BH CV ECHO MEAS - IVSD: 1.3 CM
BH CV ECHO MEAS - LA DIMENSION: 3.3 CM
BH CV ECHO MEAS - LAT PEAK E' VEL: 6.7 CM/SEC
BH CV ECHO MEAS - LVIDD: 3.1 CM
BH CV ECHO MEAS - LVOT DIAM: 2 CM
BH CV ECHO MEAS - LVPWD: 1.3 CM
BH CV ECHO MEAS - MED PEAK E' VEL: 6.6 CM/SEC
BH CV ECHO MEAS - MV A MAX VEL: 100 CM/SEC
BH CV ECHO MEAS - MV DEC TIME: 288 MSEC
BH CV ECHO MEAS - MV E MAX VEL: 96 CM/SEC
BH CV ECHO MEAS - MV E/A: 1
BH CV ECHO MEAS - RAP SYSTOLE: 15 MMHG
BH CV ECHO MEAS - RVDD: 1.9 CM
BH CV ECHO MEAS - RVSP: 52 MMHG
BH CV ECHO MEAS - TAPSE (>1.6): 1.3 CM
BH CV ECHO MEAS - TR MAX PG: 37 MMHG
BH CV ECHO MEAS - TR MAX VEL: 306 CM/SEC
BH CV ECHO MEASUREMENTS AVERAGE E/E' RATIO: 14.44
BILIRUB BLD-MCNC: NEGATIVE MG/DL
BILIRUB SERPL-MCNC: 0.2 MG/DL (ref 0–1.2)
BILIRUB SERPL-MCNC: 0.2 MG/DL (ref 0–1.2)
BILIRUB SERPL-MCNC: 0.3 MG/DL (ref 0–1.2)
BILIRUB UR QL STRIP: NEGATIVE
BILIRUB UR QL STRIP: NEGATIVE
BLD GP AB SCN SERPL QL: NEGATIVE
BUN SERPL-MCNC: 64 MG/DL (ref 8–23)
BUN SERPL-MCNC: 65 MG/DL (ref 8–23)
BUN SERPL-MCNC: 65 MG/DL (ref 8–23)
BUN SERPL-MCNC: 66 MG/DL (ref 8–23)
BUN SERPL-MCNC: 67 MG/DL (ref 8–23)
BUN SERPL-MCNC: 68 MG/DL (ref 8–23)
BUN SERPL-MCNC: 70 MG/DL (ref 8–23)
BUN SERPL-MCNC: 74 MG/DL (ref 8–23)
BUN/CREAT SERPL: 18.3 (ref 7–25)
BUN/CREAT SERPL: 18.4 (ref 7–25)
BUN/CREAT SERPL: 18.5 (ref 7–25)
BUN/CREAT SERPL: 18.6 (ref 7–25)
BUN/CREAT SERPL: 18.7 (ref 7–25)
BUN/CREAT SERPL: 19 (ref 7–25)
BUN/CREAT SERPL: 19.1 (ref 7–25)
BUN/CREAT SERPL: 19.1 (ref 7–25)
BUN/CREAT SERPL: 19.9 (ref 7–25)
BUN/CREAT SERPL: 19.9 (ref 7–25)
BUN/CREAT SERPL: 20.8 (ref 7–25)
BUN/CREAT SERPL: 21.5 (ref 7–25)
BUN/CREAT SERPL: 33.2 (ref 7–25)
CALCIUM SPEC-SCNC: 10.2 MG/DL (ref 8.2–9.6)
CALCIUM SPEC-SCNC: 8.1 MG/DL (ref 8.2–9.6)
CALCIUM SPEC-SCNC: 8.4 MG/DL (ref 8.2–9.6)
CALCIUM SPEC-SCNC: 8.7 MG/DL (ref 8.2–9.6)
CALCIUM SPEC-SCNC: 8.8 MG/DL (ref 8.2–9.6)
CALCIUM SPEC-SCNC: 8.8 MG/DL (ref 8.2–9.6)
CALCIUM SPEC-SCNC: 8.9 MG/DL (ref 8.2–9.6)
CALCIUM SPEC-SCNC: 9 MG/DL (ref 8.2–9.6)
CALCIUM SPEC-SCNC: 9.1 MG/DL (ref 8.2–9.6)
CHLORIDE SERPL-SCNC: 91 MMOL/L (ref 98–107)
CHLORIDE SERPL-SCNC: 92 MMOL/L (ref 98–107)
CHLORIDE SERPL-SCNC: 92 MMOL/L (ref 98–107)
CHLORIDE SERPL-SCNC: 93 MMOL/L (ref 98–107)
CHLORIDE SERPL-SCNC: 93 MMOL/L (ref 98–107)
CHLORIDE SERPL-SCNC: 94 MMOL/L (ref 98–107)
CHLORIDE SERPL-SCNC: 95 MMOL/L (ref 98–107)
CHLORIDE SERPL-SCNC: 98 MMOL/L (ref 98–107)
CLARITY UR: CLEAR
CLARITY UR: CLEAR
CLARITY, POC: CLEAR
CO2 SERPL-SCNC: 15.2 MMOL/L (ref 22–29)
CO2 SERPL-SCNC: 16.2 MMOL/L (ref 22–29)
CO2 SERPL-SCNC: 16.7 MMOL/L (ref 22–29)
CO2 SERPL-SCNC: 16.8 MMOL/L (ref 22–29)
CO2 SERPL-SCNC: 17 MMOL/L (ref 22–29)
CO2 SERPL-SCNC: 17.3 MMOL/L (ref 22–29)
CO2 SERPL-SCNC: 17.3 MMOL/L (ref 22–29)
CO2 SERPL-SCNC: 17.9 MMOL/L (ref 22–29)
CO2 SERPL-SCNC: 18.4 MMOL/L (ref 22–29)
CO2 SERPL-SCNC: 18.4 MMOL/L (ref 22–29)
CO2 SERPL-SCNC: 19.5 MMOL/L (ref 22–29)
CO2 SERPL-SCNC: 21.2 MMOL/L (ref 22–29)
CO2 SERPL-SCNC: 27.3 MMOL/L (ref 22–29)
COLOR UR: YELLOW
CORTIS SERPL-MCNC: 50.31 MCG/DL
CREAT SERPL-MCNC: 2.23 MG/DL (ref 0.57–1)
CREAT SERPL-MCNC: 3.26 MG/DL (ref 0.57–1)
CREAT SERPL-MCNC: 3.27 MG/DL (ref 0.57–1)
CREAT SERPL-MCNC: 3.41 MG/DL (ref 0.57–1)
CREAT SERPL-MCNC: 3.41 MG/DL (ref 0.57–1)
CREAT SERPL-MCNC: 3.46 MG/DL (ref 0.57–1)
CREAT SERPL-MCNC: 3.47 MG/DL (ref 0.57–1)
CREAT SERPL-MCNC: 3.51 MG/DL (ref 0.57–1)
CREAT SERPL-MCNC: 3.52 MG/DL (ref 0.57–1)
CREAT SERPL-MCNC: 3.54 MG/DL (ref 0.57–1)
CREAT SERPL-MCNC: 3.56 MG/DL (ref 0.57–1)
CREAT SERPL-MCNC: 3.61 MG/DL (ref 0.57–1)
CREAT SERPL-MCNC: 3.61 MG/DL (ref 0.57–1)
CREAT UR-MCNC: 29.6 MG/DL
D-LACTATE SERPL-SCNC: 1.6 MMOL/L (ref 0.5–2)
D-LACTATE SERPL-SCNC: 2.3 MMOL/L (ref 0.5–2)
D-LACTATE SERPL-SCNC: 2.3 MMOL/L (ref 0.5–2)
D-LACTATE SERPL-SCNC: 3.6 MMOL/L (ref 0.5–2)
D-LACTATE SERPL-SCNC: 4.1 MMOL/L (ref 0.5–2)
D-LACTATE SERPL-SCNC: 4.9 MMOL/L (ref 0.5–2)
D-LACTATE SERPL-SCNC: 5.8 MMOL/L (ref 0.5–2)
D-LACTATE SERPL-SCNC: 6.1 MMOL/L (ref 0.5–2)
D-LACTATE SERPL-SCNC: 6.5 MMOL/L (ref 0.5–2)
D-LACTATE SERPL-SCNC: <0.2 MMOL/L (ref 0.5–2)
DEPRECATED RDW RBC AUTO: 42.5 FL (ref 37–54)
DEPRECATED RDW RBC AUTO: 43.8 FL (ref 37–54)
DEPRECATED RDW RBC AUTO: 43.8 FL (ref 37–54)
DEPRECATED RDW RBC AUTO: 44.9 FL (ref 37–54)
DEPRECATED RDW RBC AUTO: 45.7 FL (ref 37–54)
EGFRCR SERPLBLD CKD-EPI 2021: 11.3 ML/MIN/1.73
EGFRCR SERPLBLD CKD-EPI 2021: 11.3 ML/MIN/1.73
EGFRCR SERPLBLD CKD-EPI 2021: 11.5 ML/MIN/1.73
EGFRCR SERPLBLD CKD-EPI 2021: 11.6 ML/MIN/1.73
EGFRCR SERPLBLD CKD-EPI 2021: 11.7 ML/MIN/1.73
EGFRCR SERPLBLD CKD-EPI 2021: 11.7 ML/MIN/1.73
EGFRCR SERPLBLD CKD-EPI 2021: 11.9 ML/MIN/1.73
EGFRCR SERPLBLD CKD-EPI 2021: 11.9 ML/MIN/1.73
EGFRCR SERPLBLD CKD-EPI 2021: 12.2 ML/MIN/1.73
EGFRCR SERPLBLD CKD-EPI 2021: 12.2 ML/MIN/1.73
EGFRCR SERPLBLD CKD-EPI 2021: 12.8 ML/MIN/1.73
EGFRCR SERPLBLD CKD-EPI 2021: 12.8 ML/MIN/1.73
EGFRCR SERPLBLD CKD-EPI 2021: 20.2 ML/MIN/1.73
EOSINOPHIL # BLD AUTO: 0 10*3/MM3 (ref 0–0.4)
EOSINOPHIL # BLD AUTO: 0.01 10*3/MM3 (ref 0–0.4)
EOSINOPHIL # BLD AUTO: 0.04 10*3/MM3 (ref 0–0.4)
EOSINOPHIL # BLD AUTO: 0.26 10*3/MM3 (ref 0–0.4)
EOSINOPHIL NFR BLD AUTO: 0 % (ref 0.3–6.2)
EOSINOPHIL NFR BLD AUTO: 0.1 % (ref 0.3–6.2)
EOSINOPHIL NFR BLD AUTO: 0.2 % (ref 0.3–6.2)
EOSINOPHIL NFR BLD AUTO: 3.9 % (ref 0.3–6.2)
ERYTHROCYTE [DISTWIDTH] IN BLOOD BY AUTOMATED COUNT: 13 % (ref 12.3–15.4)
ERYTHROCYTE [DISTWIDTH] IN BLOOD BY AUTOMATED COUNT: 13 % (ref 12.3–15.4)
ERYTHROCYTE [DISTWIDTH] IN BLOOD BY AUTOMATED COUNT: 13.1 % (ref 12.3–15.4)
ERYTHROCYTE [DISTWIDTH] IN BLOOD BY AUTOMATED COUNT: 13.4 % (ref 12.3–15.4)
ERYTHROCYTE [DISTWIDTH] IN BLOOD BY AUTOMATED COUNT: 13.7 % (ref 12.3–15.4)
EXPIRATION DATE: ABNORMAL
GEN 5 2HR TROPONIN T REFLEX: 557 NG/L
GLOBULIN UR ELPH-MCNC: 2.2 GM/DL
GLOBULIN UR ELPH-MCNC: 2.5 GM/DL
GLOBULIN UR ELPH-MCNC: 2.7 GM/DL
GLUCOSE BLDC GLUCOMTR-MCNC: 103 MG/DL (ref 70–99)
GLUCOSE BLDC GLUCOMTR-MCNC: 113 MG/DL (ref 70–99)
GLUCOSE BLDC GLUCOMTR-MCNC: 121 MG/DL (ref 70–99)
GLUCOSE BLDC GLUCOMTR-MCNC: 125 MG/DL (ref 70–99)
GLUCOSE BLDC GLUCOMTR-MCNC: 132 MG/DL (ref 70–99)
GLUCOSE BLDC GLUCOMTR-MCNC: 133 MG/DL (ref 70–99)
GLUCOSE BLDC GLUCOMTR-MCNC: 144 MG/DL (ref 70–99)
GLUCOSE BLDC GLUCOMTR-MCNC: 155 MG/DL (ref 70–99)
GLUCOSE BLDC GLUCOMTR-MCNC: 155 MG/DL (ref 70–99)
GLUCOSE BLDC GLUCOMTR-MCNC: 160 MG/DL (ref 70–99)
GLUCOSE BLDC GLUCOMTR-MCNC: 166 MG/DL (ref 70–99)
GLUCOSE BLDC GLUCOMTR-MCNC: 168 MG/DL (ref 70–99)
GLUCOSE BLDC GLUCOMTR-MCNC: 174 MG/DL (ref 70–99)
GLUCOSE BLDC GLUCOMTR-MCNC: 181 MG/DL (ref 70–99)
GLUCOSE BLDC GLUCOMTR-MCNC: 208 MG/DL (ref 70–99)
GLUCOSE BLDC GLUCOMTR-MCNC: 289 MG/DL (ref 70–99)
GLUCOSE BLDC GLUCOMTR-MCNC: 335 MG/DL (ref 70–99)
GLUCOSE BLDC GLUCOMTR-MCNC: 357 MG/DL (ref 70–99)
GLUCOSE BLDC GLUCOMTR-MCNC: 388 MG/DL (ref 70–99)
GLUCOSE BLDC GLUCOMTR-MCNC: 397 MG/DL (ref 70–99)
GLUCOSE BLDC GLUCOMTR-MCNC: 453 MG/DL (ref 70–99)
GLUCOSE BLDC GLUCOMTR-MCNC: 460 MG/DL (ref 70–99)
GLUCOSE BLDC GLUCOMTR-MCNC: 479 MG/DL (ref 70–99)
GLUCOSE BLDC GLUCOMTR-MCNC: 50 MG/DL (ref 70–99)
GLUCOSE BLDC GLUCOMTR-MCNC: 67 MG/DL (ref 70–99)
GLUCOSE BLDC GLUCOMTR-MCNC: 75 MG/DL (ref 70–99)
GLUCOSE BLDC GLUCOMTR-MCNC: 76 MG/DL (ref 70–99)
GLUCOSE BLDC GLUCOMTR-MCNC: 80 MG/DL (ref 70–99)
GLUCOSE BLDC GLUCOMTR-MCNC: 91 MG/DL (ref 70–99)
GLUCOSE BLDC GLUCOMTR-MCNC: 95 MG/DL (ref 70–99)
GLUCOSE SERPL-MCNC: 135 MG/DL (ref 65–99)
GLUCOSE SERPL-MCNC: 149 MG/DL (ref 65–99)
GLUCOSE SERPL-MCNC: 149 MG/DL (ref 65–99)
GLUCOSE SERPL-MCNC: 168 MG/DL (ref 65–99)
GLUCOSE SERPL-MCNC: 169 MG/DL (ref 65–99)
GLUCOSE SERPL-MCNC: 257 MG/DL (ref 65–99)
GLUCOSE SERPL-MCNC: 274 MG/DL (ref 65–99)
GLUCOSE SERPL-MCNC: 317 MG/DL (ref 65–99)
GLUCOSE SERPL-MCNC: 343 MG/DL (ref 65–99)
GLUCOSE SERPL-MCNC: 372 MG/DL (ref 65–99)
GLUCOSE SERPL-MCNC: 468 MG/DL (ref 65–99)
GLUCOSE SERPL-MCNC: 505 MG/DL (ref 65–99)
GLUCOSE SERPL-MCNC: 81 MG/DL (ref 65–99)
GLUCOSE UR STRIP-MCNC: ABNORMAL MG/DL
GLUCOSE UR STRIP-MCNC: NEGATIVE MG/DL
GLUCOSE UR STRIP-MCNC: NEGATIVE MG/DL
HCT VFR BLD AUTO: 38.4 % (ref 34–46.6)
HCT VFR BLD AUTO: 38.7 % (ref 34–46.6)
HCT VFR BLD AUTO: 39.6 % (ref 34–46.6)
HCT VFR BLD AUTO: 40.5 % (ref 34–46.6)
HCT VFR BLD AUTO: 41.3 % (ref 34–46.6)
HGB BLD-MCNC: 12.3 G/DL (ref 12–15.9)
HGB BLD-MCNC: 12.6 G/DL (ref 12–15.9)
HGB BLD-MCNC: 12.8 G/DL (ref 12–15.9)
HGB BLD-MCNC: 12.9 G/DL (ref 12–15.9)
HGB BLD-MCNC: 13 G/DL (ref 12–15.9)
HGB UR QL STRIP.AUTO: ABNORMAL
HGB UR QL STRIP.AUTO: NEGATIVE
HOLD SPECIMEN: NORMAL
HOLD SPECIMEN: NORMAL
HYALINE CASTS UR QL AUTO: ABNORMAL /LPF
IMM GRANULOCYTES # BLD AUTO: 0.02 10*3/MM3 (ref 0–0.05)
IMM GRANULOCYTES # BLD AUTO: 0.05 10*3/MM3 (ref 0–0.05)
IMM GRANULOCYTES # BLD AUTO: 0.11 10*3/MM3 (ref 0–0.05)
IMM GRANULOCYTES # BLD AUTO: 0.14 10*3/MM3 (ref 0–0.05)
IMM GRANULOCYTES NFR BLD AUTO: 0.3 % (ref 0–0.5)
IMM GRANULOCYTES NFR BLD AUTO: 0.4 % (ref 0–0.5)
IMM GRANULOCYTES NFR BLD AUTO: 0.6 % (ref 0–0.5)
IMM GRANULOCYTES NFR BLD AUTO: 0.7 % (ref 0–0.5)
INR PPP: 1.03 (ref 0.86–1.15)
IVRT: 85 MSEC
KETONES UR QL STRIP: ABNORMAL
KETONES UR QL STRIP: NEGATIVE
KETONES UR QL: NEGATIVE
LEFT ATRIUM VOLUME INDEX: 26 ML/M2
LEUKOCYTE EST, POC: NEGATIVE
LEUKOCYTE ESTERASE UR QL STRIP.AUTO: ABNORMAL
LEUKOCYTE ESTERASE UR QL STRIP.AUTO: NEGATIVE
LIPASE SERPL-CCNC: 6 U/L (ref 13–60)
LYMPHOCYTES # BLD AUTO: 0.64 10*3/MM3 (ref 0.7–3.1)
LYMPHOCYTES # BLD AUTO: 1.26 10*3/MM3 (ref 0.7–3.1)
LYMPHOCYTES # BLD AUTO: 1.45 10*3/MM3 (ref 0.7–3.1)
LYMPHOCYTES # BLD AUTO: 1.77 10*3/MM3 (ref 0.7–3.1)
LYMPHOCYTES NFR BLD AUTO: 15.2 % (ref 19.6–45.3)
LYMPHOCYTES NFR BLD AUTO: 21.9 % (ref 19.6–45.3)
LYMPHOCYTES NFR BLD AUTO: 3.4 % (ref 19.6–45.3)
LYMPHOCYTES NFR BLD AUTO: 6 % (ref 19.6–45.3)
Lab: ABNORMAL
MAGNESIUM SERPL-MCNC: 1.7 MG/DL (ref 1.7–2.3)
MAGNESIUM SERPL-MCNC: 1.8 MG/DL (ref 1.7–2.3)
MAGNESIUM SERPL-MCNC: 2 MG/DL (ref 1.7–2.3)
MAGNESIUM SERPL-MCNC: 2.1 MG/DL (ref 1.7–2.3)
MAGNESIUM SERPL-MCNC: 2.2 MG/DL (ref 1.7–2.3)
MAGNESIUM SERPL-MCNC: 2.3 MG/DL (ref 1.7–2.3)
MAGNESIUM SERPL-MCNC: 3.6 MG/DL (ref 1.7–2.3)
MAXIMAL PREDICTED HEART RATE: 128 BPM
MCH RBC QN AUTO: 28.6 PG (ref 26.6–33)
MCH RBC QN AUTO: 29 PG (ref 26.6–33)
MCH RBC QN AUTO: 29.1 PG (ref 26.6–33)
MCH RBC QN AUTO: 29.4 PG (ref 26.6–33)
MCH RBC QN AUTO: 29.6 PG (ref 26.6–33)
MCHC RBC AUTO-ENTMCNC: 31.2 G/DL (ref 31.5–35.7)
MCHC RBC AUTO-ENTMCNC: 31.8 G/DL (ref 31.5–35.7)
MCHC RBC AUTO-ENTMCNC: 32 G/DL (ref 31.5–35.7)
MCHC RBC AUTO-ENTMCNC: 32.1 G/DL (ref 31.5–35.7)
MCHC RBC AUTO-ENTMCNC: 33.1 G/DL (ref 31.5–35.7)
MCV RBC AUTO: 89 FL (ref 79–97)
MCV RBC AUTO: 89.6 FL (ref 79–97)
MCV RBC AUTO: 91.2 FL (ref 79–97)
MCV RBC AUTO: 91.9 FL (ref 79–97)
MCV RBC AUTO: 93.2 FL (ref 79–97)
MONOCYTES # BLD AUTO: 0.45 10*3/MM3 (ref 0.1–0.9)
MONOCYTES # BLD AUTO: 0.71 10*3/MM3 (ref 0.1–0.9)
MONOCYTES # BLD AUTO: 1.33 10*3/MM3 (ref 0.1–0.9)
MONOCYTES # BLD AUTO: 1.59 10*3/MM3 (ref 0.1–0.9)
MONOCYTES NFR BLD AUTO: 6.1 % (ref 5–12)
MONOCYTES NFR BLD AUTO: 6.3 % (ref 5–12)
MONOCYTES NFR BLD AUTO: 6.8 % (ref 5–12)
MONOCYTES NFR BLD AUTO: 8.4 % (ref 5–12)
NEUTROPHILS NFR BLD AUTO: 16.66 10*3/MM3 (ref 1.7–7)
NEUTROPHILS NFR BLD AUTO: 18.3 10*3/MM3 (ref 1.7–7)
NEUTROPHILS NFR BLD AUTO: 4.4 10*3/MM3 (ref 1.7–7)
NEUTROPHILS NFR BLD AUTO: 66.6 % (ref 42.7–76)
NEUTROPHILS NFR BLD AUTO: 77.9 % (ref 42.7–76)
NEUTROPHILS NFR BLD AUTO: 86.6 % (ref 42.7–76)
NEUTROPHILS NFR BLD AUTO: 87.4 % (ref 42.7–76)
NEUTROPHILS NFR BLD AUTO: 9.11 10*3/MM3 (ref 1.7–7)
NITRITE UR QL STRIP: NEGATIVE
NITRITE UR QL STRIP: NEGATIVE
NITRITE UR-MCNC: NEGATIVE MG/ML
NRBC BLD AUTO-RTO: 0 /100 WBC (ref 0–0.2)
NT-PROBNP SERPL-MCNC: ABNORMAL PG/ML (ref 0–1800)
NT-PROBNP SERPL-MCNC: ABNORMAL PG/ML (ref 0–1800)
OSMOLALITY SERPL: 310 MOSM/KG (ref 280–301)
PH UR STRIP.AUTO: 6 [PH] (ref 5–8)
PH UR STRIP.AUTO: <=5 [PH] (ref 5–8)
PH UR: 6 [PH] (ref 5–8)
PHOSPHATE SERPL-MCNC: 3.7 MG/DL (ref 2.5–4.5)
PHOSPHATE SERPL-MCNC: 4.4 MG/DL (ref 2.5–4.5)
PHOSPHATE SERPL-MCNC: 4.8 MG/DL (ref 2.5–4.5)
PHOSPHATE SERPL-MCNC: 4.8 MG/DL (ref 2.5–4.5)
PHOSPHATE SERPL-MCNC: 5.2 MG/DL (ref 2.5–4.5)
PHOSPHATE SERPL-MCNC: 5.4 MG/DL (ref 2.5–4.5)
PHOSPHATE SERPL-MCNC: 5.8 MG/DL (ref 2.5–4.5)
PHOSPHATE SERPL-MCNC: 5.8 MG/DL (ref 2.5–4.5)
PLATELET # BLD AUTO: 190 10*3/MM3 (ref 140–450)
PLATELET # BLD AUTO: 210 10*3/MM3 (ref 140–450)
PLATELET # BLD AUTO: 213 10*3/MM3 (ref 140–450)
PLATELET # BLD AUTO: 229 10*3/MM3 (ref 140–450)
PLATELET # BLD AUTO: 243 10*3/MM3 (ref 140–450)
PMV BLD AUTO: 9.1 FL (ref 6–12)
PMV BLD AUTO: 9.3 FL (ref 6–12)
PMV BLD AUTO: 9.4 FL (ref 6–12)
PMV BLD AUTO: 9.4 FL (ref 6–12)
PMV BLD AUTO: 9.6 FL (ref 6–12)
POTASSIUM SERPL-SCNC: 4.1 MMOL/L (ref 3.5–5.2)
POTASSIUM SERPL-SCNC: 4.3 MMOL/L (ref 3.5–5.2)
POTASSIUM SERPL-SCNC: 4.4 MMOL/L (ref 3.5–5.2)
POTASSIUM SERPL-SCNC: 4.4 MMOL/L (ref 3.5–5.2)
POTASSIUM SERPL-SCNC: 4.5 MMOL/L (ref 3.5–5.2)
POTASSIUM SERPL-SCNC: 4.9 MMOL/L (ref 3.5–5.2)
POTASSIUM SERPL-SCNC: 4.9 MMOL/L (ref 3.5–5.2)
POTASSIUM SERPL-SCNC: 5.1 MMOL/L (ref 3.5–5.2)
POTASSIUM SERPL-SCNC: 5.8 MMOL/L (ref 3.5–5.2)
PROCALCITONIN SERPL-MCNC: 3.32 NG/ML (ref 0–0.25)
PROT ?TM UR-MCNC: 11.7 MG/DL
PROT SERPL-MCNC: 5.8 G/DL (ref 6–8.5)
PROT SERPL-MCNC: 5.9 G/DL (ref 6–8.5)
PROT SERPL-MCNC: 6.1 G/DL (ref 6–8.5)
PROT UR QL STRIP: ABNORMAL
PROT UR QL STRIP: NEGATIVE
PROT UR STRIP-MCNC: NEGATIVE MG/DL
PROT/CREAT UR: 0.4 MG/G{CREAT}
PROTHROMBIN TIME: 13.6 SECONDS (ref 11.8–14.9)
PTH-INTACT SERPL-MCNC: 143 PG/ML (ref 15–65)
RBC # BLD AUTO: 4.18 10*6/MM3 (ref 3.77–5.28)
RBC # BLD AUTO: 4.32 10*6/MM3 (ref 3.77–5.28)
RBC # BLD AUTO: 4.34 10*6/MM3 (ref 3.77–5.28)
RBC # BLD AUTO: 4.43 10*6/MM3 (ref 3.77–5.28)
RBC # BLD AUTO: 4.55 10*6/MM3 (ref 3.77–5.28)
RBC # UR STRIP: ABNORMAL /HPF
RBC # UR STRIP: ABNORMAL /UL
REF LAB TEST METHOD: ABNORMAL
RH BLD: NEGATIVE
RH BLD: NEGATIVE
SODIUM SERPL-SCNC: 124 MMOL/L (ref 136–145)
SODIUM SERPL-SCNC: 125 MMOL/L (ref 136–145)
SODIUM SERPL-SCNC: 126 MMOL/L (ref 136–145)
SODIUM SERPL-SCNC: 127 MMOL/L (ref 136–145)
SODIUM SERPL-SCNC: 128 MMOL/L (ref 136–145)
SODIUM SERPL-SCNC: 129 MMOL/L (ref 136–145)
SODIUM SERPL-SCNC: 131 MMOL/L (ref 136–145)
SODIUM SERPL-SCNC: 133 MMOL/L (ref 136–145)
SODIUM SERPL-SCNC: 135 MMOL/L (ref 136–145)
SP GR UR STRIP: 1.01 (ref 1–1.03)
SP GR UR STRIP: >=1.03 (ref 1–1.03)
SP GR UR: 1.03 (ref 1–1.03)
SQUAMOUS #/AREA URNS HPF: ABNORMAL /HPF
STRESS TARGET HR: 109 BPM
T&S EXPIRATION DATE: NORMAL
TROPONIN T DELTA: 13 NG/L
TROPONIN T SERPL HS-MCNC: 174 NG/L
TROPONIN T SERPL HS-MCNC: 482 NG/L
TROPONIN T SERPL HS-MCNC: 544 NG/L
UROBILINOGEN UR QL STRIP: ABNORMAL
UROBILINOGEN UR QL STRIP: ABNORMAL
UROBILINOGEN UR QL: ABNORMAL
WBC # UR STRIP: ABNORMAL /HPF
WBC NRBC COR # BLD: 11.68 10*3/MM3 (ref 3.4–10.8)
WBC NRBC COR # BLD: 19.03 10*3/MM3 (ref 3.4–10.8)
WBC NRBC COR # BLD: 19.7 10*3/MM3 (ref 3.4–10.8)
WBC NRBC COR # BLD: 21.11 10*3/MM3 (ref 3.4–10.8)
WBC NRBC COR # BLD: 6.61 10*3/MM3 (ref 3.4–10.8)
WHOLE BLOOD HOLD COAG: NORMAL
WHOLE BLOOD HOLD SPECIMEN: NORMAL
YEAST URNS QL MICRO: ABNORMAL /HPF

## 2023-01-01 PROCEDURE — 71045 X-RAY EXAM CHEST 1 VIEW: CPT

## 2023-01-01 PROCEDURE — 93010 ELECTROCARDIOGRAM REPORT: CPT | Performed by: INTERNAL MEDICINE

## 2023-01-01 PROCEDURE — 80053 COMPREHEN METABOLIC PANEL: CPT | Performed by: EMERGENCY MEDICINE

## 2023-01-01 PROCEDURE — 83605 ASSAY OF LACTIC ACID: CPT | Performed by: PHYSICIAN ASSISTANT

## 2023-01-01 PROCEDURE — 85025 COMPLETE CBC W/AUTO DIFF WBC: CPT | Performed by: INTERNAL MEDICINE

## 2023-01-01 PROCEDURE — 36415 COLL VENOUS BLD VENIPUNCTURE: CPT

## 2023-01-01 PROCEDURE — 84100 ASSAY OF PHOSPHORUS: CPT | Performed by: PHYSICIAN ASSISTANT

## 2023-01-01 PROCEDURE — 83735 ASSAY OF MAGNESIUM: CPT | Performed by: PHYSICIAN ASSISTANT

## 2023-01-01 PROCEDURE — 87077 CULTURE AEROBIC IDENTIFY: CPT | Performed by: NURSE PRACTITIONER

## 2023-01-01 PROCEDURE — 99285 EMERGENCY DEPT VISIT HI MDM: CPT

## 2023-01-01 PROCEDURE — 87086 URINE CULTURE/COLONY COUNT: CPT | Performed by: INTERNAL MEDICINE

## 2023-01-01 PROCEDURE — 99214 OFFICE O/P EST MOD 30 MIN: CPT | Performed by: NURSE PRACTITIONER

## 2023-01-01 PROCEDURE — 83970 ASSAY OF PARATHORMONE: CPT

## 2023-01-01 PROCEDURE — 84484 ASSAY OF TROPONIN QUANT: CPT | Performed by: EMERGENCY MEDICINE

## 2023-01-01 PROCEDURE — 93005 ELECTROCARDIOGRAM TRACING: CPT | Performed by: EMERGENCY MEDICINE

## 2023-01-01 PROCEDURE — 85025 COMPLETE CBC W/AUTO DIFF WBC: CPT

## 2023-01-01 PROCEDURE — 82010 KETONE BODYS QUAN: CPT | Performed by: PHYSICIAN ASSISTANT

## 2023-01-01 PROCEDURE — 76775 US EXAM ABDO BACK WALL LIM: CPT

## 2023-01-01 PROCEDURE — 93306 TTE W/DOPPLER COMPLETE: CPT | Performed by: INTERNAL MEDICINE

## 2023-01-01 PROCEDURE — 51700 IRRIGATION OF BLADDER: CPT | Performed by: NURSE PRACTITIONER

## 2023-01-01 PROCEDURE — 82962 GLUCOSE BLOOD TEST: CPT

## 2023-01-01 PROCEDURE — 25010000002 FUROSEMIDE PER 20 MG: Performed by: PHYSICIAN ASSISTANT

## 2023-01-01 PROCEDURE — 25010000002 HYDROMORPHONE 1 MG/ML SOLUTION: Performed by: INTERNAL MEDICINE

## 2023-01-01 PROCEDURE — 86901 BLOOD TYPING SEROLOGIC RH(D): CPT | Performed by: EMERGENCY MEDICINE

## 2023-01-01 PROCEDURE — 93005 ELECTROCARDIOGRAM TRACING: CPT | Performed by: INTERNAL MEDICINE

## 2023-01-01 PROCEDURE — 80053 COMPREHEN METABOLIC PANEL: CPT | Performed by: PHYSICIAN ASSISTANT

## 2023-01-01 PROCEDURE — 94799 UNLISTED PULMONARY SVC/PX: CPT

## 2023-01-01 PROCEDURE — 25010000002 DOPAMINE PER 40 MG: Performed by: EMERGENCY MEDICINE

## 2023-01-01 PROCEDURE — 25010000002 PIPERACILLIN SOD-TAZOBACTAM PER 1 G: Performed by: INTERNAL MEDICINE

## 2023-01-01 PROCEDURE — 87086 URINE CULTURE/COLONY COUNT: CPT | Performed by: NURSE PRACTITIONER

## 2023-01-01 PROCEDURE — 25010000002 MORPHINE PER 10 MG: Performed by: INTERNAL MEDICINE

## 2023-01-01 PROCEDURE — 83690 ASSAY OF LIPASE: CPT | Performed by: INTERNAL MEDICINE

## 2023-01-01 PROCEDURE — 86900 BLOOD TYPING SEROLOGIC ABO: CPT

## 2023-01-01 PROCEDURE — 84484 ASSAY OF TROPONIN QUANT: CPT | Performed by: INTERNAL MEDICINE

## 2023-01-01 PROCEDURE — 36415 COLL VENOUS BLD VENIPUNCTURE: CPT | Performed by: EMERGENCY MEDICINE

## 2023-01-01 PROCEDURE — 81003 URINALYSIS AUTO W/O SCOPE: CPT | Performed by: NURSE PRACTITIONER

## 2023-01-01 PROCEDURE — 25010000002 MAGNESIUM SULFATE 2 GM/50ML SOLUTION: Performed by: PHYSICIAN ASSISTANT

## 2023-01-01 PROCEDURE — 74176 CT ABD & PELVIS W/O CONTRAST: CPT

## 2023-01-01 PROCEDURE — 25010000002 ONDANSETRON PER 1 MG: Performed by: INTERNAL MEDICINE

## 2023-01-01 PROCEDURE — 84100 ASSAY OF PHOSPHORUS: CPT | Performed by: INTERNAL MEDICINE

## 2023-01-01 PROCEDURE — 83735 ASSAY OF MAGNESIUM: CPT | Performed by: INTERNAL MEDICINE

## 2023-01-01 PROCEDURE — 94761 N-INVAS EAR/PLS OXIMETRY MLT: CPT

## 2023-01-01 PROCEDURE — 94640 AIRWAY INHALATION TREATMENT: CPT

## 2023-01-01 PROCEDURE — 25010000002 CALCIUM GLUCONATE-NACL 1-0.675 GM/50ML-% SOLUTION: Performed by: EMERGENCY MEDICINE

## 2023-01-01 PROCEDURE — 25010000002 DOPAMINE PER 40 MG

## 2023-01-01 PROCEDURE — 83930 ASSAY OF BLOOD OSMOLALITY: CPT | Performed by: PHYSICIAN ASSISTANT

## 2023-01-01 PROCEDURE — 99291 CRITICAL CARE FIRST HOUR: CPT | Performed by: INTERNAL MEDICINE

## 2023-01-01 PROCEDURE — 93306 TTE W/DOPPLER COMPLETE: CPT

## 2023-01-01 PROCEDURE — 82570 ASSAY OF URINE CREATININE: CPT

## 2023-01-01 PROCEDURE — 25010000002 ATROPINE SULFATE

## 2023-01-01 PROCEDURE — 99233 SBSQ HOSP IP/OBS HIGH 50: CPT | Performed by: INTERNAL MEDICINE

## 2023-01-01 PROCEDURE — 86900 BLOOD TYPING SEROLOGIC ABO: CPT | Performed by: EMERGENCY MEDICINE

## 2023-01-01 PROCEDURE — 83605 ASSAY OF LACTIC ACID: CPT | Performed by: EMERGENCY MEDICINE

## 2023-01-01 PROCEDURE — 80069 RENAL FUNCTION PANEL: CPT

## 2023-01-01 PROCEDURE — 82009 KETONE BODYS QUAL: CPT | Performed by: PHYSICIAN ASSISTANT

## 2023-01-01 PROCEDURE — 85027 COMPLETE CBC AUTOMATED: CPT | Performed by: INTERNAL MEDICINE

## 2023-01-01 PROCEDURE — 87040 BLOOD CULTURE FOR BACTERIA: CPT | Performed by: EMERGENCY MEDICINE

## 2023-01-01 PROCEDURE — 86901 BLOOD TYPING SEROLOGIC RH(D): CPT

## 2023-01-01 PROCEDURE — 81001 URINALYSIS AUTO W/SCOPE: CPT | Performed by: INTERNAL MEDICINE

## 2023-01-01 PROCEDURE — 99222 1ST HOSP IP/OBS MODERATE 55: CPT | Performed by: INTERNAL MEDICINE

## 2023-01-01 PROCEDURE — 83880 ASSAY OF NATRIURETIC PEPTIDE: CPT | Performed by: INTERNAL MEDICINE

## 2023-01-01 PROCEDURE — 63710000001 INSULIN REGULAR HUMAN PER 5 UNITS: Performed by: EMERGENCY MEDICINE

## 2023-01-01 PROCEDURE — 86850 RBC ANTIBODY SCREEN: CPT | Performed by: EMERGENCY MEDICINE

## 2023-01-01 PROCEDURE — 82533 TOTAL CORTISOL: CPT | Performed by: INTERNAL MEDICINE

## 2023-01-01 PROCEDURE — 85610 PROTHROMBIN TIME: CPT | Performed by: INTERNAL MEDICINE

## 2023-01-01 PROCEDURE — 84145 PROCALCITONIN (PCT): CPT | Performed by: PHYSICIAN ASSISTANT

## 2023-01-01 PROCEDURE — 87186 SC STD MICRODIL/AGAR DIL: CPT | Performed by: NURSE PRACTITIONER

## 2023-01-01 PROCEDURE — 85025 COMPLETE CBC W/AUTO DIFF WBC: CPT | Performed by: EMERGENCY MEDICINE

## 2023-01-01 PROCEDURE — 83735 ASSAY OF MAGNESIUM: CPT | Performed by: EMERGENCY MEDICINE

## 2023-01-01 PROCEDURE — 84156 ASSAY OF PROTEIN URINE: CPT

## 2023-01-01 PROCEDURE — 82306 VITAMIN D 25 HYDROXY: CPT

## 2023-01-01 PROCEDURE — 80053 COMPREHEN METABOLIC PANEL: CPT | Performed by: INTERNAL MEDICINE

## 2023-01-01 PROCEDURE — 93005 ELECTROCARDIOGRAM TRACING: CPT

## 2023-01-01 PROCEDURE — 81003 URINALYSIS AUTO W/O SCOPE: CPT

## 2023-01-01 PROCEDURE — 25010000002 PIPERACILLIN SOD-TAZOBACTAM PER 1 G: Performed by: EMERGENCY MEDICINE

## 2023-01-01 RX ORDER — DEXTROSE MONOHYDRATE 25 G/50ML
10-50 INJECTION, SOLUTION INTRAVENOUS
Status: DISCONTINUED | OUTPATIENT
Start: 2023-01-01 | End: 2023-01-01

## 2023-01-01 RX ORDER — HEPARIN SODIUM 10000 [USP'U]/ML
60 INJECTION, SOLUTION INTRAVENOUS; SUBCUTANEOUS ONCE
Status: COMPLETED | OUTPATIENT
Start: 2023-01-01 | End: 2023-01-01

## 2023-01-01 RX ORDER — LISINOPRIL 10 MG/1
10 TABLET ORAL DAILY
COMMUNITY
Start: 2023-01-01 | End: 2024-02-09

## 2023-01-01 RX ORDER — SODIUM CHLORIDE 0.9 % (FLUSH) 0.9 %
10 SYRINGE (ML) INJECTION AS NEEDED
Status: DISCONTINUED | OUTPATIENT
Start: 2023-01-01 | End: 2023-01-01

## 2023-01-01 RX ORDER — NICOTINE POLACRILEX 4 MG
15 LOZENGE BUCCAL
Status: DISCONTINUED | OUTPATIENT
Start: 2023-01-01 | End: 2023-01-01

## 2023-01-01 RX ORDER — SODIUM CHLORIDE 9 MG/ML
250 INJECTION, SOLUTION INTRAVENOUS CONTINUOUS PRN
Status: DISCONTINUED | OUTPATIENT
Start: 2023-01-01 | End: 2023-01-01

## 2023-01-01 RX ORDER — LORAZEPAM 2 MG/ML
1 CONCENTRATE ORAL
Status: DISCONTINUED | OUTPATIENT
Start: 2023-01-01 | End: 2023-01-01 | Stop reason: HOSPADM

## 2023-01-01 RX ORDER — LORAZEPAM 2 MG/ML
1 INJECTION INTRAMUSCULAR
Status: DISCONTINUED | OUTPATIENT
Start: 2023-01-01 | End: 2023-01-01 | Stop reason: HOSPADM

## 2023-01-01 RX ORDER — MAGNESIUM SULFATE HEPTAHYDRATE 40 MG/ML
2 INJECTION, SOLUTION INTRAVENOUS ONCE
Status: COMPLETED | OUTPATIENT
Start: 2023-01-01 | End: 2023-01-01

## 2023-01-01 RX ORDER — INSULIN LISPRO 100 [IU]/ML
1-200 INJECTION, SOLUTION INTRAVENOUS; SUBCUTANEOUS AS NEEDED
Status: DISCONTINUED | OUTPATIENT
Start: 2023-01-01 | End: 2023-01-01

## 2023-01-01 RX ORDER — SODIUM CHLORIDE 450 MG/100ML
250 INJECTION, SOLUTION INTRAVENOUS CONTINUOUS PRN
Status: DISCONTINUED | OUTPATIENT
Start: 2023-01-01 | End: 2023-01-01

## 2023-01-01 RX ORDER — DEXTROSE AND SODIUM CHLORIDE 5; .45 G/100ML; G/100ML
150 INJECTION, SOLUTION INTRAVENOUS CONTINUOUS PRN
Status: DISCONTINUED | OUTPATIENT
Start: 2023-01-01 | End: 2023-01-01

## 2023-01-01 RX ORDER — LORAZEPAM 2 MG/ML
0.5 INJECTION INTRAMUSCULAR
Status: DISCONTINUED | OUTPATIENT
Start: 2023-01-01 | End: 2023-01-01 | Stop reason: HOSPADM

## 2023-01-01 RX ORDER — HEPARIN SODIUM 10000 [USP'U]/ML
60 INJECTION, SOLUTION INTRAVENOUS; SUBCUTANEOUS ONCE
Status: SHIPPED | OUTPATIENT
Start: 2023-01-01

## 2023-01-01 RX ORDER — SUCRALFATE 1 G/1
1 TABLET ORAL
Status: DISCONTINUED | OUTPATIENT
Start: 2023-01-01 | End: 2023-01-01 | Stop reason: HOSPADM

## 2023-01-01 RX ORDER — BENZONATATE 100 MG/1
200 CAPSULE ORAL 3 TIMES DAILY PRN
Status: DISCONTINUED | OUTPATIENT
Start: 2023-01-01 | End: 2023-01-01 | Stop reason: HOSPADM

## 2023-01-01 RX ORDER — ACETAMINOPHEN 325 MG/1
650 TABLET ORAL EVERY 4 HOURS PRN
Status: DISCONTINUED | OUTPATIENT
Start: 2023-01-01 | End: 2023-01-01 | Stop reason: HOSPADM

## 2023-01-01 RX ORDER — DOPAMINE HYDROCHLORIDE 160 MG/100ML
INJECTION, SOLUTION INTRAVENOUS
Status: COMPLETED
Start: 2023-01-01 | End: 2023-01-01

## 2023-01-01 RX ORDER — ACETAMINOPHEN 650 MG/1
650 SUPPOSITORY RECTAL EVERY 4 HOURS PRN
Status: DISCONTINUED | OUTPATIENT
Start: 2023-01-01 | End: 2023-01-01 | Stop reason: HOSPADM

## 2023-01-01 RX ORDER — TRAMADOL HYDROCHLORIDE 50 MG/1
25 TABLET ORAL EVERY 12 HOURS PRN
Status: DISCONTINUED | OUTPATIENT
Start: 2023-01-01 | End: 2023-01-01 | Stop reason: HOSPADM

## 2023-01-01 RX ORDER — DEXTROSE, SODIUM CHLORIDE, AND POTASSIUM CHLORIDE 5; .45; .15 G/100ML; G/100ML; G/100ML
150 INJECTION INTRAVENOUS CONTINUOUS PRN
Status: DISCONTINUED | OUTPATIENT
Start: 2023-01-01 | End: 2023-01-01

## 2023-01-01 RX ORDER — DEXTROSE, SODIUM CHLORIDE, AND POTASSIUM CHLORIDE 5; .45; .3 G/100ML; G/100ML; G/100ML
150 INJECTION INTRAVENOUS CONTINUOUS PRN
Status: DISCONTINUED | OUTPATIENT
Start: 2023-01-01 | End: 2023-01-01

## 2023-01-01 RX ORDER — IPRATROPIUM BROMIDE AND ALBUTEROL SULFATE 2.5; .5 MG/3ML; MG/3ML
3 SOLUTION RESPIRATORY (INHALATION) 4 TIMES DAILY PRN
Status: DISCONTINUED | OUTPATIENT
Start: 2023-01-01 | End: 2023-01-01 | Stop reason: HOSPADM

## 2023-01-01 RX ORDER — MORPHINE SULFATE 2 MG/ML
1 INJECTION, SOLUTION INTRAMUSCULAR; INTRAVENOUS
Status: DISCONTINUED | OUTPATIENT
Start: 2023-01-01 | End: 2023-01-01 | Stop reason: HOSPADM

## 2023-01-01 RX ORDER — GABAPENTIN 300 MG/1
300 CAPSULE ORAL 3 TIMES DAILY
Status: DISCONTINUED | OUTPATIENT
Start: 2023-01-01 | End: 2023-01-01

## 2023-01-01 RX ORDER — LORAZEPAM 2 MG/ML
2 CONCENTRATE ORAL
Status: DISCONTINUED | OUTPATIENT
Start: 2023-01-01 | End: 2023-01-01 | Stop reason: HOSPADM

## 2023-01-01 RX ORDER — SODIUM CHLORIDE 0.9 % (FLUSH) 0.9 %
10 SYRINGE (ML) INJECTION EVERY 12 HOURS SCHEDULED
Status: DISCONTINUED | OUTPATIENT
Start: 2023-01-01 | End: 2023-01-01

## 2023-01-01 RX ORDER — POTASSIUM CHLORIDE, DEXTROSE MONOHYDRATE AND SODIUM CHLORIDE 300; 5; 900 MG/100ML; G/100ML; MG/100ML
150 INJECTION, SOLUTION INTRAVENOUS CONTINUOUS PRN
Status: DISCONTINUED | OUTPATIENT
Start: 2023-01-01 | End: 2023-01-01

## 2023-01-01 RX ORDER — DOPAMINE HYDROCHLORIDE 160 MG/100ML
2-20 INJECTION, SOLUTION INTRAVENOUS
Status: DISCONTINUED | OUTPATIENT
Start: 2023-01-01 | End: 2023-01-01 | Stop reason: HOSPADM

## 2023-01-01 RX ORDER — CLINDAMYCIN PHOSPHATE 20 MG/G
1 CREAM VAGINAL NIGHTLY
Status: DISCONTINUED | OUTPATIENT
Start: 2023-01-01 | End: 2023-01-01

## 2023-01-01 RX ORDER — ERGOCALCIFEROL 1.25 MG/1
50000 CAPSULE ORAL WEEKLY
COMMUNITY
Start: 2023-01-01 | End: 2024-02-09

## 2023-01-01 RX ORDER — DEXTROSE, SODIUM CHLORIDE, AND POTASSIUM CHLORIDE 5; .9; .15 G/100ML; G/100ML; G/100ML
150 INJECTION INTRAVENOUS CONTINUOUS PRN
Status: DISCONTINUED | OUTPATIENT
Start: 2023-01-01 | End: 2023-01-01

## 2023-01-01 RX ORDER — LORAZEPAM 0.5 MG/1
0.5 TABLET ORAL
Status: DISCONTINUED | OUTPATIENT
Start: 2023-01-01 | End: 2023-01-01 | Stop reason: HOSPADM

## 2023-01-01 RX ORDER — SODIUM CHLORIDE AND POTASSIUM CHLORIDE 150; 900 MG/100ML; MG/100ML
250 INJECTION, SOLUTION INTRAVENOUS CONTINUOUS PRN
Status: DISCONTINUED | OUTPATIENT
Start: 2023-01-01 | End: 2023-01-01

## 2023-01-01 RX ORDER — LORAZEPAM 2 MG/ML
2 INJECTION INTRAMUSCULAR
Status: DISCONTINUED | OUTPATIENT
Start: 2023-01-01 | End: 2023-01-01 | Stop reason: HOSPADM

## 2023-01-01 RX ORDER — SIMETHICONE 80 MG
80 TABLET,CHEWABLE ORAL 4 TIMES DAILY PRN
Status: DISCONTINUED | OUTPATIENT
Start: 2023-01-01 | End: 2023-01-01 | Stop reason: HOSPADM

## 2023-01-01 RX ORDER — ROPINIROLE 0.25 MG/1
0.25 TABLET, FILM COATED ORAL NIGHTLY
Status: DISCONTINUED | OUTPATIENT
Start: 2023-01-01 | End: 2023-01-01 | Stop reason: HOSPADM

## 2023-01-01 RX ORDER — LORAZEPAM 2 MG/1
2 TABLET ORAL
Status: DISCONTINUED | OUTPATIENT
Start: 2023-01-01 | End: 2023-01-01 | Stop reason: HOSPADM

## 2023-01-01 RX ORDER — ALBUTEROL SULFATE 2.5 MG/3ML
2.5 SOLUTION RESPIRATORY (INHALATION) EVERY 6 HOURS PRN
Status: DISCONTINUED | OUTPATIENT
Start: 2023-01-01 | End: 2023-01-01

## 2023-01-01 RX ORDER — INSULIN LISPRO 100 [IU]/ML
1-200 INJECTION, SOLUTION INTRAVENOUS; SUBCUTANEOUS
Status: DISCONTINUED | OUTPATIENT
Start: 2023-01-01 | End: 2023-01-01

## 2023-01-01 RX ORDER — NOREPINEPHRINE BIT/0.9 % NACL 8 MG/250ML
.02-.3 INFUSION BOTTLE (ML) INTRAVENOUS
Status: DISCONTINUED | OUTPATIENT
Start: 2023-01-01 | End: 2023-01-01 | Stop reason: HOSPADM

## 2023-01-01 RX ORDER — NYSTATIN 100000 U/G
1 CREAM TOPICAL 2 TIMES DAILY
Qty: 28 G | Refills: 0 | Status: SHIPPED | OUTPATIENT
Start: 2023-01-01 | End: 2023-01-01

## 2023-01-01 RX ORDER — LISINOPRIL 2.5 MG/1
2.5 TABLET ORAL DAILY
Status: DISCONTINUED | OUTPATIENT
Start: 2023-01-01 | End: 2023-01-01

## 2023-01-01 RX ORDER — LORAZEPAM 2 MG/ML
0.5 CONCENTRATE ORAL
Status: DISCONTINUED | OUTPATIENT
Start: 2023-01-01 | End: 2023-01-01 | Stop reason: HOSPADM

## 2023-01-01 RX ORDER — FUROSEMIDE 10 MG/ML
60 INJECTION INTRAMUSCULAR; INTRAVENOUS ONCE
Status: COMPLETED | OUTPATIENT
Start: 2023-01-01 | End: 2023-01-01

## 2023-01-01 RX ORDER — IPRATROPIUM BROMIDE 21 UG/1
2 SPRAY, METERED NASAL 3 TIMES DAILY
Status: DISCONTINUED | OUTPATIENT
Start: 2023-01-01 | End: 2023-01-01 | Stop reason: HOSPADM

## 2023-01-01 RX ORDER — ONDANSETRON 2 MG/ML
4 INJECTION INTRAMUSCULAR; INTRAVENOUS EVERY 4 HOURS PRN
Status: DISCONTINUED | OUTPATIENT
Start: 2023-01-01 | End: 2023-01-01

## 2023-01-01 RX ORDER — DEXTROSE AND SODIUM CHLORIDE 5; .45 G/100ML; G/100ML
100 INJECTION, SOLUTION INTRAVENOUS CONTINUOUS
Status: DISCONTINUED | OUTPATIENT
Start: 2023-01-01 | End: 2023-01-01

## 2023-01-01 RX ORDER — IPRATROPIUM BROMIDE AND ALBUTEROL SULFATE 2.5; .5 MG/3ML; MG/3ML
3 SOLUTION RESPIRATORY (INHALATION)
Status: DISCONTINUED | OUTPATIENT
Start: 2023-01-01 | End: 2023-01-01

## 2023-01-01 RX ORDER — SODIUM CHLORIDE AND POTASSIUM CHLORIDE 150; 450 MG/100ML; MG/100ML
250 INJECTION, SOLUTION INTRAVENOUS CONTINUOUS PRN
Status: DISCONTINUED | OUTPATIENT
Start: 2023-01-01 | End: 2023-01-01

## 2023-01-01 RX ORDER — SODIUM CHLORIDE AND POTASSIUM CHLORIDE 300; 900 MG/100ML; MG/100ML
250 INJECTION, SOLUTION INTRAVENOUS CONTINUOUS PRN
Status: DISCONTINUED | OUTPATIENT
Start: 2023-01-01 | End: 2023-01-01

## 2023-01-01 RX ORDER — SODIUM CHLORIDE 0.9 % (FLUSH) 0.9 %
10 SYRINGE (ML) INJECTION AS NEEDED
Status: DISCONTINUED | OUTPATIENT
Start: 2023-01-01 | End: 2023-01-01 | Stop reason: HOSPADM

## 2023-01-01 RX ORDER — CLOBETASOL PROPIONATE 0.5 MG/G
CREAM TOPICAL EVERY 12 HOURS SCHEDULED
Status: DISCONTINUED | OUTPATIENT
Start: 2023-01-01 | End: 2023-01-01

## 2023-01-01 RX ORDER — CEPHALEXIN 250 MG/1
250 CAPSULE ORAL NIGHTLY
Qty: 30 CAPSULE | Refills: 1 | Status: ON HOLD | OUTPATIENT
Start: 2023-01-01 | End: 2023-01-01

## 2023-01-01 RX ORDER — FUROSEMIDE 10 MG/ML
20 INJECTION INTRAMUSCULAR; INTRAVENOUS ONCE
Status: DISCONTINUED | OUTPATIENT
Start: 2023-01-01 | End: 2023-01-01

## 2023-01-01 RX ORDER — ACETAMINOPHEN 160 MG/5ML
650 SOLUTION ORAL EVERY 4 HOURS PRN
Status: DISCONTINUED | OUTPATIENT
Start: 2023-01-01 | End: 2023-01-01 | Stop reason: HOSPADM

## 2023-01-01 RX ORDER — DEXTROSE MONOHYDRATE 25 G/50ML
50 INJECTION, SOLUTION INTRAVENOUS ONCE
Status: COMPLETED | OUTPATIENT
Start: 2023-01-01 | End: 2023-01-01

## 2023-01-01 RX ORDER — TRAMADOL HYDROCHLORIDE 50 MG/1
50 TABLET ORAL 2 TIMES DAILY
Status: DISCONTINUED | OUTPATIENT
Start: 2023-01-01 | End: 2023-01-01

## 2023-01-01 RX ORDER — SODIUM CHLORIDE 9 MG/ML
40 INJECTION, SOLUTION INTRAVENOUS AS NEEDED
Status: DISCONTINUED | OUTPATIENT
Start: 2023-01-01 | End: 2023-01-01

## 2023-01-01 RX ORDER — CEPHALEXIN 250 MG/1
250 CAPSULE ORAL DAILY
COMMUNITY

## 2023-01-01 RX ORDER — DEXTROSE AND SODIUM CHLORIDE 5; .9 G/100ML; G/100ML
150 INJECTION, SOLUTION INTRAVENOUS CONTINUOUS PRN
Status: DISCONTINUED | OUTPATIENT
Start: 2023-01-01 | End: 2023-01-01

## 2023-01-01 RX ORDER — HEPARIN SODIUM 1000 [USP'U]/ML
10000 INJECTION, SOLUTION INTRAVENOUS; SUBCUTANEOUS ONCE
Status: COMPLETED | OUTPATIENT
Start: 2023-01-01 | End: 2023-01-01

## 2023-01-01 RX ORDER — ALUMINA, MAGNESIA, AND SIMETHICONE 2400; 2400; 240 MG/30ML; MG/30ML; MG/30ML
15 SUSPENSION ORAL EVERY 6 HOURS PRN
Status: DISCONTINUED | OUTPATIENT
Start: 2023-01-01 | End: 2023-01-01 | Stop reason: HOSPADM

## 2023-01-01 RX ORDER — CALCIUM GLUCONATE 20 MG/ML
1 INJECTION, SOLUTION INTRAVENOUS ONCE
Status: COMPLETED | OUTPATIENT
Start: 2023-01-01 | End: 2023-01-01

## 2023-01-01 RX ORDER — PANTOPRAZOLE SODIUM 40 MG/1
40 TABLET, DELAYED RELEASE ORAL
Status: DISCONTINUED | OUTPATIENT
Start: 2023-01-01 | End: 2023-01-01 | Stop reason: HOSPADM

## 2023-01-01 RX ORDER — ONDANSETRON 2 MG/ML
4 INJECTION INTRAMUSCULAR; INTRAVENOUS EVERY 6 HOURS PRN
Status: DISCONTINUED | OUTPATIENT
Start: 2023-01-01 | End: 2023-01-01 | Stop reason: HOSPADM

## 2023-01-01 RX ORDER — DIPHENOXYLATE HYDROCHLORIDE AND ATROPINE SULFATE 2.5; .025 MG/1; MG/1
1 TABLET ORAL
Status: DISCONTINUED | OUTPATIENT
Start: 2023-01-01 | End: 2023-01-01 | Stop reason: HOSPADM

## 2023-01-01 RX ORDER — CLINDAMYCIN PHOSPHATE 20 MG/G
CREAM VAGINAL
Qty: 40 G | Refills: 0 | Status: SHIPPED | OUTPATIENT
Start: 2023-01-01

## 2023-01-01 RX ORDER — IPRATROPIUM BROMIDE 21 UG/1
2 SPRAY, METERED NASAL 3 TIMES DAILY
Qty: 30 ML | Refills: 5 | Status: SHIPPED | OUTPATIENT
Start: 2023-01-01

## 2023-01-01 RX ORDER — POTASSIUM CHLORIDE 7.45 MG/ML
10 INJECTION INTRAVENOUS CONTINUOUS PRN
Status: DISCONTINUED | OUTPATIENT
Start: 2023-01-01 | End: 2023-01-01

## 2023-01-01 RX ORDER — LORAZEPAM 0.5 MG/1
0.5 TABLET ORAL 2 TIMES DAILY
Status: DISCONTINUED | OUTPATIENT
Start: 2023-01-01 | End: 2023-01-01 | Stop reason: HOSPADM

## 2023-01-01 RX ORDER — ALBUTEROL SULFATE 90 UG/1
2 AEROSOL, METERED RESPIRATORY (INHALATION) EVERY 4 HOURS PRN
Qty: 18 G | Refills: 5 | Status: SHIPPED | OUTPATIENT
Start: 2023-01-01

## 2023-01-01 RX ORDER — LORAZEPAM 0.5 MG/1
1 TABLET ORAL
Status: DISCONTINUED | OUTPATIENT
Start: 2023-01-01 | End: 2023-01-01 | Stop reason: HOSPADM

## 2023-01-01 RX ADMIN — ONDANSETRON 4 MG: 2 INJECTION INTRAMUSCULAR; INTRAVENOUS at 15:55

## 2023-01-01 RX ADMIN — TAZOBACTAM SODIUM AND PIPERACILLIN SODIUM 3.38 G: 375; 3 INJECTION, SOLUTION INTRAVENOUS at 23:21

## 2023-01-01 RX ADMIN — DEXTROSE MONOHYDRATE 13 ML: 25 INJECTION, SOLUTION INTRAVENOUS at 00:20

## 2023-01-01 RX ADMIN — PANTOPRAZOLE SODIUM 40 MG: 40 TABLET, DELAYED RELEASE ORAL at 07:15

## 2023-01-01 RX ADMIN — INSULIN HUMAN 9.8 UNITS/HR: 1 INJECTION, SOLUTION INTRAVENOUS at 08:36

## 2023-01-01 RX ADMIN — SODIUM CHLORIDE 1000 ML: 9 INJECTION, SOLUTION INTRAVENOUS at 17:57

## 2023-01-01 RX ADMIN — MORPHINE SULFATE 1 MG: 2 INJECTION, SOLUTION INTRAMUSCULAR; INTRAVENOUS at 17:17

## 2023-01-01 RX ADMIN — SODIUM BICARBONATE 50 MEQ: 84 INJECTION INTRAVENOUS at 20:51

## 2023-01-01 RX ADMIN — Medication 50 MEQ: at 10:20

## 2023-01-01 RX ADMIN — DOPAMINE HYDROCHLORIDE 2 MCG/KG/MIN: 160 INJECTION, SOLUTION INTRAVENOUS at 18:17

## 2023-01-01 RX ADMIN — INSULIN HUMAN 8.4 UNITS/HR: 1 INJECTION, SOLUTION INTRAVENOUS at 06:12

## 2023-01-01 RX ADMIN — DOPAMINE HYDROCHLORIDE 13 MCG/KG/MIN: 160 INJECTION, SOLUTION INTRAVENOUS at 01:48

## 2023-01-01 RX ADMIN — SODIUM CHLORIDE 1000 ML: 9 INJECTION, SOLUTION INTRAVENOUS at 05:55

## 2023-01-01 RX ADMIN — DOPAMINE HYDROCHLORIDE 12 MCG/KG/MIN: 160 INJECTION, SOLUTION INTRAVENOUS at 18:53

## 2023-01-01 RX ADMIN — SODIUM ZIRCONIUM CYCLOSILICATE 10 G: 10 POWDER, FOR SUSPENSION ORAL at 23:15

## 2023-01-01 RX ADMIN — SODIUM BICARBONATE 100 ML/HR: 84 INJECTION, SOLUTION INTRAVENOUS at 11:03

## 2023-01-01 RX ADMIN — DOPAMINE HYDROCHLORIDE 16 MCG/KG/MIN: 160 INJECTION, SOLUTION INTRAVENOUS at 12:40

## 2023-01-01 RX ADMIN — ALBUTEROL SULFATE 10 MG: 5 SOLUTION RESPIRATORY (INHALATION) at 20:42

## 2023-01-01 RX ADMIN — ONDANSETRON 4 MG: 2 INJECTION INTRAMUSCULAR; INTRAVENOUS at 19:43

## 2023-01-01 RX ADMIN — IPRATROPIUM BROMIDE AND ALBUTEROL SULFATE 3 ML: 2.5; .5 SOLUTION RESPIRATORY (INHALATION) at 07:12

## 2023-01-01 RX ADMIN — Medication 10 ML: at 18:10

## 2023-01-01 RX ADMIN — TAZOBACTAM SODIUM AND PIPERACILLIN SODIUM 3.38 G: 375; 3 INJECTION, SOLUTION INTRAVENOUS at 18:39

## 2023-01-01 RX ADMIN — VASOPRESSIN 0.04 UNITS/MIN: 0.2 INJECTION INTRAVENOUS at 20:24

## 2023-01-01 RX ADMIN — MAGNESIUM SULFATE HEPTAHYDRATE 2 G: 2 INJECTION, SOLUTION INTRAVENOUS at 09:30

## 2023-01-01 RX ADMIN — Medication 50 MEQ: at 10:10

## 2023-01-01 RX ADMIN — HEPARIN SODIUM 3900 UNITS: 10000 INJECTION, SOLUTION INTRAVENOUS; SUBCUTANEOUS at 10:20

## 2023-01-01 RX ADMIN — TAZOBACTAM SODIUM AND PIPERACILLIN SODIUM 3.38 G: 375; 3 INJECTION, SOLUTION INTRAVENOUS at 07:05

## 2023-01-01 RX ADMIN — MORPHINE SULFATE 1 MG: 2 INJECTION, SOLUTION INTRAMUSCULAR; INTRAVENOUS at 15:55

## 2023-01-01 RX ADMIN — DICLOFENAC 2 G: 10 GEL TOPICAL at 21:33

## 2023-01-01 RX ADMIN — DOPAMINE HYDROCHLORIDE 14 MCG/KG/MIN: 160 INJECTION, SOLUTION INTRAVENOUS at 05:03

## 2023-01-01 RX ADMIN — VASOPRESSIN 0.04 UNITS/MIN: 0.2 INJECTION INTRAVENOUS at 03:34

## 2023-01-01 RX ADMIN — VASOPRESSIN 0.03 UNITS/MIN: 0.2 INJECTION INTRAVENOUS at 14:44

## 2023-01-01 RX ADMIN — TRAMADOL HYDROCHLORIDE 50 MG: 50 TABLET, COATED ORAL at 21:30

## 2023-01-01 RX ADMIN — ATROPINE SULFATE 1 MG: 0.1 INJECTION INTRAVENOUS at 17:57

## 2023-01-01 RX ADMIN — Medication 50 MEQ: at 13:32

## 2023-01-01 RX ADMIN — ONDANSETRON 4 MG: 2 INJECTION INTRAMUSCULAR; INTRAVENOUS at 03:34

## 2023-01-01 RX ADMIN — DEXTROSE MONOHYDRATE 50 ML: 25 INJECTION, SOLUTION INTRAVENOUS at 21:05

## 2023-01-01 RX ADMIN — TRAMADOL HYDROCHLORIDE 25 MG: 50 TABLET, COATED ORAL at 01:10

## 2023-01-01 RX ADMIN — DICLOFENAC 2 G: 10 GEL TOPICAL at 16:03

## 2023-01-01 RX ADMIN — HEPARIN SODIUM 3900 UNITS: 10000 INJECTION, SOLUTION INTRAVENOUS; SUBCUTANEOUS at 14:45

## 2023-01-01 RX ADMIN — IPRATROPIUM BROMIDE 2 SPRAY: 21 SPRAY, METERED NASAL at 16:04

## 2023-01-01 RX ADMIN — DEXTROSE AND SODIUM CHLORIDE 100 ML/HR: 5; 450 INJECTION, SOLUTION INTRAVENOUS at 05:18

## 2023-01-01 RX ADMIN — HYDROMORPHONE HYDROCHLORIDE 0.5 MG: 1 INJECTION, SOLUTION INTRAMUSCULAR; INTRAVENOUS; SUBCUTANEOUS at 04:24

## 2023-01-01 RX ADMIN — DEXTROSE MONOHYDRATE 50 ML: 25 INJECTION, SOLUTION INTRAVENOUS at 20:29

## 2023-01-01 RX ADMIN — MAGNESIUM SULFATE HEPTAHYDRATE 2 G: 2 INJECTION, SOLUTION INTRAVENOUS at 17:32

## 2023-01-01 RX ADMIN — SODIUM CHLORIDE 1000 ML: 9 INJECTION, SOLUTION INTRAVENOUS at 18:41

## 2023-01-01 RX ADMIN — CALCIUM GLUCONATE 1 G: 20 INJECTION, SOLUTION INTRAVENOUS at 20:50

## 2023-01-01 RX ADMIN — INSULIN HUMAN 10 UNITS: 100 INJECTION, SOLUTION PARENTERAL at 21:08

## 2023-01-01 RX ADMIN — SODIUM CHLORIDE 250 ML/HR: 4.5 INJECTION, SOLUTION INTRAVENOUS at 09:44

## 2023-01-01 RX ADMIN — HEPARIN SODIUM 3900 UNITS: 10000 INJECTION, SOLUTION INTRAVENOUS; SUBCUTANEOUS at 10:10

## 2023-01-01 RX ADMIN — POTASSIUM CHLORIDE, DEXTROSE MONOHYDRATE AND SODIUM CHLORIDE 150 ML/HR: 150; 5; 900 INJECTION, SOLUTION INTRAVENOUS at 15:58

## 2023-01-01 RX ADMIN — INSULIN HUMAN 16.8 UNITS/HR: 1 INJECTION, SOLUTION INTRAVENOUS at 12:44

## 2023-01-01 RX ADMIN — Medication 10 ML: at 08:32

## 2023-01-01 RX ADMIN — VASOPRESSIN 0.04 UNITS/MIN: 0.2 INJECTION INTRAVENOUS at 11:19

## 2023-01-01 RX ADMIN — IPRATROPIUM BROMIDE 2 SPRAY: 21 SPRAY, METERED NASAL at 17:02

## 2023-01-01 RX ADMIN — HEPARIN SODIUM 3900 UNITS: 10000 INJECTION, SOLUTION INTRAVENOUS; SUBCUTANEOUS at 10:09

## 2023-01-01 RX ADMIN — IPRATROPIUM BROMIDE AND ALBUTEROL SULFATE 3 ML: 2.5; .5 SOLUTION RESPIRATORY (INHALATION) at 20:06

## 2023-01-01 RX ADMIN — CLOBETASOL PROPIONATE: 0.5 CREAM TOPICAL at 15:33

## 2023-01-01 RX ADMIN — DICLOFENAC 2 G: 10 GEL TOPICAL at 08:30

## 2023-01-01 RX ADMIN — LORAZEPAM 0.5 MG: 0.5 TABLET ORAL at 22:49

## 2023-01-01 RX ADMIN — Medication 10 ML: at 17:54

## 2023-01-01 RX ADMIN — TAZOBACTAM SODIUM AND PIPERACILLIN SODIUM 3.38 G: 375; 3 INJECTION, SOLUTION INTRAVENOUS at 08:26

## 2023-01-01 RX ADMIN — FUROSEMIDE 60 MG: 10 INJECTION, SOLUTION INTRAMUSCULAR; INTRAVENOUS at 19:10

## 2023-01-01 RX ADMIN — DOPAMINE HYDROCHLORIDE 6 MCG/KG/MIN: 160 INJECTION, SOLUTION INTRAVENOUS at 11:19

## 2023-01-01 RX ADMIN — HEPARIN SODIUM 10000 UNITS: 1000 INJECTION, SOLUTION INTRAVENOUS; SUBCUTANEOUS at 15:38

## 2023-01-16 NOTE — PROGRESS NOTES
Procedure   Bladder antibiotic treatment    Date/Time: 1/16/2023 1:20 PM  Performed by: Shamika Pandey APRN  Authorized by: Shamika Pandey APRN   Preparation: Patient was prepped and draped in the usual sterile fashion.  Local anesthesia used: yes    Anesthesia:  Local anesthesia used: yes  Anesthetic total (ml): 6ml viscous lidocaine 2% to urethral meatus.    Sedation:  Patient sedated: no    Patient tolerance: patient tolerated the procedure well with no immediate complications         DX: Recurrent uti, painful voiding      Urethral catheterization was performed without difficulty.  12 Chilean red rubber catheter was used.  Heparin 20,000 units, Marcaine 0.5% 10 mL, Solu-Cortef 100 mg, sodium bicarbonate 8.4% 15 mL, gentamicin 80 mg, viscous lidocaine 2% 12 cc was mixed and instilled into the urinary bladder.  35 mL of sodium bicarbonate was discarded.  Patient is to leave the solution remaining in bladder for a minimum of 1 hour or until next void.

## 2023-02-01 NOTE — PROGRESS NOTES
Please notify patient their urine culture is normal. Start daily keflex at night with some food in stomach.  Thank you.

## 2023-02-06 NOTE — PROGRESS NOTES
Procedure      DX: Recurrent uti and Painful voiding      Antibiotic Bladder Cocktail Treatment    Date/Time: 2/6/2023 1:47 PM  Performed by: Shamika Pandey APRN  Authorized by: Shamika Pandey APRN   Preparation: Patient was prepped and draped in the usual sterile fashion.  Local anesthesia used: yes    Anesthesia:  Local anesthesia used: yes  Local Anesthetic: topical anesthetic (lidocaine jelly 2%)  Anesthetic total: 6 mL    Sedation:  Patient sedated: no    Patient tolerance: patient tolerated the procedure well with no immediate complications  Comments:   Urethral catheterization was performed without difficulty.  12 Kuwaiti red rubber catheter was used.  Heparin 10,000 units, Marcaine 0.5% 10 mL, Solu-Cortef 100 mg, sodium bicarbonate 8.4% 15 mL, gentamicin 80 mg, viscous lidocaine 2% 12 cc was mixed and instilled into the urinary bladder.  35 mL of sodium bicarbonate was discarded.  Patient is to leave the solution remaining in bladder for a minimum of 1 hour or until next void         Taking keflex 250 mg 1 capsule night with food for uti prophylaxis. Urine is clear today and patient feels medication is working,. Will change bladder treatments to every 2 weeks.

## 2023-02-08 NOTE — PROGRESS NOTES
Primary Care Provider  Doni Antunez MD   Referring Provider  No ref. provider found    Patient Complaint  Follow-up, COPD, Asthma, oxygen, and hilar mass      SUBJECTIVE    History of Presenting Illness  Laury Palacios is a pleasant 89 y.o. female who presents to [facility] for follow-up appointment.  I last saw the patient 10/10/2022.  Patient has been diagnosed in the past with hilar mass, adenocarcinoma. Patient has stated that she does not want any chemo/radiation for treatment. Patient states that she saw Dr. Antunez.She continues to use albuterol inhaler, atrovent, and duo nebs.  At present time patient is not on her O2 she does use it at 2 L mostly at night.  She does have shortness of air with exertional activities but with rest she recovers easily.  At present time patient states she is not having any coughing, wheezing, headaches, chest pain, weight loss or hemoptysis. Denies fevers, chills and night sweats. Laury Palacios is able to perform ADLs without difficulties and denies any swollen glands/lymph nodes in the head or neck.  Patient is here with her daughter today.  Patient doing well overall.  She continues to be on albuterol inhaler which she needs a refill on today.  In addition patient is wanting a refill on her Atrovent nasal spray.  Patient states she is planning a family cruise to [place] this May.    I have personally reviewed the review of systems, past family, social, medical and surgical histories; and agree with their findings.    Review of Systems   Constitutional: Negative.    HENT: Negative.    Eyes: Negative.    Respiratory: Positive for shortness of breath.    Cardiovascular: Negative.    Musculoskeletal: Negative.    Skin: Negative.         Family History   Problem Relation Age of Onset   • No Known Problems Mother    • No Known Problems Father    • Breast cancer Sister         30s   • Ovarian cancer Sister    • Melanoma Sister     • Malig Hyperthermia Neg Hx         Social History     Socioeconomic History   • Marital status:    Tobacco Use   • Smoking status: Former     Packs/day: 0.50     Years: 20.00     Pack years: 10.00     Types: Cigarettes     Quit date: 1992     Years since quittin.1   • Smokeless tobacco: Never   • Tobacco comments:     social smoker   Vaping Use   • Vaping Use: Never used   Substance and Sexual Activity   • Alcohol use: Never   • Drug use: No   • Sexual activity: Defer        Past Medical History:   Diagnosis Date   • Acid reflux    • Anemia    • Arthritis    • Asthma    • Bladder disorder    • Chronic renal failure    • Condition not found     Ulcer   • DDD (degenerative disc disease), lumbar    • Diabetes mellitus (HCC)     TYPE TWO   • Dysuria    • Ectropion of left eye    • Gastric ulcer    • Hemorrhoids 2018    grade II   • High blood pressure    • History of transfusion    • Hyperlipidemia    • Hypertension    • Limb pain    • Limb swelling    • Long-term current use of insulin for diabetes mellitus (HCC)    • Melanoma (HCC)     BACK   • Neck fracture (HCC) 1986    C4C5 NO SURGERY   • Peripheral neuropathy    • Rectal bleeding    • Spinal stenosis    • Squamous cell carcinoma     FACE REMOVED   • Stroke (HCC)    • Stuttering    • TIA (transient ischemic attack)         Immunization History   Administered Date(s) Administered   • COVID-19 (MODERNA) 1st, 2nd, 3rd Dose Only 2021, 2021, 2021, 2021, 2021, 2021   • COVID-19 (PFIZER) PURPLE CAP 2021, 2021, 2021, 2021, 2021, 2021, 2021   • Fluzone Quad >6mos (Multi-dose) 2014, 12/15/2020, 10/12/2021   • Influenza Quad Vaccine (Inpatient) 2014, 12/15/2020       Allergies   Allergen Reactions   • Ciprofloxacin Rash          Current Outpatient Medications:   •  albuterol sulfate  (90 Base) MCG/ACT inhaler, Inhale 2 puffs Every 4 (Four) Hours As Needed for  Wheezing., Disp: 18 g, Rfl: 5  •  amLODIPine (NORVASC) 2.5 MG tablet, Take 2.5 mg by mouth Daily., Disp: , Rfl:   •  bacitracin 500 UNIT/GM ointment, 1 APPLICATION EXTERNALLY ONCE A DAY 7 DAY(S), Disp: , Rfl:   •  BD Pen Needle Joi 2nd Gen 32G X 4 MM misc, , Disp: , Rfl:   •  benzonatate (TESSALON) 200 MG capsule, Take 1 capsule by mouth 3 (Three) Times a Day As Needed for Cough., Disp: 42 capsule, Rfl: 1  •  calcium-vitamin D (OSCAL-500) 500-200 MG-UNIT per tablet, Take 1 tablet by mouth 2 (Two) Times a Day., Disp: , Rfl:   •  cephalexin (Keflex) 250 MG capsule, Take 1 capsule by mouth Every Night., Disp: 30 capsule, Rfl: 1  •  clindamycin (CLEOCIN) 2 % vaginal cream, May use large pea size vaginally every night as needed for vaginal infection, Disp: 40 g, Rfl: 0  •  clobetasol (TEMOVATE) 0.05 % external solution, APPLY 10-15 DROPS TO THE AFFECTED AREAS IN THE SCALP ONCE DAILY UNTIL CLEAR, Disp: , Rfl:   •  Contour Next Test test strip, USE AS DIRECTED IN VITRO 3 TIMES A DAY 90 DAYS, Disp: , Rfl:   •  dexamethasone (DECADRON) 6 MG tablet, TAKE 1 TABLET BY MOUTH ONCE DAILY FOR 7 DAYS, Disp: , Rfl:   •  Diclofenac Sodium (VOLTAREN) 1 % gel gel, , Disp: , Rfl:   •  ergocalciferol (ERGOCALCIFEROL) 1.25 MG (70918 UT) capsule, Take 50,000 Units by mouth., Disp: , Rfl:   •  folic acid (FOLVITE) 1 MG tablet, Take 1 mg by mouth Daily., Disp: , Rfl:   •  furosemide (LASIX) 40 MG tablet, Take 40 mg by mouth Daily As Needed (FOR SWELLING)., Disp: , Rfl:   •  gabapentin (NEURONTIN) 300 MG capsule, Take 300 mg by mouth 3 (Three) Times a Day., Disp: , Rfl:   •  hydrocortisone 2.5 % cream, APPLY TO AFFECTED AREA 4 TIMES A DAY, Disp: , Rfl:   •  insulin aspart (novoLOG FLEXPEN) 100 UNIT/ML solution pen-injector sc pen, Inject 10 Units under the skin into the appropriate area as directed 3 (Three) Times a Day With Meals. BREAKFAST AND DINNER, Disp: , Rfl:   •  insulin detemir (LEVEMIR) 100 UNIT/ML injection, Inject 40 Units under  the skin into the appropriate area as directed 2 (Two) Times a Day. BREAKFAST AND DINNER, Disp: , Rfl:   •  ipratropium (ATROVENT) 0.03 % nasal spray, 2 sprays into the nostril(s) as directed by provider 3 (Three) Times a Day., Disp: 30 mL, Rfl: 5  •  ipratropium-albuterol (DUO-NEB) 0.5-2.5 mg/3 ml nebulizer, Take 3 mL by nebulization 4 (Four) Times a Day As Needed for Wheezing., Disp: 360 mL, Rfl: 5  •  ketoconazole (NIZORAL) 2 % shampoo, MASSAGE INTO SCALP 15-20 MINUTES BEFORE SHOWER, LET SIT, THEN RINSE., Disp: , Rfl:   •  lisinopril (PRINIVIL,ZESTRIL) 10 MG tablet, Take 10 mg by mouth Daily., Disp: , Rfl:   •  LORazepam (ATIVAN) 0.5 MG tablet, Take 0.5 mg by mouth 2 (Two) Times a Day., Disp: , Rfl:   •  metoprolol succinate XL (TOPROL-XL) 50 MG 24 hr tablet, Take 50 mg by mouth 2 (Two) Times a Day., Disp: , Rfl:   •  metoprolol-hydrochlorothiazide (LOPRESSOR HCT) 50-25 MG per tablet, Take 1 tablet by mouth 2 (Two) Times a Day., Disp: , Rfl:   •  metroNIDAZOLE (METROGEL) 0.75 % gel, , Disp: , Rfl:   •  multivitamin with minerals tablet tablet, Take 1 tablet by mouth Daily., Disp: , Rfl:   •  mupirocin (BACTROBAN) 2 % ointment, , Disp: , Rfl:   •  omeprazole (priLOSEC) 20 MG capsule, Take 20 mg by mouth Daily., Disp: , Rfl:   •  predniSONE (DELTASONE) 10 MG tablet, Take 1 PO for 7 days, Disp: 30 tablet, Rfl: 0  •  rOPINIRole (REQUIP) 0.25 MG tablet, Take 0.25 mg by mouth Every Night. Take 1 hour before bedtime., Disp: , Rfl:   •  rosuvastatin (CRESTOR) 20 MG tablet, Take 20 mg by mouth Daily., Disp: , Rfl:   •  sucralfate (CARAFATE) 1 g tablet, TAKE 1 TABLET BY MOUTH TWICE A DAY TAKE ON AN EMPTY STOMACH, Disp: , Rfl:   •  theophylline (UNIPHYL) 400 MG 24 hr tablet, Take 400 mg by mouth Daily., Disp: , Rfl:   •  traMADol (ULTRAM) 50 MG tablet, Take 50 mg by mouth 2 (Two) Times a Day., Disp: , Rfl:   •  lisinopril (PRINIVIL,ZESTRIL) 2.5 MG tablet, Take 2.5 mg by mouth Daily., Disp: , Rfl:     Current  Facility-Administered Medications:   •  bupivacaine (MARCAINE) 0.5 % 10 mL, heparin (porcine) 10,000 Units, Hydrocortisone Sod Suc (PF) (Solu-CORTEF) 100 mg, sodium bicarbonate 4.2 % 7.5 mEq, Lidocaine Viscous HCl (XYLOCAINE) 2 % 12 mL, gentamicin (GARAMYCIN) 80 mg irrigation, , Intracatheter, Once, Shamika Pandey, APRN  •  bupivacaine (MARCAINE) 0.5 % 10 mL, heparin (porcine) 10,000 Units, Hydrocortisone Sod Suc (PF) (Solu-CORTEF) 100 mg, sodium bicarbonate 4.2 % 7.5 mEq, Lidocaine Viscous HCl (XYLOCAINE) 2 % 12 mL, gentamicin (GARAMYCIN) 80 mg irrigation, , Intracatheter, Once, Shamika Pandey, APRN  •  bupivacaine (MARCAINE) 0.5 % 10 mL, heparin (porcine) 20,000 Units, hydrocortisone sodium succinate (Solu-CORTEF) 100 mg, sodium bicarbonate 4.2 % 7.5 mEq, Lidocaine Viscous HCl (XYLOCAINE) 2 % 12 mL, gentamicin (GARAMYCIN) 80 mg irrigation, , Intracatheter, Once, Shamika Pandey, APRN  •  bupivacaine (MARCAINE) 0.5 % 10 mL, heparin (porcine) 5,000 Units, hydrocortisone sodium succinate (Solu-CORTEF) 100 mg, sodium bicarbonate 4.2 % 7.5 mEq, Lidocaine Viscous HCl (XYLOCAINE) 2 % 12 mL, gentamicin (GARAMYCIN) 80 mg irrigation, , Intracatheter, Once, Shamika Pandey, APRN  •  gentamicin (GARAMYCIN) injection 80 mg, 80 mg, Intravesical, Once, Shamika Pandey, APRN  •  gentamicin (GARAMYCIN) injection 80 mg, 80 mg, Intravesical, Once, Shamika Pandey, APRN  •  gentamicin (GARAMYCIN) injection 80 mg, 80 mg, Intramuscular, Once, Shamika Pandey, APRN  •  gentamicin (GARAMYCIN) injection 80 mg, 80 mg, Intravesical, Once, Shamika Pandey, APRN  •  heparin (porcine) 20,000 Units, hydrocortisone sodium succinate (Solu-CORTEF) 100 mg, sodium bicarbonate 8.4 % 15 mL, Lidocaine Viscous HCl (XYLOCAINE) 2 % 12 mL irrigation, , Intracatheter, Once, Shamika Pandey, APRN  •  heparin (porcine) 20,000 Units, hydrocortisone sodium succinate (Solu-CORTEF) 100 mg, sodium bicarbonate 8.4 % 15 mL, Lidocaine  "Viscous HCl (XYLOCAINE) 2 % 12 mL, gentamicin (GARAMYCIN) 80 mg irrigation, , Intracatheter, Once, Shamika Pandey APRN  •  heparin (porcine) injection 10,000 Units, 10,000 Units, Intracatheter, Once, Shamika Pandey APRN  •  heparin (porcine) injection 10,000 Units, 10,000 Units, Intravenous, Once, Rosi Flood MD  •  heparin (porcine) injection 10,000 Units, 10,000 Units, Intravenous, Once, Shamika Pandey APRN  •  hydrocortisone sodium succinate (Solu-CORTEF) injection 100 mg, 100 mg, Intravenous, Q6H, Shamika Pandey APRN  •  hydrocortisone sodium succinate (Solu-CORTEF) injection 100 mg, 100 mg, Intravenous, Q6H, Rosi Flood MD  •  Lidocaine Viscous HCl (XYLOCAINE) 2 % solution 6 mL, 6 mL, Mouth/Throat, Once, Shamika Pandey APRN  •  Lidocaine Viscous HCl (XYLOCAINE) 2 % solution 6 mL, 6 mL, Mouth/Throat, Once, Shamika Pandey APRN  •  sodium bicarbonate injection 8.4% 50 mEq, 50 mEq, Injection, Once, Shamika Pandey APRN  •  sodium bicarbonate injection 8.4% 50 mEq, 50 mEq, Intravenous, Once, Shamika Pandey APRN  •  sodium bicarbonate injection 8.4% 50 mEq, 50 mEq, Intravenous, Once, Shamika Pandey APRN       OBJECTIVE    Vital Signs   /43 (BP Location: Left arm, Patient Position: Sitting, Cuff Size: Adult)   Pulse 57   Temp 97.8 °F (36.6 °C) (Tympanic)   Resp 18   Ht 161.3 cm (63.5\")   Wt 64.4 kg (142 lb)   SpO2 94% Comment: ROOM AIR  BMI 24.76 kg/m²     Physical Exam  Vitals reviewed.   Constitutional:       Appearance: Normal appearance.   HENT:      Head: Normocephalic and atraumatic.      Nose: Nose normal.      Mouth/Throat:      Mouth: Mucous membranes are moist.      Pharynx: Oropharynx is clear.   Eyes:      Extraocular Movements: Extraocular movements intact.      Conjunctiva/sclera: Conjunctivae normal.      Pupils: Pupils are equal, round, and reactive to light.   Cardiovascular:      Rate and Rhythm: Normal rate and regular " rhythm.      Pulses: Normal pulses.      Heart sounds: Normal heart sounds.   Pulmonary:      Effort: Pulmonary effort is normal.      Breath sounds: Normal breath sounds.   Abdominal:      General: Bowel sounds are normal.   Musculoskeletal:         General: Normal range of motion.      Cervical back: Normal range of motion and neck supple.   Skin:     General: Skin is warm and dry.   Neurological:      Mental Status: She is alert and oriented to person, place, and time.   Psychiatric:         Behavior: Behavior normal.          Results Review  I have personally reviewed the prior office notes, diagnostics.      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[facility])   • Hemorrhoids   • Generalized anxiety disorder   • Gastro-esophageal reflux disease with esophagitis   • Edema   • Post-menopausal bleeding   • Neuropathy   • Mixed hyperlipidemia   • Melanoma ([facility])   • Long term current use of insulin ([facility])   • Limb swelling   • Hypothyroid   • Hypertension   • Vitamin D deficiency   • Ulcer disease   • Type 2 diabetes mellitus with hyperglycemia ([facility])   • Stuttering   • Stroke ([facility])   • Chronic kidney disease   • Restless legs syndrome   • Rectal bleeding   • S/P carotid endarterectomy   • Cellulitis and abscess of foot   • Cellulitis   • Hilar mass   • Hyperkalemia   • Neuralgia   • Proteinuria   • Retinal disorder   • Chronic UTI   • Other chronic pain   • Post-void dribbling   • Post-menopausal atrophic vaginitis   • Cystitis without hematuria   • Pain with urination   • Recurrent UTI   • Cellulitis of left thigh   • Insect bite of left thigh   • DDD (degenerative disc disease), lumbar   • Spinal stenosis   • Adenocarcinoma, lung, right (HCC)   • Candidal skin infection   • Absence of bladder continence   • AV (anaerobic vaginosis)   • Urethral spasm        Diagnoses and all orders for this visit:    1. Chronic obstructive pulmonary disease, unspecified COPD type (HCC) (Primary)  -     albuterol sulfate  (90 Base) MCG/ACT inhaler; Inhale 2 puffs Every 4 (Four) Hours As Needed for Wheezing.  Dispense: 18 g; Refill: 5  -     ipratropium (ATROVENT) 0.03 % nasal spray; 2 sprays into the nostril(s) as directed by provider 3 (Three) Times a Day.  Dispense: 30 mL; Refill: 5    2. Dyspnea on exertion  -     albuterol sulfate  (90 Base) MCG/ACT inhaler; Inhale 2 puffs Every 4 (Four) Hours As Needed for Wheezing.  Dispense: 18 g; Refill: 5  -     ipratropium (ATROVENT) 0.03 % nasal spray; 2 sprays into the nostril(s) as directed by provider 3 (Three) Times a Day.  Dispense: 30 mL; Refill: 5    3. Adenocarcinoma of right lung (HCC)  -     ipratropium (ATROVENT) 0.03 % nasal spray; 2 sprays into the nostril(s) as directed by provider 3 (Three) Times a Day.  Dispense: 30 mL; Refill: 5           Plan:  Patient to continue with current nebulizer and inhalers. Patient to followup in 4 months.  Dr. Mejia to come in to see the patient for a few minutes.  Patient to return to office sooner if needed.    Smoking status:former  Vaccination status:up to date  Medications personally reviewed    Follow Up  Return in about 4 months (around 6/9/2023).    Patient was given instructions and counseling regarding her condition or for health maintenance advice. Please see specific information pulled into the AVS if appropriate.

## 2023-02-24 NOTE — PROGRESS NOTES
Procedure      Dx: Recurrent and chronic uti, Painful voiding      Antibiotic Solution Bladder Treatment    Date/Time: 2/24/2023 2:30 AM  Performed by: Shamika Pandey APRN  Authorized by: Shamika Pandey APRN   Preparation: Patient was prepped and draped in the usual sterile fashion.  Local anesthesia used: yes    Anesthesia:  Local anesthesia used: yes  Local Anesthetic: topical anesthetic (lidocaine jelly 2%)  Anesthetic total: 6 mL    Sedation:  Patient sedated: no    Patient tolerance: patient tolerated the procedure well with no immediate complications  Comments:   Urethral catheterization was performed without difficulty.  14 Azeri catheter was used.  Heparin 10,000 units, Marcaine 0.5% 10 mL, Solu-Cortef 100 mg, sodium bicarbonate 8.4% 15 mL, gentamicin 80 mg, viscous lidocaine 2% 12 cc was mixed and instilled into the urinary bladder.  35 mL of sodium bicarbonate was discarded.  Patient is to leave the solution remaining in bladder for a minimum of 1 hour or until next void

## 2023-03-06 NOTE — PROGRESS NOTES
Procedure    DX Recurrent uti, painful voiding      Irrigation of Bladder    Date/Time: 3/6/2023 1:39 PM  Performed by: Shamika Pandey APRN  Authorized by: Shamika Pandey APRN   Preparation: Patient was prepped and draped in the usual sterile fashion.  Local anesthesia used: yes    Anesthesia:  Local anesthesia used: yes  Local Anesthetic: topical anesthetic (lidocaine jelly 2%)  Anesthetic total: 6 mL    Sedation:  Patient sedated: no    Patient tolerance: patient tolerated the procedure well with no immediate complications  Comments:   Urethral catheterization was performed without difficulty.  14 [place]  catheter was used.  Heparin 10,000 units, Marcaine 0.5% 10 mL, Solu-Cortef 100 mg, sodium bicarbonate 8.4% 15 mL, gentamicin 80 mg, viscous lidocaine 2% 12 cc was mixed and instilled into the urinary bladder.  35 mL of sodium bicarbonate was discarded.  Patient is to leave the solution remaining in bladder for a minimum of 1 hour or until next void

## 2023-03-13 NOTE — PROGRESS NOTES
Procedure      DX  Recurrent uti   Irrigation of Bladder    Date/Time: 3/13/2023 1:30 PM  Performed by: Shamika Pandey APRN  Authorized by: Shamika Pandey APRN   Preparation: Patient was prepped and draped in the usual sterile fashion.  Local anesthesia used: viscous lidocaine 2%    Anesthesia:  Local anesthesia used: viscous lidocaine 2%    Sedation:  Patient sedated: no    Patient tolerance: patient tolerated the procedure well with no immediate complications           Urethral catheterization was performed without difficulty.  14 Portuguese  catheter was used. 15 cc of thick purulent urine return Heparin 10,000 units, Marcaine 0.5% 10 mL, Solu-Cortef 100 mg, sodium bicarbonate 8.4% 15 mL, gentamicin 80 mg, viscous lidocaine 2% 12 cc was mixed and instilled into the urinary bladder.  35 mL of sodium bicarbonate was discarded.  Patient is to leave the solution remaining in bladder for a minimum of 1 hour or until next void. ST cath urine to be sent for culture.       Urine void sample last visit indicated enterobacter cloacae complex resistant to keflex. Susceptible to gentamicin. Patient continues keflex 250mg q evening which is been effective in preventing recurrent ecoli uti.

## 2023-03-22 NOTE — ED PROVIDER NOTES
Time: 5:59 PM EDT  Date of encounter:  3/22/2023  Independent Historian/Clinical History and Information was obtained by:   Patient and Family  Chief Complaint: Abdominal Pain, Black Stool    History is limited by: N/A    History of Present Illness:  Patient is a 89 y.o. year old female who presents to the emergency department for evaluation of abdominal pain and black stool since 3:30 PM.     Patient presents with her family from home. Patient reports abdominal pain since yesterday. Patient had black tarry stool and 3:30 PM today. Patient's family also notes black diarrhea. Family noticed that her heart rate and blood pressure were low at home so they brought her to the ED. Patient's heart rate and blood pressure have never been this low before. Patient is no longer on anticoagulants. Patient was not sick prior to this. Hx of ulcers and neuropathy. Patient is a diabetic. Patient sees Dr. Antunez for PCP. Patient has lung cancer but it is very small and she sees Dr. Antunez for it. Denies vomiting.         Patient Care Team  Primary Care Provider: Doni Antunez MD    Past Medical History:     Allergies   Allergen Reactions   • Ciprofloxacin Rash     Past Medical History:   Diagnosis Date   • Acid reflux    • Anemia    • Arthritis    • Asthma    • Bladder disorder    • Chronic renal failure    • Condition not found     Ulcer   • DDD (degenerative disc disease), lumbar    • Diabetes mellitus (HCC)     TYPE TWO   • Dysuria    • Ectropion of left eye    • Gastric ulcer    • Hemorrhoids 2018    grade II   • High blood pressure    • History of transfusion    • Hyperlipidemia    • Hypertension    • Limb pain    • Limb swelling    • Long-term current use of insulin for diabetes mellitus (HCC)    • Melanoma (HCC)     BACK   • Neck fracture (HCC) 1986    C4C5 NO SURGERY   • Peripheral neuropathy    • Rectal bleeding    • Spinal stenosis    • Squamous cell carcinoma     FACE REMOVED   • Stroke (HCC)    • Stuttering    •  TIA (transient ischemic attack)      Past Surgical History:   Procedure Laterality Date   • APPENDECTOMY     • BRONCHOSCOPY N/A 12/23/2021    Procedure: BRONCHOSCOPY WITH ENDOBRONCHIAL ULTRASOUND, RIGHT LOWER LOBE WASHINGS, BIOPSIES, BAL;  Surgeon: Glynn Mejia DO;  Location:  [facility];  Service: Pulmonary;  Laterality: N/A;  RIGHT LOWER LOBE ENDOBRONCHIAL MASS   • CAROTID ENDARTERECTOMY      BILAT, 6 YEARS APART IN 1990S   • CATARACT EXTRACTION     • CHOLECYSTECTOMY     • COLONOSCOPY  01/15/2019    [facility]   • CYSTOSCOPY     • ECTROPION REPAIR Left 06/27/2017    Procedure: LEFT LOWER LID CICATRICIAL ECTROPION  FULL THICKNESS SKIN GRAFT WITH FROST SUTURE;  Surgeon: Heath Patterson MD;  Location:  [facility];  Service:    • ECTROPION REPAIR Right 09/12/2017    Procedure: RT LOWER LID LESION EXCISION W/ ROTATIONAL FLAP, RT LOWER CHEEK EXCISION W/ ROTATIONAL FLAP, LT LOWER LID CICATRICAL ECTROPIAN REPAIR, FULL THICKNESS SKIN  FROST SUTURE;  Surgeon: Heath Patterson MD;  [place]  [facility]  Service:    • ENDOSCOPY  01/15/2019    [facility]   • TONSILLECTOMY     • UPPER GASTROINTESTINAL ENDOSCOPY       Family History   Problem Relation Age of Onset   • No Known Problems Mother    • No Known Problems Father    • Breast cancer Sister         30s   • Ovarian cancer Sister    • Melanoma Sister    • Malig Hyperthermia Neg Hx        Home Medications:  Prior to Admission medications    Medication Sig Start Date End Date Taking? Authorizing Provider   albuterol sulfate  (90 Base) MCG/ACT inhaler Inhale 2 puffs Every 4 (Four) Hours As Needed for Wheezing. 2/9/23   Dolores Torres APRN   amLODIPine (NORVASC) 2.5 MG tablet Take 2.5 mg by mouth Daily.    Provider, MD Angel   bacitracin 500 UNIT/GM ointment 1 APPLICATION EXTERNALLY ONCE A DAY 7 DAY(S) 6/14/22   Provider, MD Angel   BD Pen Needle Joi 2nd Gen 32G X 4 MM misc  4/26/22   ProviderAngel MD   benzonatate  (TESSALON) 200 MG capsule Take 1 capsule by mouth 3 (Three) Times a Day As Needed for Cough. 10/10/22   Dolores Torres APRN   calcium-vitamin D (OSCAL-500) 500-200 MG-UNIT per tablet Take 1 tablet by mouth 2 (Two) Times a Day. 4/16/21   Angel Lino MD   cephalexin (Keflex) 250 MG capsule Take 1 capsule by mouth Every Night. 1/30/23   Shamika Pandey APRN   clindamycin (CLEOCIN) 2 % vaginal cream May use large pea size vaginally every night as needed for vaginal infection 1/23/23   Shamika Pandey APRN   clobetasol (TEMOVATE) 0.05 % external solution APPLY 10-15 DROPS TO THE AFFECTED AREAS IN THE SCALP ONCE DAILY UNTIL CLEAR 6/22/22   Angel Lino MD   Contour Next Test test strip USE AS DIRECTED IN VITRO 3 TIMES A DAY 90 DAYS 3/1/22   Angel Lino MD   dexamethasone (DECADRON) 6 MG tablet TAKE 1 TABLET BY MOUTH ONCE DAILY FOR 7 DAYS 6/24/22   Angel Lino MD   Diclofenac Sodium (VOLTAREN) 1 % gel gel  5/9/22   Angel Lino MD   ergocalciferol (ERGOCALCIFEROL) 1.25 MG (36152 UT) capsule Take 50,000 Units by mouth. 2/9/23 2/9/24  Angel Lino MD   folic acid (FOLVITE) 1 MG tablet Take 1 mg by mouth Daily.    Angel Lino MD   furosemide (LASIX) 40 MG tablet Take 40 mg by mouth Daily As Needed (FOR SWELLING).    Angel Lino MD   gabapentin (NEURONTIN) 300 MG capsule Take 300 mg by mouth 3 (Three) Times a Day.    Angel Lino MD   hydrocortisone 2.5 % cream APPLY TO AFFECTED AREA 4 TIMES A DAY 6/14/22   Angel Lino MD   insulin aspart (novoLOG FLEXPEN) 100 UNIT/ML solution pen-injector sc pen Inject 10 Units under the skin into the appropriate area as directed 3 (Three) Times a Day With Meals. BREAKFAST AND DINNER    Angel Lino MD   insulin detemir (LEVEMIR) 100 UNIT/ML injection Inject 40 Units under the skin into the appropriate area as directed 2 (Two) Times a Day. BREAKFAST AND DINNER    [place]  MD Angel   ipratropium (ATROVENT) 0.03 % nasal spray 2 sprays into the nostril(s) as directed by provider 3 (Three) Times a Day. 2/9/23   Dolores Torres APRN   ipratropium-albuterol (DUO-NEB) 0.5-2.5 mg/3 ml nebulizer Take 3 mL by nebulization 4 (Four) Times a Day As Needed for Wheezing. 8/16/22   Dolores Torres APRN   ketoconazole (NIZORAL) 2 % shampoo MASSAGE INTO SCALP 15-20 MINUTES BEFORE SHOWER, LET SIT, THEN RINSE. 6/22/22   Angel Lino MD   lisinopril (PRINIVIL,ZESTRIL) 10 MG tablet Take 10 mg by mouth Daily. 2/9/23 2/9/24  Angel Lino MD   lisinopril (PRINIVIL,ZESTRIL) 2.5 MG tablet Take 2.5 mg by mouth Daily.    Angel Lino MD   LORazepam (ATIVAN) 0.5 MG tablet Take 0.5 mg by mouth 2 (Two) Times a Day. 5/23/21   Angel Lino MD   metoprolol succinate XL (TOPROL-XL) 50 MG 24 hr tablet Take 50 mg by mouth 2 (Two) Times a Day. 3/16/22   Angel Lino MD   metoprolol-hydrochlorothiazide (LOPRESSOR HCT) 50-25 MG per tablet Take 1 tablet by mouth 2 (Two) Times a Day.    Angel Lino MD   metroNIDAZOLE (METROGEL) 0.75 % gel  6/24/22   Angel Lino MD   multivitamin with minerals tablet tablet Take 1 tablet by mouth Daily.    Angel Lino MD   mupirocin (BACTROBAN) 2 % ointment  3/1/22   Angel Lino MD   omeprazole (priLOSEC) 20 MG capsule Take 20 mg by mouth Daily. 6/3/21   Angel Lino MD   predniSONE (DELTASONE) 10 MG tablet Take 1 PO for 7 days 10/10/22   Dolores Torres APRN   rOPINIRole (REQUIP) 0.25 MG tablet Take 0.25 mg by mouth Every Night. Take 1 hour before bedtime.    Angel Lino MD   rosuvastatin (CRESTOR) 20 MG tablet Take 20 mg by mouth Daily.    [place] MD Angel   sucralfate (CARAFATE) 1 g tablet TAKE 1 TABLET BY MOUTH TWICE A DAY TAKE ON AN EMPTY STOMACH 2/2/22   Angel Lino MD   theophylline (UNIPHYL) 400 MG 24 hr tablet Take 400 mg by mouth Daily. 5/11/21   Provider  "MD Angel   traMADol (ULTRAM) 50 MG tablet Take 50 mg by mouth 2 (Two) Times a Day. 22   Provider, MD Angel        Social History:   Social History     Tobacco Use   • Smoking status: Former     Packs/day: 0.50     Years: 20.00     Pack years: 10.00     Types: Cigarettes     Quit date: 1992     Years since quittin.2   • Smokeless tobacco: Never   • Tobacco comments:     social smoker   Vaping Use   • Vaping Use: Never used   Substance Use Topics   • Alcohol use: Never   • Drug use: No         Review of Systems:  Review of Systems   Constitutional: Negative for chills and fever.   HENT: Negative for congestion, rhinorrhea and sore throat.    Eyes: Negative for pain and visual disturbance.   Respiratory: Negative for apnea, cough, chest tightness and shortness of breath.    Cardiovascular: Negative for chest pain and palpitations.   Gastrointestinal: Positive for abdominal pain, blood in stool (black) and diarrhea. Negative for nausea and vomiting.   Genitourinary: Negative for difficulty urinating and dysuria.   Musculoskeletal: Negative for joint swelling and myalgias.   Skin: Negative for color change.   Neurological: Negative for seizures and headaches.   Psychiatric/Behavioral: Negative.    All other systems reviewed and are negative.       Physical Exam:  /49   Pulse 73   Resp 15   Ht 131.3 cm (51.69\")   Wt 66.1 kg (145 lb 11.6 oz)   SpO2 97%   BMI 38.34 kg/m²     Physical Exam  Vitals and nursing note reviewed.   Constitutional:       General: She is not in acute distress.     Appearance: Normal appearance. She is not toxic-appearing.   HENT:      Head: Normocephalic and atraumatic.      Jaw: There is normal jaw occlusion.   Eyes:      General: Lids are normal.      Extraocular Movements: Extraocular movements intact.      Conjunctiva/sclera: Conjunctivae normal.      Pupils: Pupils are equal, round, and reactive to light.   Cardiovascular:      Rate and Rhythm: Normal rate " and regular rhythm.      Pulses: Normal pulses.      Heart sounds: Normal heart sounds.   Pulmonary:      Effort: Pulmonary effort is normal. No respiratory distress.      Breath sounds: Normal breath sounds. No wheezing or rhonchi.   Abdominal:      General: Abdomen is flat.      Palpations: Abdomen is soft.      Tenderness: There is abdominal tenderness (generalized). There is no guarding or rebound.   Musculoskeletal:         General: Normal range of motion.      Cervical back: Normal range of motion and neck supple.      Right lower leg: No edema.      Left lower leg: No edema.   Skin:     General: Skin is warm and dry.   Neurological:      Mental Status: She is alert and oriented to person, place, and time. Mental status is at baseline.   Psychiatric:         Mood and Affect: Mood normal.                  Procedures:  Procedures      Medical Decision Making:      Comorbidities that affect care:    Hyperlipidemia, Asthma, Diabetes, Hypertension    External Notes reviewed:    Previous Clinic Note: Patient was seen at PCPs office for COPD.      The following orders were placed and all results were independently analyzed by me:  Orders Placed This Encounter   Procedures   • Blood Culture - Blood,   • Blood Culture - Blood,   • XR Chest 1 View   • CT Abdomen Pelvis Without Contrast   • [place] Draw   • Comprehensive Metabolic Panel   • Magnesium   • Single High Sensitivity Troponin T   • CBC Auto Differential   • Lactic Acid, Plasma   • STAT Lactic Acid, Reflex   • STAT Lactic Acid, Reflex   • NPO Diet NPO Type: Strict NPO   • Undress & Gown   • Cardiac Monitoring   • Continuous Pulse Oximetry   • Measure Blood Pressure   • Vital Signs   • Orthostatic Blood Pressure   • Inpatient Hospitalist Consult   • General MD Inpatient Consult   • Oxygen Therapy- Nasal Cannula; 2 LPM; Titrate for SPO2: 92%   • Pulse Oximetry   • ECG 12 Lead ED Triage Standing Order; Dysrhythmia   • Type & Screen   • ABO RH Specimen Verification    • Insert Peripheral IV   • Inpatient Admission   • CBC & Differential   • Green Top (Gel)   • Lavender Top   • Gold Top - SST   • Light Blue Top       Medications Given in the Emergency Department:  Medications   sodium chloride 0.9 % flush 10 mL (has no administration in time range)   DOPamine 400 mg in 250 mL D5W infusion (11.982 mcg/kg/min × 66.1 kg Intravenous Rate/Dose Change 3/23/23 0024)   atropine sulfate injection 1 mg (1 mg Intravenous Given 3/22/23 1757)   sodium chloride 0.9 % bolus 1,000 mL (0 mL Intravenous Stopped 3/22/23 1838)   sodium chloride 0.9 % bolus 1,000 mL (0 mL Intravenous Stopped 3/22/23 2058)   dextrose (D50W) (25 g/50 mL) IV injection 50 mL (50 mL Intravenous Given 3/22/23 2105)   insulin regular (humuLIN R,novoLIN R) injection 10 Units (10 Units Intravenous Given 3/22/23 2108)   sodium bicarbonate injection 8.4% 50 mEq (50 mEq Intravenous Given 3/22/23 2051)   calcium gluconate 1g/50ml 0.675% NaCl IV SOLN (0 g Intravenous Stopped 3/22/23 2109)   albuterol (PROVENTIL) nebulizer solution 0.5% 2.5 mg/0.5mL (10 mg Nebulization Given 3/22/23 2042)   sodium zirconium cyclosilicate (LOKELMA) pack 10 g (10 g Oral Given 3/22/23 2315)   piperacillin-tazobactam (ZOSYN) 3.375 g in iso-osmotic dextrose 50 ml (premix) (0 g Intravenous Stopped 3/23/23 0022)        ED Course:         Labs:    Lab Results (last 24 hours)     Procedure Component Value Units Date/Time    CBC & Differential [[id number]]  (Abnormal) Collected: 03/22/23 1807    Specimen: Blood Updated: 03/22/23 1826    Narrative:      The following orders were created for panel order CBC & Differential.  Procedure                               Abnormality         Status                     ---------                               -----------         ------                     CBC Auto Differential[[id number]]        Abnormal            Final result                 Please view results for these tests on the individual orders.    Comprehensive  Metabolic Panel [[id number]]  (Abnormal) Collected: 03/22/23 1807    Specimen: Blood Updated: 03/22/23 1905     Glucose 317 mg/dL      BUN 64 mg/dL      Creatinine 3.46 mg/dL      Sodium 127 mmol/L      Potassium 5.8 mmol/L      Comment: Slight hemolysis detected by analyzer. Results may be affected.        Chloride 93 mmol/L      CO2 19.5 mmol/L      Calcium 9.0 mg/dL      Total Protein 5.8 g/dL      Albumin 3.6 g/dL      ALT (SGPT) 57 U/L      AST (SGOT) 40 U/L      Comment: Slight hemolysis detected by analyzer. Results may be affected.        Alkaline Phosphatase 108 U/L      Total Bilirubin 0.2 mg/dL      Globulin 2.2 gm/dL      A/G Ratio 1.6 g/dL      BUN/Creatinine Ratio 18.5     Anion Gap 14.5 mmol/L      eGFR 11.9 mL/min/1.73      Comment: <15 Indicative of kidney failure       Narrative:      GFR Normal >60  Chronic Kidney Disease <60  Kidney Failure <15    The GFR formula is only valid for adults with stable renal function between ages 18 and 70.    Magnesium [[id number]]  (Normal) Collected: 03/22/23 1807    Specimen: Blood Updated: 03/22/23 1854     Magnesium 2.0 mg/dL     Single High Sensitivity Troponin T [[id number]]  (Abnormal) Collected: 03/22/23 1807    Specimen: Blood Updated: 03/22/23 1917     HS Troponin T 174 ng/L     Narrative:      High Sensitive Troponin T Reference Range:  <10.0 ng/L- Negative Female for AMI  <15.0 ng/L- Negative Male for AMI  >=10 - Abnormal Female indicating possible myocardial injury.  >=15 - Abnormal Male indicating possible myocardial injury.   Clinicians would have to utilize clinical acumen, EKG, Troponin, and serial changes to determine if it is an Acute Myocardial Infarction or myocardial injury due to an underlying chronic condition.         CBC Auto Differential [[id number]]  (Abnormal) Collected: 03/22/23 1807    Specimen: Blood Updated: 03/22/23 1826     WBC 11.68 10*3/mm3      RBC 4.43 10*6/mm3      Hemoglobin 12.9 g/dL      Hematocrit 41.3 %      MCV 93.2 fL       MCH 29.1 pg      MCHC 31.2 g/dL      RDW 13.4 %      RDW-SD 45.7 fl      MPV 9.6 fL      Platelets 213 10*3/mm3      Neutrophil % 77.9 %      Lymphocyte % 15.2 %      Monocyte % 6.1 %      Eosinophil % 0.1 %      Basophil % 0.3 %      Immature Grans % 0.4 %      Neutrophils, Absolute 9.11 10*3/mm3      Lymphocytes, Absolute 1.77 10*3/mm3      Monocytes, Absolute 0.71 10*3/mm3      Eosinophils, Absolute 0.01 10*3/mm3      Basophils, Absolute 0.03 10*3/mm3      Immature Grans, Absolute 0.05 10*3/mm3      nRBC 0.0 /100 WBC     Lactic Acid, Plasma [[id number]]  (Abnormal) Collected: 03/22/23 1807    Specimen: Blood Updated: 03/22/23 1917     Lactate 3.6 mmol/L     STAT Lactic Acid, Reflex [[id number]]  (Abnormal) Collected: 03/22/23 2117    Specimen: Blood Updated: 03/22/23 2201     Lactate 2.3 mmol/L     Blood Culture - Blood, Arm, Left [[id number]] Collected: 03/22/23 2314    Specimen: Blood from Arm, Left Updated: 03/22/23 2323    Blood Culture - Blood, Arm, Left [[id number]] Collected: 03/22/23 2314    Specimen: Blood from Arm, Left Updated: 03/22/23 2322    STAT Lactic Acid, Reflex [[id number]] Collected: 03/23/23 0043    Specimen: Blood Updated: 03/23/23 0046           Imaging:    CT Abdomen Pelvis Without Contrast    Result Date: 3/22/2023  PROCEDURE: CT ABDOMEN PELVIS WO CONTRAST  COMPARISON: [facility], CT, CT ABDOMEN PELVIS WO CONTRAST, 12/20/2021, 16:43.  [facility], CT, CT CHEST WO CONTRAST DIAGNOSTIC, 5/04/2022, 16:02.  INDICATIONS: lt flank pain, bloody stool  TECHNIQUE: CT images were created without intravenous contrast.   PROTOCOL:   Standard imaging protocol performed    RADIATION:   DLP: 516.5mGy*cm   Automated exposure control was utilized to minimize radiation dose.  FINDINGS:  Small right pleural effusion is evident.  Subsegmental atelectasis and/or linear fibrosis is seen at the lung bases.  The liver is of normal size.  The gallbladder is absent, surgical clips are  seen in the gallbladder bed.  No pancreatic or adrenal mass is evident.  The spleen is of normal size.  The right kidney has an unremarkable noncontrast enhanced appearance.  The left kidney is small, consistent with chronic ischemia.  1 cm hyperdense lesion in the lower pole left kidney is stable.  Fusiform infrarenal abdominal aortic aneurysm measures 3.5 cm in maximal AP diameter, previously 3.3 cm.  Saccular outpouching from the aneurysm is seen on the left anteriorly seen on image 98 measuring 2.6 cm by 1.5 cm, which also appears larger.  The common iliac arteries each measure 1.7 cm in diameter.  Bowel loops are not abnormally dilated.  Moderate diverticulosis of the descending colon and sigmoid colon is evident.  An ill-defined fluid collection is seen in the left upper and mid abdomen laterally measuring 5 cm in greatest dimension.  Inflammatory changes seen in abdominal fat.  Evaluation of abdominal organs and bowel is limited without contrast.  Streak artifact from the arms heavily obscures detail.  CONTINUED ON NEXT PAGE...    No free air is evident.  Degenerative changes are seen in the lower thoracic and lumbar spine.        CT scan of the abdomen and pelvis without contrast demonstrating small right pleural effusion with subsegmental atelectasis and/or linear fibrosis at the lung bases.  Post cholecystectomy.  Small left kidney consistent with chronic ischemia.  1 cm hyperdense lesion in the lower pole left kidney is stable.  3.5 cm fusiform infrarenal abdominal aortic aneurysm, previously 3.3 cm.  2.6 cm x 1.5 cm saccular outpouching arising from the anterior inferior aorta also appears larger.  Moderate diverticulosis of the descending colon and sigmoid colon.  Ill-defined fluid collection in the left upper and mid abdomen.  Inflammatory changes are seen in abdominal fat.  Evaluation of abdominal organs and bowel is limited without contrast.      KEATON JIMENEZ MD       Electronically Signed and  Approved By: KEATON JIMENEZ MD on 3/22/2023 at 21:10             XR Chest 1 View    Result Date: 3/22/2023  PROCEDURE: XR CHEST 1 VW  COMPARISON: [facility], CR, XR CHEST 2 VW, 10/10/2022, 17:17.  INDICATIONS: HYPOTENSION, SLOW HEART RATE TODAY  FINDINGS:  The heart is normal in size.  The lungs are well-expanded.  No airspace disease is evident.  Bony structures appear intact.       No active disease is seen.       KEATON JIMENEZ MD       Electronically Signed and Approved By: KEATON JIMENEZ MD on 3/22/2023 at 19:21                 Differential Diagnosis and Discussion:    Abdominal Pain: Based on the patient's signs and symptoms, I considered abdominal aortic aneurysm, small bowel obstruction, pancreatitis, acute cholecystitis, acute appendecitis, peptic ulcer disease, gastritis, colitis, endocrine disorders, irritable bowel syndrome and other differential diagnosis an etiology of the patient's abdominal pain.  Diarrhea: Differential diagnosis includes but is not limited to malabsorption syndrome, bacterial infection, carcinoid syndrome, pancreatic hypersecretion, viral infection, celiac sprue, Crohn's disease, ulcerative colitis, ischemic colitis, colitis, hypermotility, and irritable bowel syndrome.  Metabolic: Differential diagnosis includes but is not limited to hypertension, hyperglycemia, hyperkalemia, hypocalcemia, metabolic acidosis, hypokalemia, hypoglycemia, malnutrition, hypothyroidism, hyperthyroidism, and adrenal insufficiency.     All labs were reviewed and interpreted by me.  All X-rays were independently reviewed by me.  EKG was interpreted by me.  CT scan radiology interpretation was reviewed by me.    MDM  Number of Diagnoses or Management Options  Hyperkalemia  Diagnosis management comments: The patient´s CBC that was reviewed and interpreted by me shows no abnormalities of critical concern. Of note, there is no anemia requiring a blood transfusion and the platelet count is  acceptable.  CMP shows a BUN of 64 and a creatinine of 3.46.  Potassium is 5.8.  Bicarb is 19.5.  Lactic acid is 2.3.  Patient's initial blood pressure was low with 85/43.  CT scan abdomen pelvis is negative for acute intra-abdominal pathology.  It does show a known aortic aneurysm.  Chest x-ray is negative for infiltrate.  Patient was started on dopamine emergency department and did have increase in blood pressure and heart rate.  Patient also given albuterol, Lokelma, bicarb, calcium, insulin for hyperkalemia.  Case was discussed with Dr. Cee who agrees to consult.  Case was discussed with Dr. Antunez who agrees with admission.       Amount and/or Complexity of Data Reviewed  Clinical lab tests: reviewed  Tests in the radiology section of CPT®: reviewed  Tests in the medicine section of CPT®: reviewed  Discussion of test results with the performing providers: yes  Discuss the patient with other providers: yes  Independent visualization of images, tracings, or specimens: yes    Risk of Complications, Morbidity, and/or Mortality  Presenting problems: moderate  Management options: moderate    Patient Progress  Patient progress: stable       Critical Care Note: Total Critical Care time of 55 minutes. Total critical care time documented does not include time spent on separately billed procedures for services of nurses or physician assistants. I personally saw and examined the patient. I have reviewed all diagnostic interpretations and treatment plans as written. I was present for the key portions of any procedures performed and the inclusive time noted in any critical care statement. Critical care time includes patient management by me, time spent at the patients bedside,  time to review lab and imaging results, discussing patient care, documentation in the medical record, and time spent with family or caregiver.    Patient Care Considerations:    PERC: I used the PERC score to risk stratify the patient for PE and a  CT of the chest was considered but ultimately not indicated in today's visit.      Consultants/Shared Management Plan:    Hospitalist: I have discussed the case with Dr. Ricketts who agrees to accept the patient for admission.  Consultant: I have discussed the case with Dr. Hitchcock who agrees to consult on the patient.    Social Determinants of Health:    Patient has presented with family members who are responsible, reliable and will ensure follow up care.      Disposition and Care Coordination:    Admit:   Through independent evaluation of the patient's history, physical, and imperical data, the patient meets criteria for observation/admission to the hospital.        Final diagnoses:   Hyperkalemia        ED Disposition     ED Disposition   Decision to Admit    Condition   --    Comment   Level of Care: Progressive Care [20]   Diagnosis: Bradycardia [[id number]]   Admitting Physician: MAXX RICKETTS [[id number]]   Certification: I Certify That Inpatient Hospital Services Are Medically Necessary For Greater Than 2 Midnights               This medical record created using voice recognition software.    Documentation assistance provided by Mikayla Jurado acting as scribe for Miquel Chin MD. Information recorded by the scribe was done at my direction and has been verified and validated by me.          Mikayla Jurado  03/22/23 [id number]       Miquel Chin MD  03/23/23 [id number]

## 2023-03-22 NOTE — ED NOTES
Patient arrived from triage, upon immediate assessment pulse was in the 30's. Critical care team notified by RN. IV access initiated and RN administered atropine 1mg. Patient EKG obtained and DR notified. Due to bradycardia, patient placed on defribillator pads and monitored. Monitor timer set to q5 mins. Orders received for dopamine and IV fluids. Patient resting comfortably at this time with family at bedside. RN in room at this time.

## 2023-03-22 NOTE — Clinical Note
Level of Care: Progressive Care [20]   Diagnosis: Bradycardia [626611]   Admitting Physician: MAXX RICKETTS [[id number]]   Certification: I Certify That Inpatient Hospital Services Are Medically Necessary For Greater Than 2 Midnights  
Alert and oriented to person, place and time

## 2023-03-23 PROBLEM — R00.1 BRADYCARDIA: Status: ACTIVE | Noted: 2023-01-01

## 2023-03-23 NOTE — H&P
[facility]   HISTORY AND PHYSICAL    Patient Name: Laury Palacios  : 1930  MRN: [id number]  Primary Care Physician:  Doni Antunez MD  Date of admission: 3/22/2023    Subjective   Subjective     Chief Complaint: Bradycardia and hypotension probable dehydration elevated serum potassium which has reverted back to normal since she is in the unit patient has already been seen by cardiologist and further cardiology consultation pending patient alert oriented not in acute distress at this time she has had recurrent urinary tract infections cultures will be    HPI: Patient with history of diabetes lung cancer    Laury Palacios is a 89 y.o. female patient has lung cancer dehydration diabetes mellitus and history of kidney disease    Review of Systems   All systems were reviewed and negative except for: Reviewed    Personal History     Past Medical History:   Diagnosis Date   • Acid reflux    • Anemia    • Arthritis    • Asthma    • Bladder disorder    • Chronic renal failure    • Condition not found     Ulcer   • DDD (degenerative disc disease), lumbar    • Diabetes mellitus (HCC)     TYPE TWO   • Dysuria    • Ectropion of left eye    • Gastric ulcer    • Hemorrhoids 2018    grade II   • High blood pressure    • History of transfusion    • Hyperlipidemia    • Hypertension    • Limb pain    • Limb swelling    • Long-term current use of insulin for diabetes mellitus (HCC)    • Melanoma (HCC)     BACK   • Neck fracture (HCC)     C4C5 NO SURGERY   • Peripheral neuropathy    • Rectal bleeding    • Spinal stenosis    • Squamous cell carcinoma     FACE REMOVED   • Stroke (HCC)    • Stuttering    • TIA (transient ischemic attack)        Past Surgical History:   Procedure Laterality Date   • APPENDECTOMY     • BRONCHOSCOPY N/A 2021    Procedure: BRONCHOSCOPY WITH ENDOBRONCHIAL ULTRASOUND, RIGHT LOWER LOBE WASHINGS, BIOPSIES, BAL;  Surgeon: Glynn Mejia DO;  Location: [facility] ENDOSCOPY;  Service:  Pulmonary;  Laterality: N/A;  RIGHT LOWER LOBE ENDOBRONCHIAL MASS   • CAROTID ENDARTERECTOMY      BILAT, 6 YEARS APART IN 1990S   • CATARACT EXTRACTION Bilateral    • CHOLECYSTECTOMY     • COLONOSCOPY  01/15/2019    [facility]   • CYSTOSCOPY     • ECTROPION REPAIR Left 06/27/2017    Procedure: LEFT LOWER LID CICATRICIAL ECTROPION  FULL THICKNESS SKIN GRAFT WITH CARROLL SUTURE;  Surgeon: Heath Patterson MD;  Location: [facility] [place];  Service:    • ECTROPION REPAIR Right 09/12/2017    Procedure: RT LOWER LID LESION EXCISION W/ ROTATIONAL FLAP, RT LOWER CHEEK EXCISION W/ ROTATIONAL FLAP, LT LOWER LID CICATRICAL ECTROPIAN REPAIR, FULL THICKNESS SKIN  FROST SUTURE;  Surgeon: Heath Patterson MD;  Location: [facility] [place];  Service:    • ENDOSCOPY  01/15/2019    [facility]   • TONSILLECTOMY     • UPPER GASTROINTESTINAL ENDOSCOPY         Family History: family history includes Breast cancer in her sister; Melanoma in her sister; No Known Problems in her father and mother; Ovarian cancer in her sister. Otherwise pertinent FHx was reviewed and not pertinent to current issue.    Social History:  reports that she quit smoking about 31 years ago. Her smoking use included cigarettes. She has a 10.00 pack-year smoking history. She has never used smokeless tobacco. She reports that she does not drink alcohol and does not use drugs.    Home Medications:  Diclofenac Sodium, Insulin Pen Needle, LORazepam, albuterol sulfate HFA, amLODIPine, bacitracin, benzonatate, calcium-vitamin D, cephalexin, clindamycin, clobetasol, dexamethasone, ergocalciferol, folic acid, furosemide, gabapentin, glucose blood, hydrocortisone, insulin aspart, insulin detemir, ipratropium, ipratropium-albuterol, ketoconazole, lisinopril, metoprolol succinate XL, metoprolol-hydrochlorothiazide, metroNIDAZOLE, multivitamin with minerals, mupirocin, omeprazole, predniSONE, rOPINIRole, rosuvastatin, sucralfate, theophylline, and traMADol      Allergies:  Allergies    Allergen Reactions   • Ciprofloxacin Rash       Objective   Objective     Vitals:   Temp:  [97.9 °F (36.6 °C)-99.4 °F (37.4 °C)] 98.3 °F (36.8 °C)  Heart Rate:  [37-82] 65  Resp:  [15-20] 20  BP: ()/(31-97) 54/31  Flow (L/min):  [4] 4  Physical Exam    Constitutional: Alert oriented not in acute distress   Eyes: Pupils equal reacting to light and accommodation   HENT: Normal   Neck: No lymphadenopathy   Respiratory: No rales or rhonchi   Cardiovascular: Patient has history of bradycardia   Gastrointestinal: No change   Musculoskeletal: No change   Neurologic: Diabetic neuropathy  Skin: Ecchymosis    Result Review    Result Review:  I have personally reviewed the results from the time of this admission to 3/23/2023 08:11 EDT and agree with these findings:  [x]  Laboratory elevated BUN/creatinine and hyperkalemia  []  Microbiology  []  Radiology  []  EKG/Telemetry   []  Cardiology/Vascular   []  Pathology  []  Old records  []  Other:  Most notable findings include: Elevated BUN/creatinine with hyperkalemia    Assessment & Plan   Assessment / Plan     Brief Patient Summary:  Laury Palacios is a 89 y.o. female who patient admitted with hypotension dehydration and hyperglycemia    Active Hospital Problems:  Active Hospital Problems    Diagnosis    • **Bradycardia    • Hyperkalemia        Plan:   Continue as per intensivist we will get cardiology consultation    DVT prophylaxis:  No DVT prophylaxis order currently exists.    CODE STATUS:    Medical Intervention Limits: NO intubation (DNI); Other  Level Of Support Discussed With: Patient  Code Status (Patient has no pulse and is not breathing): No CPR (Do Not Attempt to Resuscitate)  Medical Interventions (Patient has pulse or is breathing): Limited Support  Additional Medical Interventions Limits: dnr  Release to patient: Routine Release      Admission Status:  I believe this patient meets inpatient status.    Electronically signed by Doni Antunez MD, 03/23/23,  8:11 AM EDT.      Part of this note may be an electronic transcription/translation of spoken language to printed text using the Dragon Dictation System.

## 2023-03-23 NOTE — SIGNIFICANT NOTE
03/23/23 0930   Coping/Psychosocial   Observed Emotional State calm;cooperative   Verbalized Emotional State hopefulness   Trust Relationship/Rapport empathic listening provided   Family/Support Persons daughter;other (see comments)  (2 daughters)   Involvement in Care interacting with patient   Additional Documentation Spiritual Care (Group)   Spiritual Care   Use of Spiritual Resources prayer;spirituality for coping, indicated strong use of   Spiritual Care Source  initiative   Spiritual Care Follow-Up follow-up, none required as presently assessed   Response to Spiritual Care engaged in conversation;receptive of support;thanks expressed   Spiritual Care Interventions supportive conversation provided;prayer support provided   Spiritual Care Visit Type initial   Receptivity to Spiritual Care visit welcomed

## 2023-03-23 NOTE — CONSULTS
[facility]   Cardiology Consult Note    Patient Name: Laury Palacios  : 1930  MRN: [id number]  Primary Care Physician:  Doni Antunez MD  Referring Physician: No ref. provider found  Date of admission: 3/22/2023    Subjective   Subjective     Reason for Consult/ Chief Complaint: Question falls and abdominal pain    HPI:  Laury Palacios is a 89 y.o. female with past medical history significant for diabetes, hypertension and hyperlipidemia.  She is a very poor historian and tells me that she is here due to a fall.  Apparently she came in with abdominal pain, black stools and diarrhea.  She was found to have a very slow heart rate and was mildly hypotensive.  She was also found to have acute on chronic kidney disease.  She was on Toprol 50 twice daily at home along with apparent amlodipine 2.5 daily and lisinopril 10 daily.    Review of Systems   All systems were reviewed and negative except for: Appears not unreliable    Personal History     Past Medical History:   Diagnosis Date   • Acid reflux    • Anemia    • Arthritis    • Asthma    • Bladder disorder    • Chronic renal failure    • Condition not found     Ulcer   • DDD (degenerative disc disease), lumbar    • Diabetes mellitus (HCC)     TYPE TWO   • Dysuria    • Ectropion of left eye    • Gastric ulcer    • Hemorrhoids 2018    grade II   • High blood pressure    • History of transfusion    • Hyperlipidemia    • Hypertension    • Limb pain    • Limb swelling    • Long-term current use of insulin for diabetes mellitus (HCC)    • Melanoma (HCC)     BACK   • Neck fracture (HCC)     C4C5 NO SURGERY   • Peripheral neuropathy    • Rectal bleeding    • Spinal stenosis    • Squamous cell carcinoma     FACE REMOVED   • Stroke (HCC)    • Stuttering    • TIA (transient ischemic attack)         Past medical history reviewed      Family History: family history includes Breast cancer in her sister; Melanoma in her sister; No Known Problems in her father and  mother; Ovarian cancer in her sister. Otherwise pertinent FHx was reviewed and not pertinent to current issue.    Social History:  reports that she quit smoking about 31 years ago. Her smoking use included cigarettes. She has a 10.00 pack-year smoking history. She has never used smokeless tobacco. She reports that she does not drink alcohol and does not use drugs.    Home Medications:  Diclofenac Sodium, Insulin Pen Needle, LORazepam, albuterol sulfate HFA, amLODIPine, bacitracin, benzonatate, calcium-vitamin D, cephalexin, clindamycin, clobetasol, dexamethasone, ergocalciferol, folic acid, furosemide, gabapentin, glucose blood, hydrocortisone, insulin aspart, insulin detemir, ipratropium, ipratropium-albuterol, ketoconazole, lisinopril, metoprolol succinate XL, metoprolol-hydrochlorothiazide, metroNIDAZOLE, multivitamin with minerals, mupirocin, omeprazole, predniSONE, rOPINIRole, rosuvastatin, sucralfate, theophylline, and traMADol    Allergies:  Allergies   Allergen Reactions   • Ciprofloxacin Rash       Objective    Objective     Vitals:   Temp:  [97.9 °F (36.6 °C)-99.4 °F (37.4 °C)] 98.3 °F (36.8 °C)  Heart Rate:  [37-82] 65  Resp:  [15-20] 20  BP: ()/(31-97) 54/31  Flow (L/min):  [4] 4      Physical Exam:   Constitutional: Awake, alert, No acute distress    Eyes: PERRLA, sclerae anicteric, no conjunctival injection   HENT: NCAT, mucous membranes moist   Neck: Supple, no thyromegaly, no lymphadenopathy, trachea midline   Respiratory: Clear to auscultation bilaterally, nonlabored respirations    Cardiovascular: RRR, no murmurs, rubs, or gallops, palpable pedal pulses bilaterally   Gastrointestinal: Positive bowel sounds, soft, nontender, nondistended   Musculoskeletal: No bilateral ankle edema, no clubbing or cyanosis to extremities   Psychiatric: Appropriate affect, cooperative   Neurologic: Oriented x 3, strength symmetric in all extremities, Cranial Nerves grossly intact to confrontation, speech  clear   Skin: No rashes     Result Review    Result Review:  I have personally reviewed the results from the time of this admission to 3/23/2023 09:28 EDT and agree with these findings:  [x]  Laboratory  []  Microbiology  [x]  Radiology  [x]  EKG/Telemetry   [x]  Cardiology/Vascular   []  Pathology  [x]  Old records  []  Other:  Most notable findings include:     CMP    CMP 2/6/23 3/22/23 3/23/23 3/23/23      0329 0807   Glucose 274 (A) 317 (A) 505 (A) 468 (A)   BUN 74 (A) 64 (A) 68 (A) 67 (A)   Creatinine 2.23 (A) 3.46 (A) 3.56 (A) 3.52 (A)   eGFR 20.2 (A) 11.9 (A) 11.5 (A) 11.7 (A)   Sodium 135 (A) 127 (A) 128 (A) 133 (A)   Potassium 4.9 5.8 (A) 4.4 4.3   Chloride 98 93 (A) 93 (A) 95 (A)   Calcium 10.2 (A) 9.0 9.1 8.9   Total Protein  5.8 (A)     Albumin 4.2 3.6     Globulin  2.2     Total Bilirubin  0.2     Alkaline Phosphatase  108     AST (SGOT)  40 (A)     ALT (SGPT)  57 (A)     Albumin/Globulin Ratio  1.6     BUN/Creatinine Ratio 33.2 (A) 18.5 19.1 19.0   Anion Gap 9.7 14.5 19.8 (A) 20.7 (A)   (A) Abnormal value       Comments are available for some flowsheets but are not being displayed.            CBC    CBC 2/6/23 3/22/23 3/23/23   WBC 6.61 11.68 (A) 19.03 (A)   RBC 4.55 4.43 4.34   Hemoglobin 13.0 12.9 12.6   Hematocrit 40.5 41.3 39.6   MCV 89.0 93.2 91.2   MCH 28.6 29.1 29.0   MCHC 32.1 31.2 (A) 31.8   RDW 13.7 13.4 13.1   Platelets 210 213 243   (A) Abnormal value             Lab Results   Component Value Date    TROPONINT 174 (C) 03/22/2023         Assessment & Plan   Assessment / Plan     Brief Patient Summary:  Laury Palacios is a 89 y.o. female who has a history of diabetes, hypertension and hyperlipidemia.  The patient is not able to give an adequate history.  It appears she presented with abdominal pain, black stools and diarrhea.  She was found to be very bradycardic and hypotensive.  She had significant acute on chronic kidney disease.    Active Hospital Problems:  Active Hospital Problems     Diagnosis    • **Bradycardia    • Hyperkalemia        Assessment:  1.  Junctional bradycardia  2.  Mild hyperkalemia  3.  Hypotension  4.  Acute on chronic kidney disease    Plan:   1.  Patient apparently presented with multiple abdominal complaints.  2.  Got a double amplitude EKG this morning that appears to show junctional bradycardia as in V1 I do see P waves after the QRS.  3.  We are just going to watch as patient's Toprol 50 mg twice daily slowly wears off.  She is currently on a dopamine drip.  We will try to wean this off as tolerated.  4.  Patient's potassium was mildly elevated but has completely come back to normal.  I do not think this was significantly affecting her heart rate.  5.  Agree with holding amlodipine and lisinopril.  6.  Patient's beta natruretic peptide is elevated in the setting of acute on chronic kidney disease.  She notes no shortness of breath and appears euvolemic.  I do think we can hydrate her to see if her blood pressure and serum creatinine improve      Electronically signed by Lopez Lopez MD, 03/23/23, 9:28 AM EDT.

## 2023-03-23 NOTE — PLAN OF CARE
Goal Outcome Evaluation:            VSS. Dopamine @ 14. Vaso @ 0.04. Given two 2g Mg runs. Patient remains NPO. Abdominal ultrasound done. Echo scheduled for tomorrow. Will continue to monitor patient.

## 2023-03-23 NOTE — CONSULTS
[facility]   Consult Note    Patient Name: Laury Palacios  : 1930  MRN: [id number]  Primary Care Physician:  Doni Antunez MD  Referring Physician: No Known Provider  Date of admission: 3/22/2023    Consults  Subjective   Subjective     Reason for Consult/ Chief Complaint: Reason for consultation critical care management    History of Present Illness  Laury Palacios is a 89 y.o. female patient critically ill in the ICU with bradycardia and hypotension  On dopamine  Found to have DKA  Has been having nausea and vomiting    Review of Systems   Positive for nausea and vomiting  Personal History     Past Medical History:   Diagnosis Date   • Acid reflux    • Anemia    • Arthritis    • Asthma    • Bladder disorder    • Chronic renal failure    • Condition not found     Ulcer   • DDD (degenerative disc disease), lumbar    • Diabetes mellitus (HCC)     TYPE TWO   • Dysuria    • Ectropion of left eye    • Gastric ulcer    • Hemorrhoids 2018    grade II   • High blood pressure    • History of transfusion    • Hyperlipidemia    • Hypertension    • Limb pain    • Limb swelling    • Long-term current use of insulin for diabetes mellitus (HCC)    • Melanoma (HCC)     BACK   • Neck fracture (HCC)     C4C5 NO SURGERY   • Peripheral neuropathy    • Rectal bleeding    • Spinal stenosis    • Squamous cell carcinoma     FACE REMOVED   • Stroke (HCC)    • Stuttering    • TIA (transient ischemic attack)        Past Surgical History:   Procedure Laterality Date   • APPENDECTOMY     • BRONCHOSCOPY N/A 2021    Procedure: BRONCHOSCOPY WITH ENDOBRONCHIAL ULTRASOUND, RIGHT LOWER LOBE WASHINGS, BIOPSIES, BAL;  Surgeon: Glynn Mejia DO;  Location: [facility] ENDOSCOPY;  Service: Pulmonary;  Laterality: N/A;  RIGHT LOWER LOBE ENDOBRONCHIAL MASS   • CAROTID ENDARTERECTOMY      BILAT, 6 YEARS APART IN    • CATARACT EXTRACTION Bilateral    • CHOLECYSTECTOMY     • COLONOSCOPY  01/15/2019    Elder   • CYSTOSCOPY      • ECTROPION REPAIR Left 06/27/2017    Procedure: LEFT LOWER LID CICATRICIAL ECTROPION  FULL THICKNESS SKIN GRAFT WITH CARROLL SUTURE;  Surgeon: Heath Patterson MD;  Location: [facility];  Service:    • ECTROPION REPAIR Right 09/12/2017    Procedure: RT LOWER LID LESION EXCISION W/ ROTATIONAL FLAP, RT LOWER CHEEK EXCISION W/ ROTATIONAL FLAP, LT LOWER LID CICATRICAL ECTROPIAN REPAIR, FULL THICKNESS SKIN  FROST SUTURE;  Surgeon: Heath Patterson MD;  Location: [facility];  Service:    • ENDOSCOPY  01/15/2019    [facility]   • TONSILLECTOMY     • UPPER GASTROINTESTINAL ENDOSCOPY         Family History: family history includes Breast cancer in her sister; Melanoma in her sister; No Known Problems in her father and mother; Ovarian cancer in her sister. Otherwise pertinent FHx was reviewed and not pertinent to current issue.    Social History:  reports that she quit smoking about 31 years ago. Her smoking use included cigarettes. She has a 10.00 pack-year smoking history. She has never used smokeless tobacco. She reports that she does not drink alcohol and does not use drugs.    Home Medications:   Diclofenac Sodium, LORazepam, albuterol sulfate HFA, calcium-vitamin D, cephalexin, clindamycin, clobetasol, ergocalciferol, folic acid, furosemide, gabapentin, hydrocortisone, insulin aspart, insulin detemir, ipratropium, ipratropium-albuterol, ketoconazole, lisinopril, metoprolol succinate XL, multivitamin with minerals, mupirocin, omeprazole, rOPINIRole, sucralfate, theophylline, and traMADol    Allergies:  Allergies   Allergen Reactions   • Ciprofloxacin Rash       Objective    Objective     Vitals:  Temp:  [97.9 °F (36.6 °C)-99.4 °F (37.4 °C)] 98.5 °F (36.9 °C)  Heart Rate:  [37-82] 66  Resp:  [15-20] 20  BP: ()/(31-97) 108/48  Flow (L/min):  [4] 4    Physical Exam  Vital Signs Reviewed  General frail elderly ill-appearing female  Chest: Sounds both bases diminished   CV: RRR, no MGR, .  EXT:  no  clubbing, no cyanosis, no edema, no joint tenderness  Neuro:  A&Ox3, CN grossly intact, no focal deficits.  Skin: No rashes or lesions noted  Result Review    Result Review:  I have personally reviewed the results from the time of this admission to 3/23/2023 14:11 EDT and agree with these findings:  [x]  Laboratory  [x]  Microbiology  [x]  Radiology  [x]  EKG/Telemetry   [x]  Cardiology/Vascular   [x]  Pathology  []  Old records  []  Other:    Most notable findings include:     Assessment & Plan   Assessment / Plan     Brief Patient Summary:   Laury Palacios is a 89 y.o. female who critically ill in the ICU with bradycardia, shock and renal failure    Active Hospital Problems:  Active Hospital Problems    Diagnosis    • **Bradycardia    • Hyperkalemia    Cardiogenic shock  ?  Septic shock  RANDY on chronic kidney failure  Diabetes  Abdominal pain  Anion gap metabolic acidosis  Hyponatremia  Lactic acidosis  Lung cancer  Diabetic ketoacidosis    Plan:   Place patient on dopamine for bradycardia/hypotension    Vasopressin if needed    We are using dopamine for treatment of bradycardia    Continue broad-spectrum antibiotics     Ultrasound patient's abdomen given increasing size of AAA    Start patient on insulin drip    Start IV fluids per DKA protocol      Patient is critically ill in ICU with  septic shock, bradycardia  I spent 33 minutes of critical care time excluding any procedure notes, spent in review, analysis, obtaining history and physical, formulating care plan, and I led multi-disciplinary critical care rounds with bedside nurse, respiratory therapist, clinical pharmacist and other allied services. I have discussed the case with primary service and other consultants as well.      Electronically signed by Glynn Mejia DO, 03/23/23, 2:11 PM EDT.

## 2023-03-23 NOTE — SIGNIFICANT NOTE
Wound Eval / Progress Noted    RAUL Oh     Patient Name: Laury Palacios  : 1930  MRN: [id number]  Today's Date: 3/23/2023                 Admit Date: 3/22/2023    Visit Dx:    ICD-10-CM ICD-9-CM   1. Chronic obstructive pulmonary disease, unspecified COPD type ([facility])  J44.9 496   2. Hyperkalemia  E87.5 276.7   3. Type 2 diabetes mellitus with hyperglycemia, unspecified whether long term insulin use ([facility])  E11.65 250.00       Patient Active Problem List   Diagnosis   • Osteoarthritis   • Asthma   • Chronic obstructive pulmonary disease ([facility])   • Depression   • Diabetes mellitus ([facility])   • Diabetic renal disease ([facility])   • Hemorrhoids   • Generalized anxiety disorder   • Gastro-esophageal reflux disease with esophagitis   • Edema   • Post-menopausal bleeding   • Neuropathy   • Mixed hyperlipidemia   • Melanoma ([facility])   • Long term current use of insulin ([facility])   • Limb swelling   • Hypothyroid   • Hypertension   • Vitamin D deficiency   • Ulcer disease   • Type 2 diabetes mellitus with hyperglycemia ([facility])   • Stuttering   • Stroke ([facility])   • Chronic kidney disease   • Restless legs syndrome   • Rectal bleeding   • S/P carotid endarterectomy   • Cellulitis and abscess of foot   • Cellulitis   • Hilar mass   • Hyperkalemia   • Neuralgia   • Proteinuria   • Retinal disorder   • Chronic UTI   • Other chronic pain   • Post-void dribbling   • Post-menopausal atrophic vaginitis   • Cystitis without hematuria   • Pain with urination   • Recurrent UTI   • Cellulitis of left thigh   • Insect bite of left thigh   • DDD (degenerative disc disease), lumbar   • Spinal stenosis   • Adenocarcinoma, lung, right ([facility])   • Candidal skin infection   • Absence of bladder continence   • AV (anaerobic vaginosis)   • Urethral spasm   • Bradycardia        Past Medical History:   Diagnosis Date   • Acid reflux    • Anemia    • Arthritis    • Asthma    • Bladder disorder    • Chronic renal failure    • Condition not found     Ulcer   • DDD  (degenerative disc disease), lumbar    • Diabetes mellitus (HCC)     TYPE TWO   • Dysuria    • Ectropion of left eye    • Gastric ulcer    • Hemorrhoids 2018    grade II   • High blood pressure    • History of transfusion    • Hyperlipidemia    • Hypertension    • Limb pain    • Limb swelling    • Long-term current use of insulin for diabetes mellitus (HCC)    • Melanoma (HCC)     BACK   • Neck fracture (HCC) 1986    C4C5 NO SURGERY   • Peripheral neuropathy    • Rectal bleeding    • Spinal stenosis    • Squamous cell carcinoma     FACE REMOVED   • Stroke (HCC)    • Stuttering    • TIA (transient ischemic attack)         Past Surgical History:   Procedure Laterality Date   • APPENDECTOMY     • BRONCHOSCOPY N/A 12/23/2021    Procedure: BRONCHOSCOPY WITH ENDOBRONCHIAL ULTRASOUND, RIGHT LOWER LOBE WASHINGS, BIOPSIES, BAL;  Surgeon: Glynn Mejia DO;  Location: [facility] ENDOSCOPY;  Service: Pulmonary;  Laterality: N/A;  RIGHT LOWER LOBE ENDOBRONCHIAL MASS   • CAROTID ENDARTERECTOMY      BILAT, 6 YEARS APART IN 1990S   • CATARACT EXTRACTION Bilateral    • CHOLECYSTECTOMY     • COLONOSCOPY  01/15/2019    [facility]   • CYSTOSCOPY     • ECTROPION REPAIR Left 06/27/2017    Procedure: LEFT LOWER LID CICATRICIAL ECTROPION  FULL THICKNESS SKIN GRAFT WITH CARROLL SUTURE;  Surgeon: Heath Patterson MD;  Location: [facility] OR [facility] [place] [facility];  Service:    • ECTROPION REPAIR Right 09/12/2017    Procedure: RT LOWER LID LESION EXCISION W/ ROTATIONAL FLAP, RT LOWER CHEEK EXCISION W/ ROTATIONAL FLAP, LT LOWER LID CICATRICAL ECTROPIAN REPAIR, FULL THICKNESS SKIN  FROST SUTURE;  Surgeon: Heath Patterson MD;  Location: [facility] OR [facility] [place];  Service:    • ENDOSCOPY  01/15/2019    [facility]   • TONSILLECTOMY     • UPPER GASTROINTESTINAL ENDOSCOPY           Physical Assessment:  Wound 03/23/23 0330 Left anterior knee (Active)   Wound Image   03/23/23 0330   Dressing Appearance intact;dry 03/23/23 0930   Closure None 03/23/23 0930   Base dry;red  03/23/23 0930   Red (%), Wound Tissue Color 100 03/23/23 0930   Periwound intact;dry 03/23/23 0930   Periwound Temperature warm 03/23/23 0930   Periwound Skin Turgor firm 03/23/23 0930   Edges open 03/23/23 0930   Drainage Amount none 03/23/23 0930   Care, Wound cleansed with;sterile normal saline 03/23/23 0930   Dressing Care dressing applied;silver impregnated;hydrofiber;silicone;border dressing 03/23/23 0930   Periwound Care absorptive dressing applied 03/23/23 0930       Wound 03/23/23 0330 Bilateral gluteal Pressure Injury (Active)   Wound Image   03/23/23 0330   Dressing Appearance intact;dry 03/23/23 0930   Closure None 03/23/23 0930   Base non-blanchable;dry;maroon/purple 03/23/23 0930   Periwound intact;blanchable;dry 03/23/23 0930   Periwound Temperature warm 03/23/23 0930   Periwound Skin Turgor soft 03/23/23 0930   Edges rolled/closed 03/23/23 0930   Drainage Amount none 03/23/23 0930   Care, Wound cleansed with;sterile normal saline 03/23/23 0930   Dressing Care dressing applied;silicone;border dressing;skin barrier agent applied 03/23/23 0930   Periwound Care barrier ointment applied 03/23/23 0930        Wound Check / Follow-up:  Patient seen today for a wound consult. Patient confused, unable to provide history at this time. Abrasions to left lateral knee with a red dry wound base and intermittent skin closure between the two wounds. Cleansed with NS. Recommend application of non-adherent petroleum gauze.   DTPI noted to the right gluteal aspect; tissue is purple and non-blanchable. Periwound is reddened and blanchable. Cleansed with NS. Applied blue top moisture barrier and covered with a silicone border dressing for preventative. Recommend to implement Q2H turns and offload heels at all times. Provide quality skin care and hygiene. Keep patient clean and free from moisture.     Impression: DTPI, traumatic injury    Short term goals:  Regain skin integrity, pressure reduction, skin protection,  moisture barrier cream, daily dressing changes    Orly Benton, NORMA    3/23/2023    12:00 EDT

## 2023-03-23 NOTE — CONSULTS
Patient Care Team:  Doni Antunez MD as PCP - General (Hematology and Oncology)  Kendra Archer MD (Endocrinology)  Estuardo Jefferson MD as Consulting Physician (Nephrology)    Chief complaint: RANDY/CKD3    Subjective     History of Present Illness  89yo with h/o CKD3 followed with Dr. Jefferson in our group.  She had presented to ER with abd pain and diarrhea.  She had noted bradycardia and hypotension.  Her creatinine had worsened to 3.5 and we were asked to see her.  She is currently on dopamine.  No acute complaints.  Family at bedside.    Review of Systems   Constitutional: Positive for fever. Negative for chills and diaphoresis.   Respiratory: Positive for shortness of breath. Negative for cough.    Cardiovascular: Negative for chest pain and leg swelling.   Gastrointestinal: Positive for abdominal pain and diarrhea.   Genitourinary: Negative for dysuria.   Musculoskeletal: Negative for back pain.        Past Medical History:   Diagnosis Date   • Acid reflux    • Anemia    • Arthritis    • Asthma    • Bladder disorder    • Chronic renal failure    • Condition not found     Ulcer   • DDD (degenerative disc disease), lumbar    • Diabetes mellitus (HCC)     TYPE TWO   • Dysuria    • Ectropion of left eye    • Gastric ulcer    • Hemorrhoids 2018    grade II   • High blood pressure    • History of transfusion    • Hyperlipidemia    • Hypertension    • Limb pain    • Limb swelling    • Long-term current use of insulin for diabetes mellitus (HCC)    • Melanoma (HCC)     BACK   • Neck fracture (HCC) 1986    C4C5 NO SURGERY   • Peripheral neuropathy    • Rectal bleeding    • Spinal stenosis    • Squamous cell carcinoma     FACE REMOVED   • Stroke (HCC)    • Stuttering    • TIA (transient ischemic attack)    ,   Past Surgical History:   Procedure Laterality Date   • APPENDECTOMY     • BRONCHOSCOPY N/A 12/23/2021    Procedure: BRONCHOSCOPY WITH ENDOBRONCHIAL ULTRASOUND, RIGHT LOWER LOBE WASHINGS, BIOPSIES, BAL;   Surgeon: Glynn Mejia DO;  Location: [facility] ENDOSCOPY;  Service: Pulmonary;  Laterality: N/A;  RIGHT LOWER LOBE ENDOBRONCHIAL MASS   • CAROTID ENDARTERECTOMY      BILAT, 6 YEARS APART IN    • CATARACT EXTRACTION Bilateral    • CHOLECYSTECTOMY     • COLONOSCOPY  01/15/2019    [facility]   • CYSTOSCOPY     • ECTROPION REPAIR Left 2017    Procedure: LEFT LOWER LID CICATRICIAL ECTROPION  FULL THICKNESS SKIN GRAFT WITH CARROLL SUTURE;  Surgeon: Heath Patterson MD;  [place] [facility] [place] [facility] [place]  Service:    • ECTROPION REPAIR Right 2017    Procedure: RT LOWER LID LESION EXCISION W/ ROTATIONAL FLAP, RT LOWER CHEEK EXCISION W/ ROTATIONAL FLAP, LT LOWER LID CICATRICAL ECTROPIAN REPAIR, FULL THICKNESS SKIN  FROST SUTURE;  Surgeon: Heath Patterson MD;  [place] [facility] [place] [facility] [place];  Service:    • ENDOSCOPY  01/15/2019    [facility]   • TONSILLECTOMY     • UPPER GASTROINTESTINAL ENDOSCOPY     ,   Family History   Problem Relation Age of Onset   • No Known Problems Mother    • No Known Problems Father    • Breast cancer Sister         30s   • Ovarian cancer Sister    • Melanoma Sister    • Malig Hyperthermia Neg Hx    ,   Social History     Socioeconomic History   • Marital status:    Tobacco Use   • Smoking status: Former     Packs/day: 0.50     Years: 20.00     Pack years: 10.00     Types: Cigarettes     Quit date: 1992     Years since quittin.2   • Smokeless tobacco: Never   • Tobacco comments:     social smoker   Vaping Use   • Vaping Use: Never used   Substance and Sexual Activity   • Alcohol use: Never   • Drug use: No   • Sexual activity: Defer     E-cigarette/Vaping   • E-cigarette/Vaping Use Never User      E-cigarette/Vaping Substances   • Nicotine No    • THC No    • CBD No    • Flavoring No      E-cigarette/Vaping Devices   • Disposable No    • Pre-filled or Refillable Cartridge No    • Refillable Tank No    • Pre-filled Pod No        ,   Facility-Administered Medications  Prior to Admission   Medication Dose Route Frequency Provider Last Rate Last Admin   • bupivacaine (MARCAINE) 0.5 % 10 mL, heparin (porcine) 10,000 Units, Hydrocortisone Sod Suc (PF) (Solu-CORTEF) 100 mg, sodium bicarbonate 4.2 % 7.5 mEq, Lidocaine Viscous HCl (XYLOCAINE) 2 % 12 mL, gentamicin (GARAMYCIN) 80 mg irrigation   Intracatheter Once PandeyShamika ho, APRN       • bupivacaine (MARCAINE) 0.5 % 10 mL, heparin (porcine) 20,000 Units, hydrocortisone sodium succinate (Solu-CORTEF) 100 mg, sodium bicarbonate 4.2 % 7.5 mEq, Lidocaine Viscous HCl (XYLOCAINE) 2 % 12 mL, gentamicin (GARAMYCIN) 80 mg irrigation   Intracatheter Once Shamika Pandey, APRN       • bupivacaine (MARCAINE) 0.5 % 10 mL, heparin (porcine) 5,000 Units, hydrocortisone sodium succinate (Solu-CORTEF) 100 mg, sodium bicarbonate 4.2 % 7.5 mEq, Lidocaine Viscous HCl (XYLOCAINE) 2 % 12 mL, gentamicin (GARAMYCIN) 80 mg irrigation   Intracatheter Once Shamika Pandey, APRN       • gentamicin (GARAMYCIN) injection 80 mg  80 mg Intravesical Once Shamika Pandey, APRN       • gentamicin (GARAMYCIN) injection 80 mg  80 mg Intramuscular Once Shamika Pandey, APRN       • gentamicin (GARAMYCIN) injection 80 mg  80 mg Intravesical Once PandeyShamika ho, APRN       • heparin (porcine) 91630 UNIT/ML injection 3,900 Units  60 Units/kg  irrigant Once Shamika Pandey, APRN       • heparin (porcine) 20,000 Units, hydrocortisone sodium succinate (Solu-CORTEF) 100 mg, sodium bicarbonate 8.4 % 15 mL, Lidocaine Viscous HCl (XYLOCAINE) 2 % 12 mL irrigation   Intracatheter Once Shamika Pandey, APRN       • heparin (porcine) 20,000 Units, hydrocortisone sodium succinate (Solu-CORTEF) 100 mg, sodium bicarbonate 8.4 % 15 mL, Lidocaine Viscous HCl (XYLOCAINE) 2 % 12 mL, gentamicin (GARAMYCIN) 80 mg irrigation   Intracatheter Once Shamika Pandey, APRN       • heparin (porcine) injection 10,000 Units  10,000 Units Intracatheter Once Pandey,  PADMINI Fraser       • heparin (porcine) injection 10,000 Units  10,000 Units Intravenous Once Rosi Flood MD       • heparin (porcine) injection 10,000 Units  10,000 Units Intravenous Once Shamika Pandey APRN       • hydrocortisone sodium succinate (Solu-CORTEF) injection 100 mg  100 mg Intravenous Q6H Shamika Pandey APRN       • hydrocortisone sodium succinate (Solu-CORTEF) injection 100 mg  100 mg Intravenous Q6H Rosi Flood MD       • Lidocaine Viscous HCl (XYLOCAINE) 2 % solution 6 mL  6 mL Mouth/Throat Once Shamika Pandey APRN       • Lidocaine Viscous HCl (XYLOCAINE) 2 % solution 6 mL  6 mL Mouth/Throat Once Shamika Pandey APRN       • sodium bicarbonate injection 8.4% 50 mEq  50 mEq Injection Once Shamika Pandey APRN       • sodium bicarbonate injection 8.4% 50 mEq  50 mEq Intravenous Once Shamika Pandey APRN       • sodium bicarbonate injection 8.4% 50 mEq  50 mEq Intravenous Once Shamika Pandey APRN       • sodium bicarbonate injection 8.4% 50 mEq  50 mEq Intravenous Once Shamika Pandey APRN       • [DISCONTINUED] gentamicin (GARAMYCIN) injection 80 mg  80 mg Intravesical Once Shamika Pandey APRN         Medications Prior to Admission   Medication Sig Dispense Refill Last Dose   • albuterol sulfate  (90 Base) MCG/ACT inhaler Inhale 2 puffs Every 4 (Four) Hours As Needed for Wheezing. 18 g 5    • amLODIPine (NORVASC) 2.5 MG tablet Take 2.5 mg by mouth Daily.      • bacitracin 500 UNIT/GM ointment 1 APPLICATION EXTERNALLY ONCE A DAY 7 DAY(S)      • BD Pen Needle Joi 2nd Gen 32G X 4 MM misc       • benzonatate (TESSALON) 200 MG capsule Take 1 capsule by mouth 3 (Three) Times a Day As Needed for Cough. 42 capsule 1    • calcium-vitamin D (OSCAL-500) 500-200 MG-UNIT per tablet Take 1 tablet by mouth 2 (Two) Times a Day.      • cephalexin (Keflex) 250 MG capsule Take 1 capsule by mouth Every Night. 30 capsule 1    • clindamycin  (CLEOCIN) 2 % vaginal cream May use large pea size vaginally every night as needed for vaginal infection 40 g 0    • clobetasol (TEMOVATE) 0.05 % external solution APPLY 10-15 DROPS TO THE AFFECTED AREAS IN THE SCALP ONCE DAILY UNTIL CLEAR      • Contour Next Test test strip USE AS DIRECTED IN VITRO 3 TIMES A DAY 90 DAYS      • dexamethasone (DECADRON) 6 MG tablet TAKE 1 TABLET BY MOUTH ONCE DAILY FOR 7 DAYS      • Diclofenac Sodium (VOLTAREN) 1 % gel gel       • ergocalciferol (ERGOCALCIFEROL) 1.25 MG (53371 UT) capsule Take 50,000 Units by mouth.      • folic acid (FOLVITE) 1 MG tablet Take 1 mg by mouth Daily.      • furosemide (LASIX) 40 MG tablet Take 40 mg by mouth Daily As Needed (FOR SWELLING).      • gabapentin (NEURONTIN) 300 MG capsule Take 300 mg by mouth 3 (Three) Times a Day.      • hydrocortisone 2.5 % cream APPLY TO AFFECTED AREA 4 TIMES A DAY      • insulin aspart (novoLOG FLEXPEN) 100 UNIT/ML solution pen-injector sc pen Inject 10 Units under the skin into the appropriate area as directed 3 (Three) Times a Day With Meals. BREAKFAST AND DINNER      • insulin detemir (LEVEMIR) 100 UNIT/ML injection Inject 40 Units under the skin into the appropriate area as directed 2 (Two) Times a Day. BREAKFAST AND DINNER      • ipratropium (ATROVENT) 0.03 % nasal spray 2 sprays into the nostril(s) as directed by provider 3 (Three) Times a Day. 30 mL 5    • ipratropium-albuterol (DUO-NEB) 0.5-2.5 mg/3 ml nebulizer Take 3 mL by nebulization 4 (Four) Times a Day As Needed for Wheezing. 360 mL 5    • ketoconazole (NIZORAL) 2 % shampoo MASSAGE INTO SCALP 15-20 MINUTES BEFORE SHOWER, LET SIT, THEN RINSE.      • lisinopril (PRINIVIL,ZESTRIL) 10 MG tablet Take 10 mg by mouth Daily.      • lisinopril (PRINIVIL,ZESTRIL) 2.5 MG tablet Take 2.5 mg by mouth Daily.      • LORazepam (ATIVAN) 0.5 MG tablet Take 0.5 mg by mouth 2 (Two) Times a Day.      • metoprolol succinate XL (TOPROL-XL) 50 MG 24 hr tablet Take 50 mg by mouth 2  (Two) Times a Day.      • metoprolol-hydrochlorothiazide (LOPRESSOR HCT) 50-25 MG per tablet Take 1 tablet by mouth 2 (Two) Times a Day.      • metroNIDAZOLE (METROGEL) 0.75 % gel       • multivitamin with minerals tablet tablet Take 1 tablet by mouth Daily.      • mupirocin (BACTROBAN) 2 % ointment       • omeprazole (priLOSEC) 20 MG capsule Take 20 mg by mouth Daily.      • predniSONE (DELTASONE) 10 MG tablet Take 1 PO for 7 days 30 tablet 0    • rOPINIRole (REQUIP) 0.25 MG tablet Take 0.25 mg by mouth Every Night. Take 1 hour before bedtime.      • rosuvastatin (CRESTOR) 20 MG tablet Take 20 mg by mouth Daily.      • sucralfate (CARAFATE) 1 g tablet TAKE 1 TABLET BY MOUTH TWICE A DAY TAKE ON AN EMPTY STOMACH      • theophylline (UNIPHYL) 400 MG 24 hr tablet Take 400 mg by mouth Daily.      • traMADol (ULTRAM) 50 MG tablet Take 50 mg by mouth 2 (Two) Times a Day.      , Scheduled Meds:  clindamycin, 1 applicator, Vaginal, Nightly  clobetasol, , Topical, Q12H  Diclofenac Sodium, 2 g, Topical, BID  gabapentin, 300 mg, Oral, TID  ipratropium, 2 spray, Each Nare, TID  ipratropium-albuterol, 3 mL, Nebulization, 4x Daily - RT  lisinopril, 2.5 mg, Oral, Daily  LORazepam, 0.5 mg, Oral, BID  pantoprazole, 40 mg, Oral, Q AM  piperacillin-tazobactam, 3.375 g, Intravenous, Q12H  rOPINIRole, 0.25 mg, Oral, Nightly  sodium chloride, 10 mL, Intravenous, Q12H  sucralfate, 1 g, Oral, BID AC  traMADol, 50 mg, Oral, BID    , Continuous Infusions:  dextrose 5 % and sodium chloride 0.45 %, 150 mL/hr  dextrose 5 % and sodium chloride 0.45 % with KCl 20 mEq/L, 150 mL/hr  dextrose 5 % and sodium chloride 0.45 % with KCl 40 mEq/L, 150 mL/hr  dextrose 5 % and sodium chloride 0.9 %, 150 mL/hr  dextrose 5 % and sodium chloride 0.9 % with KCl 20 mEq, 150 mL/hr  dextrose 5% and sodium chloride 0.9% with KCl 40 mEq/L, 150 mL/hr  DOPamine, 2-20 mcg/kg/min, Last Rate: 16 mcg/kg/min (03/23/23 0929)  insulin, 0-100 Units/hr  Pharmacy to Dose Zosyn,    potassium chloride, 10 mEq  sodium chloride, 250 mL/hr, Last Rate: 250 mL/hr (03/23/23 0944)  sodium chloride 0.45 % with KCl 20 mEq, 250 mL/hr  sodium chloride, 1,000 mL/hr  sodium chloride, 250 mL/hr  sodium chloride 0.9 % with KCl 20 mEq, 250 mL/hr  sodium chloride 0.9 % with KCl 40 mEq/L, 250 mL/hr    , PRN Meds:  •  albuterol  •  benzonatate  •  dextrose  •  dextrose  •  dextrose 5 % and sodium chloride 0.45 %  •  dextrose 5 % and sodium chloride 0.45 % with KCl 20 mEq/L  •  dextrose 5 % and sodium chloride 0.45 % with KCl 40 mEq/L  •  dextrose 5 % and sodium chloride 0.9 %  •  dextrose 5 % and sodium chloride 0.9 % with KCl 20 mEq  •  dextrose 5% and sodium chloride 0.9% with KCl 40 mEq/L  •  glucagon (human recombinant)  •  ipratropium-albuterol  •  ondansetron  •  Pharmacy to Dose Zosyn  •  potassium chloride  •  sodium chloride  •  sodium chloride 0.45 % with KCl 20 mEq  •  sodium chloride  •  sodium chloride  •  sodium chloride  •  sodium chloride  •  sodium chloride 0.9 % with KCl 20 mEq  •  sodium chloride 0.9 % with KCl 40 mEq/L and Allergies:  Ciprofloxacin    Objective     Vital Signs  Temp:  [97.9 °F (36.6 °C)-99.4 °F (37.4 °C)] 98.3 °F (36.8 °C)  Heart Rate:  [37-82] 66  Resp:  [15-20] 20  BP: ()/(31-97) 108/48    No intake/output data recorded.  I/O last 3 completed shifts:  In: 1100 [IV Piggyback:1100]  Out: 27 [Urine:27]    Physical Exam  Constitutional:       Appearance: Normal appearance.   HENT:      Mouth/Throat:      Mouth: Mucous membranes are moist.   Eyes:      General: No scleral icterus.     Pupils: Pupils are equal, round, and reactive to light.   Cardiovascular:      Rate and Rhythm: Normal rate and regular rhythm.   Pulmonary:      Effort: Pulmonary effort is normal.      Breath sounds: Normal breath sounds.   Abdominal:      General: Abdomen is flat.      Palpations: Abdomen is soft.   Skin:     General: Skin is warm and dry.   Neurological:      General: No focal deficit  present.      Mental Status: She is alert.         Results Review:    I reviewed the patient's new clinical results.    WBC WBC   Date Value Ref Range Status   03/23/2023 19.03 (H) 3.40 - 10.80 10*3/mm3 Final   03/22/2023 11.68 (H) 3.40 - 10.80 10*3/mm3 Final      HGB Hemoglobin   Date Value Ref Range Status   03/23/2023 12.6 12.0 - 15.9 g/dL Final   03/22/2023 12.9 12.0 - 15.9 g/dL Final      HCT Hematocrit   Date Value Ref Range Status   03/23/2023 39.6 34.0 - 46.6 % Final   03/22/2023 41.3 34.0 - 46.6 % Final      Platlets No results found for: LABPLAT   MCV MCV   Date Value Ref Range Status   03/23/2023 91.2 79.0 - 97.0 fL Final   03/22/2023 93.2 79.0 - 97.0 fL Final          Sodium Sodium   Date Value Ref Range Status   03/23/2023 133 (L) 136 - 145 mmol/L Final   03/23/2023 128 (L) 136 - 145 mmol/L Final   03/22/2023 127 (L) 136 - 145 mmol/L Final      Potassium Potassium   Date Value Ref Range Status   03/23/2023 4.3 3.5 - 5.2 mmol/L Final   03/23/2023 4.4 3.5 - 5.2 mmol/L Final   03/22/2023 5.8 (H) 3.5 - 5.2 mmol/L Final     Comment:     Slight hemolysis detected by analyzer. Results may be affected.      Chloride Chloride   Date Value Ref Range Status   03/23/2023 95 (L) 98 - 107 mmol/L Final   03/23/2023 93 (L) 98 - 107 mmol/L Final   03/22/2023 93 (L) 98 - 107 mmol/L Final      CO2 CO2   Date Value Ref Range Status   03/23/2023 17.3 (L) 22.0 - 29.0 mmol/L Final   03/23/2023 15.2 (L) 22.0 - 29.0 mmol/L Final   03/22/2023 19.5 (L) 22.0 - 29.0 mmol/L Final      BUN BUN   Date Value Ref Range Status   03/23/2023 67 (H) 8 - 23 mg/dL Final   03/23/2023 68 (H) 8 - 23 mg/dL Final   03/22/2023 64 (H) 8 - 23 mg/dL Final      Creatinine Creatinine   Date Value Ref Range Status   03/23/2023 3.52 (H) 0.57 - 1.00 mg/dL Final   03/23/2023 3.56 (H) 0.57 - 1.00 mg/dL Final   03/22/2023 3.46 (H) 0.57 - 1.00 mg/dL Final      Calcium Calcium   Date Value Ref Range Status   03/23/2023 8.9 8.2 - 9.6 mg/dL Final   03/23/2023 9.1  8.2 - 9.6 mg/dL Final   03/22/2023 9.0 8.2 - 9.6 mg/dL Final      PO4 No results found for: CAPO4   Albumin Albumin   Date Value Ref Range Status   03/22/2023 3.6 3.5 - 5.2 g/dL Final      Magnesium Magnesium   Date Value Ref Range Status   03/23/2023 1.7 1.7 - 2.3 mg/dL Final   03/23/2023 1.7 1.7 - 2.3 mg/dL Final   03/22/2023 2.0 1.7 - 2.3 mg/dL Final      Uric Acid No results found for: URICACID         Assessment & Plan       Bradycardia    Hyperkalemia      Assessment & Plan  RANDY/CKD3B-   She appears to have RANDY related to hypotension and possible ATN.  CKD related to HTN/DM.  Had recently been on reduced dose lisinopril at 10mg/day.  Also on lasix.  Had CT with small left kidney likely chronic ischemia.  Creatinine had been at 2.2 with most recent appt but appears variable from 1.6-1.8 previously.  Would continue supportive care.  Discussed with her and family and would not want dialysis at anytime.  Will continue medical management.  Hyponatremia-  Had noted volume overload, hypotensive at this time.  Slowly better.  Hyperkalemia-  Improved now.  DMII-  Poorly controlled with neuropathy/retionpathy.  Would avoid neurontin with RANDY.  May be able to start low dose.  Bradycardia-  Resolved now.  Hypotension-  On dopamine.  CHF-  Had noted small right pleural effusion on CT.  Has h/o lung cancer adenocarcinoma not treated per pt request.  Met acidosis-  Elevated lactate due to hypotension.  Poss dka, on insulin gtt.  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  Given pain and hypotension possible leak, checking USN.  Family would not want surgical repair, may be palliative care if this is case.    I discussed the patients findings and my recommendations with patient and nursing staff    Connor Mena MD  03/23/23  11:17 EDT

## 2023-03-24 NOTE — PROGRESS NOTES
[facility]     Progress Note    Patient Name: Laury Palacios  : 1930  MRN: [id number]  Primary Care Physician:  Doni Antunez MD  Date of admission: 3/22/2023    Subjective   Subjective       Chief Complaint:   Patient is critically ill in ICU with bradycardia, shock and renal failure    Over last 24 hours,   Unable to tolerate PO intake, when attempted with nausea/vomiting  Initially on insulin gtt per DKA protocol yesterday, transitioned to insulin gtt without fluid exchange  Required Lasix IV, output after is 595 over the past 24 hours  Remains on Dopamine gtt due to bradycardia  Continues on Vaso due to hypotension    Overnight, no acute events.      This morning,  In bed with HOB elevated  3LNC with O2 sat 93%  With labored breathing sitting upright in bed  Family at bedside  HR in 50-60's on Dopamine  On Vaso .04    Review of Systems   Unable to obtain currently    Objective   Objective     Vitals:   Temp:  [97.4 °F (36.3 °C)-99.3 °F (37.4 °C)] 97.4 °F (36.3 °C)  Heart Rate:  [] 60  Resp:  [21-30] 21  BP: ()/(47-99) 111/64  Flow (L/min):  [3-6] 3  Physical Exam   Vital Signs Reviewed  \Vital Signs Reviewed  General frail ill-appearing female  Chest: Scattered crackles and expiratory wheezes does appear to be air hungry  CV: RRR, no MGR, .  EXT:  no clubbing, no cyanosis, no edema, no joint tenderness  Neuro: Somnolent and confused  Skin: No rashes or lesions noted    Result Review    Result Review:  I have personally reviewed the results from the time of this admission to 3/24/2023 14:23 EDT and agree with these findings:  [x]  Laboratory  []  Microbiology  []  Radiology  []  EKG/Telemetry   []  Cardiology/Vascular   []  Pathology  []  Old records  []  Other:  Most notable findings include:       Lab 23  1159 23  0836 23  0428 23  2053 23  2000 23  1822 23  1558 23  1158 23  0807 23  0455 23  0329 23  1807   WBC   --  21.11*  --   --   --  19.70*  --   --   --  19.03*  --  11.68*   HEMOGLOBIN  --  12.8  --   --   --  12.3  --   --   --  12.6  --  12.9   HEMATOCRIT  --  38.7  --   --   --  38.4  --   --   --  39.6  --  41.3   PLATELETS  --  190  --   --   --  229  --   --   --  243  --  213   SODIUM 125* 126* 125*  125* 124* 129* 129* 131* 129* 133*  --  128* 127*   POTASSIUM 4.9 5.1 5.1  5.1 4.5 4.5 4.4 4.5 4.1 4.3  --  4.4 5.8*   CHLORIDE 91* 92* 91*  91* 92* 95* 95* 95* 94* 95*  --  93* 93*   CO2 21.2* 17.9* 18.4*  18.4* 16.7* 17.3* 16.2* 17.0* 16.8* 17.3*  --  15.2* 19.5*   BUN 68* 70* 68*  68* 65* 65* 66* 67* 67* 67*  --  68* 64*   CREATININE 3.27* 3.26* 3.41*  3.41* 3.47* 3.54* 3.61* 3.61* 3.51* 3.52*  --  3.56* 3.46*   GLUCOSE 372* 168* 149*  149* 257* 81 135* 169* 343* 468*  --  505* 317*   CALCIUM 8.1* 8.9 8.8  8.8 8.4 8.9 8.9 8.9 8.7 8.9  --  9.1 9.0   PHOSPHORUS  --  5.8* 5.2*  --  4.8*  --  4.4 4.8* 5.4*  --  5.8*  --    TOTAL PROTEIN  --   --  5.9*  --   --  6.1  --   --   --   --   --  5.8*   ALBUMIN  --   --  3.4*  --   --  3.4*  --   --   --   --   --  3.6   GLOBULIN  --   --  2.5  --   --  2.7  --   --   --   --   --  2.2         Assessment & Plan   Assessment / Plan         Active Hospital Problems:  Active Hospital Problems    Diagnosis    • **Bradycardia    • Hyperkalemia    Cardiogenic shock  ?  Septic shock  RANDY on chronic kidney failure  Diabetes  Abdominal pain  Anion gap metabolic acidosis  Hyponatremia  Lactic acidosis  Lung cancer  Diabetic ketoacidosis     Plan:      Continues on dopamine for bradycardia/hypotension  Continue Vasopressin for hypotension  Levophed added if needed to maintain MAP 65 or greater  Nephrology on board.  Appreciate their assistance  Cardiology on board.  Continue Zosyn, urine culture pending.    Blood cultures are NGTD  Ultrasound patient's abdomen was without rupture noted from aorta  Continues on insulin gtt   Remains with poor urinary output  Palliative care on  board.  Patient is with overall poor prognosis.  Have discussed with family at bedside proceeding forward with comfort care.  Family is in agreement with this plan.  Palliative care will be meeting with the patient family  Will put in palliative care orders if indeed they decide to withdraw       DVT prophylaxis:  Mechanical DVT prophylaxis orders are present.    CODE STATUS:   Medical Intervention Limits: NO intubation (DNI); Other  Level Of Support Discussed With: Patient  Code Status (Patient has no pulse and is not breathing): No CPR (Do Not Attempt to Resuscitate)  Medical Interventions (Patient has pulse or is breathing): Limited Support  Comments: dnr  Additional Medical Interventions Limits: dnr      Patient is critically ill with cardiogenic shock, concern for septic shock, RANDY on CKD, diabetes, abdominal pain, anion gap metabolic acidosis. We have personally reviewed all pertinent labs, imaging, microbiology and documentation. We have discussed care with the primary service as well as at multidisciplinary critical care rounds with the bedside nurse, respiratory therapist, pharmacist and all other ancillary services.40minutes of critical care time was spent managing this patient, excluding procedures. Of this time, I [id number] and NADER Bauman [id number] in accordance with split shared billing.      Electronically signed by NADER Caal, 03/24/23, 2:23 PM EDT.      Electronically signed by Glynn Mejia DO, 03/24/23, 6:21 PM EDT.

## 2023-03-24 NOTE — PLAN OF CARE
Goal Outcome Evaluation:                Outcome Evaluation: Dopamine titrated down to 8, vasopressin and insuling gtt in place. Pt complaint of sternal/epigastric/CP; EKG, CXR, labs drawn and MD notified. Schduled and PRN medicaiton given; pain improved but still present. PRN zofran given for nausea, pt belching several times may benifit from simethicone. Patient complaint of severe abd pain, MD notified - one time dose dilaudid given, pain improved and under control.

## 2023-03-24 NOTE — PROGRESS NOTES
[facility]     Nephrology Progress Note      Patient Name: Laury Palacios  : 1930  MRN: [id number]  Primary Care Physician:  Doni Antunez MD  Date of admission: 3/22/2023    Subjective   Subjective     Interval History:  Patient was seen earlier this morning.  Not feeling well.  Throwing up and having abdominal discomfort.  Blood pressure is marginally low, little urine output.  On vasopressin and dopamine.  Family at bedside.    Review of Systems   All systems were reviewed and negative except for: What is mentioned above.    Objective   Objective     Vitals:   Temp:  [97.4 °F (36.3 °C)-99.3 °F (37.4 °C)] 97.4 °F (36.3 °C)  Heart Rate:  [] 60  Resp:  [21-30] 21  BP: ()/(47-99) 111/64  Flow (L/min):  [3-6] 3  Physical Exam:   Constitutional: Awake, alert , Not feeling well.   Eyes: sclerae anicteric, no conjunctival injection, pale   HENT: mucous membranes moist   Neck: Supple, no thyromegaly, no lymphadenopathy, trachea midline, mild JVD   Respiratory: Clear to auscultation bilaterally, nonlabored respirations    Cardiovascular: RRR, no murmurs, rubs, or gallops.   Gastrointestinal: Positive bowel sounds, soft, epigastric tenderness, nondistended   Musculoskeletal: No edema, no clubbing or cyanosis   Psychiatric: Appropriate affect, cooperative   Neurologic: Oriented x 3, moving all extremities, Cranial Nerves grossly intact, speech mumbled.    Skin: warm and dry, no rashes    Miranda in place.  Result Review    Result Reviewed:  I have personally reviewed the results from the time of this admission to 3/24/2023 15:04 EDT and agree with these findings:  [x]  Laboratory  []  Microbiology  [x]  Radiology  []  EKG/Telemetry   []  Cardiology/Vascular   []  Pathology  []  Old records  []  Other:  Lab Results   Component Value Date    GLUCOSE 372 (H) 2023    CALCIUM 8.1 (L) 2023     (L) 2023    K 4.9 2023    CO2 21.2 (L) 2023    CL 91 (L) 2023    BUN 68  (H) 03/24/2023    CREATININE 3.27 (H) 03/24/2023    EGFRIFNONA 33 (L) 12/23/2021    BCR 20.8 03/24/2023    ANIONGAP 12.8 03/24/2023     Lab Results   Component Value Date    CALCIUM 8.1 (L) 03/24/2023    PHOS 5.8 (H) 03/24/2023      Results from last 7 days   Lab Units 03/24/23  0836   MAGNESIUM mg/dL 2.3          Most notable findings include: Creatinine 3.4, potassium 5.1, sodium 125, BNP markedly elevated.  2D echo showed hyperdynamic LV, EF 70%, LVH and diastolic dysfunction.    Assessment & Plan   Assessment / Plan       Active Hospital Problems:  Active Hospital Problems    Diagnosis    • **Bradycardia    • Hyperkalemia        Assessment and Plan:    RANDY/CKD3B-   She appears to have RANDY related to hypotension and possible ATN.  CKD related to HTN/DM.    Lisinopril on hold, on pressors.  Known atrophic left kidney.  Creatinine unchanged from yesterday, still hypotensive on pressors today.  We will start IV fluid and monitor closely.  D5 half-normal saline with 75 mEq of sodium bicarb at 100 cc/h.  Check random cortisol level.  Between 1.8 and 2.2.  Office records reviewed.  Discussed with her and family and would not want dialysis at anytime.  Will continue medical management.  Hyponatremia- volume status is difficult to assess, I suspect she has volume excess but she is quite hypotensive.  Status post Lasix yesterday.  Giving IV fluid as above now. Check TSH/FT4. Check cortisol.   Hyperkalemia: improved now.  DMII-  Poorly controlled with neuropathy/retionpathy.  Would avoid neurontin with RANDY.  May be able to start low dose.  Bradycardia- better now. Hr was 56 on monitor.   Hypotension-  On dopamine and vasopressin.   CHF- diastolic dysfunction with hyperdynamic LV. Cardiology following.   h/o lung cancer adenocarcinoma not treated per pt request.  Met acidosis-  Elevated lactate due to hypotension. Less likely DKA. addion bicarb to ivf. On inulin.   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 US unable to see  aorta. Will check CT scan when able to do. Check lipase.   D/w family. Prognosis is guarded.   Will follow.        Electronically signed by Estuardo Jefferson MD, 03/24/23, 3:04 PM EDT.

## 2023-03-24 NOTE — PROGRESS NOTES
[facility]     Progress Note    Patient Name: Laury Palacios  : 1930  MRN: [id number]  Primary Care Physician:  Doni Antunez MD  Date of admission: 3/22/2023    Subjective   Subjective     Chief Complaint: Discussed with the cardiologist whole family doctor morning thinks that patient is probably dehydrated and needs more fluid I have discussed the case with Dr. Lee with nephrology and will see if we can give some IV fluids also probably some Lasix as well patient otherwise comfortable doing well not in acute distress still has bradycardia and cardiology would like to wait for for 5 days to get the effect of beta-blockers out of the way before decide about what next to do patient alert oriented stable    HPI: Patient with sudden onset of bradycardia and hypotension continues to be on dopamine and nephrology to decide about the fluid patient has some epigastric discomfort    Review of Systems   All systems were reviewed and negative except for: Reviewed    Objective   Objective     Vitals:   Temp:  [97.4 °F (36.3 °C)-99.3 °F (37.4 °C)] 97.5 °F (36.4 °C)  Heart Rate:  [] 69  Resp:  [22-30] 24  BP: ()/(42-94) 105/53  Flow (L/min):  [3-6] 3    Physical Exam    Constitutional: Awake, alert   Eyes: PERRLA, sclerae anicteric, no conjunctival injection   HENT: NCAT, mucous membranes moist   Neck: Supple, no thyromegaly, no lymphadenopathy, trachea midline   Respiratory: Clear to auscultation bilaterally, nonlabored respirations    Cardiovascular: RRR, no murmurs, rubs, or gallops, palpable pedal pulses bilaterally   Gastrointestinal: Positive bowel sounds, soft, nontender, nondistended   Musculoskeletal: No bilateral ankle edema, no clubbing or cyanosis to extremities   Psychiatric: Appropriate affect, cooperative   Neurologic: Oriented x 3, strength symmetric in all extremities, Cranial Nerves grossly intact to confrontation, speech clear   Skin: No rashes   No change  Result Review    Result  Review:  I have personally reviewed the results from the time of this admission to 3/24/2023 09:10 EDT and agree with these findings:  [x]  Laboratory renal failure  []  Microbiology  []  Radiology  []  EKG/Telemetry   []  Cardiology/Vascular   []  Pathology  []  Old records  []  Other:  Most notable findings include: Renal failure    Assessment & Plan   Assessment / Plan     Brief Patient Summary:  Laury Palacios is a 89 y.o. female who patient with history of lung cancer and renal diabetes mellitus    Active Hospital Problems:  Active Hospital Problems    Diagnosis    • **Bradycardia    • Hyperkalemia        Plan:   Continue the current management    DVT prophylaxis:  Mechanical DVT prophylaxis orders are present.    CODE STATUS:   Medical Intervention Limits: NO intubation (DNI); Other  Level Of Support Discussed With: Patient  Code Status (Patient has no pulse and is not breathing): No CPR (Do Not Attempt to Resuscitate)  Medical Interventions (Patient has pulse or is breathing): Limited Support  Comments: dnr  Additional Medical Interventions Limits: dnr    Disposition:  I expect patient to be discharged after the patient has been stabilized.    Electronically signed by Doni Antunez MD, 03/24/23, 9:10 AM EDT.      Part of this note may be an electronic transcription/translation of spoken language to printed text using the Dragon Dictation System.

## 2023-03-24 NOTE — PROGRESS NOTES
[facility]     Cardiology Progress Note    Patient Name: Laury Palacios  : 1930  MRN: [id number]  Primary Care Physician:  Doni Antunez MD  Date of admission: 3/22/2023    Subjective   Subjective   Chief complaint  Bradycardia    HPI:  Patient Reports some continued abdominal pain.    Review of Systems   All systems were reviewed and negative except for: Cardiac review of systems negative.    Objective   Objective     Vitals:   Temp:  [97.4 °F (36.3 °C)-99.3 °F (37.4 °C)] 97.5 °F (36.4 °C)  Heart Rate:  [] 69  Resp:  [22-30] 24  BP: ()/(42-94) 105/53  Flow (L/min):  [3-6] 3  Physical Exam   Neck: no JVD, no bruit  Lungs: clear to ausculation bilaterally.  No crackles or wheezes  CV: rrr, no murur  Ext: no c/c/edema.  Normal bilateral LE pulses.      Scheduled Meds:clindamycin, 1 applicator, Vaginal, Nightly  clobetasol, , Topical, Q12H  Diclofenac Sodium, 2 g, Topical, BID  ipratropium, 2 spray, Each Nare, TID  LORazepam, 0.5 mg, Oral, BID  pantoprazole, 40 mg, Oral, Q AM  piperacillin-tazobactam, 3.375 g, Intravenous, Q12H  rOPINIRole, 0.25 mg, Oral, Nightly  sodium chloride, 10 mL, Intravenous, Q12H  sucralfate, 1 g, Oral, BID AC  traMADol, 50 mg, Oral, BID      Continuous Infusions:DOPamine, 2-20 mcg/kg/min, Last Rate: 7 mcg/kg/min (23 0633)  insulin, 0-100 Units/hr, Last Rate: 0 Units/hr (23)  Pharmacy to Dose Zosyn,   vasopressin, 0.04 Units/min, Last Rate: 0.04 Units/min (23)           Result Review    Result Review:  I have personally reviewed the results from the time of this admission to 3/24/2023 08:09 EDT and agree with these findings:  [x]  Laboratory  []  Microbiology  [x]  Radiology  [x]  EKG/Telemetry   [x]  Cardiology/Vascular   []  Pathology  []  Old records  []  Other:  Most notable findings include:     CBC    CBC 2/6/23 3/22/23 3/23/23 3/23/23      8299 1822   WBC 6.61 11.68 (A) 19.03 (A) 19.70 (A)   RBC 4.55 4.43 4.34 4.18   Hemoglobin 13.0  12.9 12.6 12.3   Hematocrit 40.5 41.3 39.6 38.4   MCV 89.0 93.2 91.2 91.9   MCH 28.6 29.1 29.0 29.4   MCHC 32.1 31.2 (A) 31.8 32.0   RDW 13.7 13.4 13.1 13.0   Platelets 210 213 243 229   (A) Abnormal value            CMP    CMP 3/22/23 3/23/23 3/23/23 3/23/23 3/23/23 3/23/23 3/23/23 3/23/23 3/24/23 3/24/23     0329 0807 1158 1558 1822 2000 2053 0428 0428   Glucose 317 (A) 505 (A) 468 (A) 343 (A) 169 (A) 135 (A) 81 257 (A) 149 (A) 149 (A)   BUN 64 (A) 68 (A) 67 (A) 67 (A) 67 (A) 66 (A) 65 (A) 65 (A) 68 (A) 68 (A)   Creatinine 3.46 (A) 3.56 (A) 3.52 (A) 3.51 (A) 3.61 (A) 3.61 (A) 3.54 (A) 3.47 (A) 3.41 (A) 3.41 (A)   eGFR 11.9 (A) 11.5 (A) 11.7 (A) 11.7 (A) 11.3 (A) 11.3 (A) 11.6 (A) 11.9 (A) 12.2 (A) 12.2 (A)   Sodium 127 (A) 128 (A) 133 (A) 129 (A) 131 (A) 129 (A) 129 (A) 124 (A) 125 (A) 125 (A)   Potassium 5.8 (A) 4.4 4.3 4.1 4.5 4.4 4.5 4.5 5.1 5.1   Chloride 93 (A) 93 (A) 95 (A) 94 (A) 95 (A) 95 (A) 95 (A) 92 (A) 91 (A) 91 (A)   Calcium 9.0 9.1 8.9 8.7 8.9 8.9 8.9 8.4 8.8 8.8   Total Protein 5.8 (A)     6.1    5.9 (A)   Albumin 3.6     3.4 (A)    3.4 (A)   Globulin 2.2     2.7    2.5   Total Bilirubin 0.2     0.2    0.3   Alkaline Phosphatase 108     95    85   AST (SGOT) 40 (A)     34 (A)    36 (A)   ALT (SGPT) 57 (A)     53 (A)    47 (A)   Albumin/Globulin Ratio 1.6     1.3    1.4   BUN/Creatinine Ratio 18.5 19.1 19.0 19.1 18.6 18.3 18.4 18.7 19.9 19.9   Anion Gap 14.5 19.8 (A) 20.7 (A) 18.2 (A) 19.0 (A) 17.8 (A) 16.7 (A) 15.3 (A) 15.6 (A) 15.6 (A)   (A) Abnormal value       Comments are available for some flowsheets but are not being displayed.            CARDIAC LABS:      Lab 03/24/23  0428 03/23/23  2053 03/23/23  0329 03/22/23  1807   PROBNP  --  31,000.0* 13,991.0*  --    HSTROP T 544* 482*  --  174*        Assessment & Plan   Assessment / Plan     Brief Patient Summary:  Laury Palacios is a 89 y.o. female who has a history of diabetes, hypertension and hyperlipidemia.  The patient is not able to give an  adequate history.  It appears she presented with abdominal pain, black stools and diarrhea.  She was found to be very bradycardic and hypotensive.  She had significant acute on chronic kidney disease.    Active Hospital Problems:  Active Hospital Problems    Diagnosis    • **Bradycardia    • Hyperkalemia        Assessment:  1.  Junctional bradycardia  2.  Mild hyperkalemia  3.  Hypotension  4.  Acute on chronic kidney disease     Plan:   1.  Patient apparently presented with multiple abdominal complaints.  2.  Got a double amplitude EKG yesterday that appears to show junctional bradycardia as in V1, I do see P waves after the QRS.  3.  We are just going to watch as patient's Toprol 50 mg twice daily slowly wears off.  She is currently on a dopamine drip.  We will try to wean this off as tolerated.  We will try to avoid pacemaker if possible.  4.  Patient's potassium was mildly elevated but has completely come back to normal.  I do not think this was significantly affecting her heart rate.  5.  Agree with holding amlodipine and lisinopril.  6.  Patient's beta natruretic peptide is elevated in the setting of acute on chronic kidney disease.  She notes no shortness of breath and appears euvolemic.  I do think we can hydrate her to see if her blood pressure and serum creatinine improve.  Her echocardiogram shows very hyperdynamic LV function and moderate left ventricular hypertrophy.  Would consider normal saline at 75 cc/h.        CODE STATUS:   Medical Intervention Limits: NO intubation (DNI); Other  Level Of Support Discussed With: Patient  Code Status (Patient has no pulse and is not breathing): No CPR (Do Not Attempt to Resuscitate)  Medical Interventions (Patient has pulse or is breathing): Limited Support  Comments: dnr  Additional Medical Interventions Limits: dnr      Electronically signed by Lopez Lopez MD, 03/24/23, 8:09 AM EDT.

## 2023-03-24 NOTE — PLAN OF CARE
Goal Outcome Evaluation:     Progress discussed with: Patient and Family    Progress: ongoing, declining    Outcome: Pt A&O to self, Junctional rhythm, 3L NC SPO2 97%. Family decided on comfort care only, [facility]

## 2023-03-24 NOTE — PLAN OF CARE
Goal Outcome Evaluation:  Plan of Care Reviewed With: daughter        Progress: declining  Outcome Evaluation: Patient transferred from CCU around 1615. PRN Morphine administered x2. Patient  at 1841 this evening. MD notified.

## 2023-03-24 NOTE — CONSULTS
Physical Therapy    Visit Type: treatment  Precautions:  Medical precautions:  fall risk; standard precautions.    1/2/23 - fall at home sustaining Nondisplaced type II odontoid fracture  1/6/23 - office visit with Dr. Villavicencio - instructed to wear cervical collar with all activity / mobility  1/24/23 - phone documentation from Dr. Villavicencio that patient DOES NOT need to wear cervical collar.   Lines:     Basic: capped IV      Lines in chart and on patient reviewed, precautions maintained throughout session.  Safety Measures: chair alarm  SUBJECTIVE  Patient agreed to participate in therapy this date.  Patient verbally agrees to allow the following to be present during session: children (son, daughter-in-law)  RNTiffani, approved session.     Pt sitting up in recliner with lunch tray initially, requesting writer return after meal. Writer returned ~45 minutes later - pt dressed, sitting on couch, family present, RN in room reviewing discharge paperwork. Pt agreeable to brief therapy session at this time to practice stairs (flight of steps to her basement where her canned goods are).     Pt confirms she has a standard walker at home, without wheels. With encouragement, agreeable to 2WW for home discharge (family appreciative).     Patient / Family Goal: return to previous functional status, maximize function and return home    Pain     Location: denies pain   RN informed on pain level     OBJECTIVE     Cognitive Status   Arousal Alertness   - appropriate responses to stimuli  Affect/Behavior    - cooperative  Orientation    - Oriented to: person, place, time and situation  Functional Communication   - Forms of Communication: verbal  Attention Span    - Attention: intact  Following Direction   - follows all commands and directions consistently  Transition Between Tasks   - transitions without difficulty  Memory   - intact    Patient Activity Tolerance: 2 to 1 activity to rest    Posture:  - Seated: fair      Range of  Purpose of the visit was to evaluate for: support for patient/family and hospice referral/discussion. Spoke with RN and family and discussed [facility] and discharge options.      Assessment: Patient is on CCU, on nasal canula, B/P 89/50, currently resting with eyes closed and did not open to voice. On a Dopamine and insulin drip with IV Fluids running. No s/s of pain, nausea or anxiety at this time.    Recommendations/Plan: [facility] referral.    Tasks Completed: Emotional Support.    Other Comments: Palliative care nurse visited with all three daughters at the bedside, Irina, Lisette and Lizbeth. I allowed them time to explain what has been going on with their mother and her goals. The family explained that the patients goal was to get home. I educated that the patient would need to be able to safely wean off the drips first and then if we felt it was safe the patient could then DC. Family report they are willing to take the patient via car once off the drips with the understanding she could pass in route home. I explained the DC process, No DME needed, scripts to be filled at [facility] and DC via personal vehicle. A [facility] referral was placed and they have an appointment today at 3:00 pm with Garrison. All questions were answered and contact info given.     -Farzaneh GONZALEZ, RN, [facility]   Palliative Care    3/24/2023 11:34 EDT     Motion (ROM)   (degrees unless noted; active unless noted; norms in ( ); negative=lacking to 0, positive=beyond 0)  WFL: LLE, RLE    Strength  (out of 5 unless noted, standard test position unless noted)   WFL: LLE, RLE      Sitting Balance  (MARKO = base of support)  Static      - Trial 1 details: with back unsupported and modified independent  Dynamic      - Trial 1 details: with back unsupported and modified independent    Standing Balance  (MARKO = base of support)  Firm Surface: Double Leg      - Static, Eyes Open       - Trial 1 details: supervision     - Dynamic, Eyes Open       - Trial 1 details: with double UE support and stand by assist  2ww utilized       Transfers  Assistive devices: gait belt, 2-wheeled walker  - Sit to stand: with verbal cues, supervision  - Stand to sit: with verbal cues, supervision  Cues for hand placement     Ambulation / Gait  - Assistive device: gait belt and two-wheeled walker  - Distance (feet unless otherwise indicated): 180  - Assist Level: stand by assist  - Surface: even  - Description: decreased sea/pace  Verbal cues for walker proximity and upright posture as tolerated.       Stair Ambulation  - Number of steps: 4  Ascend  - Assist Level: contact guard  - Pattern: step-to  - Railing: left    Cues for technique. At baseline, pt crawls up the basement steps.     Encouraged pt to avoid going to basement at this time as she lives alone and is requiring assist of 1 for safe stair completion. Family present, in agreement, and offered assistance when needed to bring up her canned goods. Pt in agreement.     Descend   - Assist Level: gait belt used and contact guard  - Pattern: step-to  - Railing: right and 2 hands on one rail    Cues for step to pattern and chapin UE support on right rail for balance and stability.    Interventions     Training provided: activity tolerance, balance retraining, body mechanics, functional ambulation, HEP training, safety training, use of assistive  device, transfer training, bed mobility training and stair training        Skilled input: Verbal instruction/cues and tactile instruction/cues  Verbal Consent: Writer verbally educated and received verbal consent for hand placement, positioning of patient, and techniques to be performed today from patient for clothing adjustments for techniques and therapist position for techniques as described above and how they are pertinent to the patient's plan of care.         ASSESSMENT   Impairments: range of motion, strength, activity tolerance, balance deficits, safety awareness and pain  Functional Limitations: all functional mobility    Patient continues to make good progress as demonstrated by less assistance needed for mobility. Patient does have support from family that live nearby. Patient to require further therapy to progress strengthening and mobility in order to maximize functional independence and safety.    Discharge Recommendations  Recommendation for Discharge Location: PT WI: Home with Home therapy  PT/OT ADL Equipment for Discharge: will assess further.    Pain at End of Session: RN informed on pain level  Pain: 0/10, location: denies     Progress: progressing toward goals    • Skilled therapy is required to address these limitations in attempt to maximize the patient's independence.    Education:   - Present and ready to learn: patient and patient's family  Education provided during session:  - Results of above outlined education: Verbalizes understanding, Demonstrates understanding and Needs reinforcement    Patient at End of Session:   Location end of session: on couch.  Handoff to: nurse    PLAN   Suggestions for next session as indicated: Repeated transfers, standing LE therex, progress ambulation (2WW versus cane trial)     PT Frequency: 3-5 x per week  Frequency Comments: X 1/28 - 4 (MR, JE, RS)        Interventions: balance, gait training, neuromuscular re-education, strengthening, bed mobility,  equipment eval/education, patient/family training, safety education, endurance training, functional transfer training, stairs retraining and wheelchair mobility  Agreement to plan and goals: patient agrees with goals and treatment plan        GOALS  Review Date: 2/2/2023  Long Term Goals: (to be met by time of discharge from hospital)  Sit to supine: Patient will complete sit to supine modified independent.  Status: progressing/ongoing  Supine to sit: Patient will complete supine to sit modified independent.  Status: progressing/ongoing  Sit to stand: Patient will complete sit to stand transfer with least restrictive device, modified independent.   Status: progressing/ongoing  Stand to sit: Patient will complete stand to sit transfer with least restrictive device, modified independent.   Status: progressing/ongoing  Ambulation (even): Patient will ambulate on even surface for 300 feet with least restrictive device, modified independent.   Status: progressing/ongoing  Flight of stairs: Patient will ambulate a flight of stairs with least restrictive device modified independent.  Status: progressing/ongoing  Home program: patient independent with home program as instructed.   Status: progressing/ongoing    Documented in the chart in the following areas: Assessment.      Therapy procedure time and total treatment time can be found documented on the Time Entry flowsheet

## 2023-03-25 LAB — QT INTERVAL: 404 MS

## 2023-03-26 LAB
QT INTERVAL: 390 MS
QT INTERVAL: 402 MS
QT INTERVAL: 545 MS

## 2023-03-27 LAB
BACTERIA SPEC AEROBE CULT: NORMAL
BACTERIA SPEC AEROBE CULT: NORMAL

## 2023-03-28 RX ORDER — HEPARIN SODIUM 10000 [USP'U]/ML
60 INJECTION, SOLUTION INTRAVENOUS; SUBCUTANEOUS ONCE
Status: COMPLETED | OUTPATIENT
Start: 2023-01-01 | End: 2023-01-01

## 2023-03-28 NOTE — DISCHARGE SUMMARY
[facility]         DISCHARGE SUMMARY    Patient Name: Laury Palacios  : 1930  MRN: [id number]    Date of Admission: 3/22/2023  Date of Discharge: 3/24/2023  Primary Care Physician: Doni Antunez MD    Consults     Date and Time Order Name Status Description    3/23/2023  7:08 AM Inpatient Intensivist Consult Completed           Presenting Problem:   Hyperkalemia [E87.5]  Bradycardia [R00.1]    Active and Resolved Hospital Problems:  Active Hospital Problems    Diagnosis POA   • **Bradycardia [R00.1] Yes   • Hyperkalemia [E87.5] Yes      Resolved Hospital Problems   No resolved problems to display.         Hospital Course     Hospital Course:  Laury Palacios is a 89 y.o. female patient was admitted hypotensive unresponsive state bradycardia in the emergency room atropine was given patient was referred to cardiology and was started on dopamine infusion blood pressure stabilized was transfused with a unit but according to cardiology patient was on fluid and therefore fluid was planning to be given but patient was in acute renal failure she had had chronic kidney disease stage V because of diabetes had been under care of Dr. Lee she started getting worse her family had decided not to do any dialysis or do any cardiac procedures she was made DNR General condition kept on getting worse she had been complaining of chest pain as well was controlled with medications her general condition kept on declining 89 years old with metastatic lung cancer diabetes and end-stage kidney disease it was decided to make her comfort care patient was transferred out of the unit to the regular floor for comfort med management but she had acute cardiorespiratory arrest and       DISCHARGE Follow Up Recommendations for labs and diagnostics: Acute cardiorespiratory distress state with kidney failure lung cancer 3 of asthma hypertension      Pertinent  and/or Most Recent Results     LAB RESULTS:      Lab  03/24/23  1159 03/24/23 0836 03/23/23 2000 03/23/23 1822 03/23/23  1558 03/23/23  1158 03/23/23  0808 03/23/23 0455 03/22/23 2117 03/22/23  1807   WBC  --  21.11*  --  19.70*  --   --   --  19.03*  --  11.68*   HEMOGLOBIN  --  12.8  --  12.3  --   --   --  12.6  --  12.9   HEMATOCRIT  --  38.7  --  38.4  --   --   --  39.6  --  41.3   PLATELETS  --  190  --  229  --   --   --  243  --  213   NEUTROS ABS  --  18.30*  --   --   --   --   --  16.66*  --  9.11*   IMMATURE GRANS (ABS)  --  0.14*  --   --   --   --   --  0.11*  --  0.05   LYMPHS ABS  --  1.26  --   --   --   --   --  0.64*  --  1.77   MONOS ABS  --  1.33*  --   --   --   --   --  1.59*  --  0.71   EOS ABS  --  0.04  --   --   --   --   --  0.00  --  0.01   MCV  --  89.6  --  91.9  --   --   --  91.2  --  93.2   PROCALCITONIN  --   --   --   --   --  3.32*  --   --   --   --    LACTATE 1.6 2.3* <0.2*  --  4.1* 5.8*   < >  --    < > 3.6*   PROTIME  --  13.6  --   --   --   --   --   --   --   --     < > = values in this interval not displayed.         Lab 03/24/23  1159 03/24/23 0836 03/24/23 0428 03/23/23 2053 03/23/23 2000 03/23/23 1822 03/23/23  1558 03/23/23  1158   SODIUM 125* 126* 125*  125* 124* 129*   < > 131* 129*   POTASSIUM 4.9 5.1 5.1  5.1 4.5 4.5   < > 4.5 4.1   CHLORIDE 91* 92* 91*  91* 92* 95*   < > 95* 94*   CO2 21.2* 17.9* 18.4*  18.4* 16.7* 17.3*   < > 17.0* 16.8*   ANION GAP 12.8 16.1* 15.6*  15.6* 15.3* 16.7*   < > 19.0* 18.2*   BUN 68* 70* 68*  68* 65* 65*   < > 67* 67*   CREATININE 3.27* 3.26* 3.41*  3.41* 3.47* 3.54*   < > 3.61* 3.51*   EGFR 12.8* 12.8* 12.2*  12.2* 11.9* 11.6*   < > 11.3* 11.7*   GLUCOSE 372* 168* 149*  149* 257* 81   < > 169* 343*   CALCIUM 8.1* 8.9 8.8  8.8 8.4 8.9   < > 8.9 8.7   MAGNESIUM  --  2.3 2.1 3.6* 2.2  --  1.8 1.7   PHOSPHORUS  --  5.8* 5.2*  --  4.8*  --  4.4 4.8*    < > = values in this interval not displayed.         Lab 03/24/23  0836 03/24/23  0428 03/23/23  1822 03/22/23  1807    TOTAL PROTEIN  --  5.9* 6.1 5.8*   ALBUMIN  --  3.4* 3.4* 3.6   GLOBULIN  --  2.5 2.7 2.2   ALT (SGPT)  --  47* 53* 57*   AST (SGOT)  --  36* 34* 40*   BILIRUBIN  --  0.3 0.2 0.2   ALK PHOS  --  85 95 108   LIPASE 6*  --   --   --          Lab 03/24/23  0836 03/24/23  0428 03/23/23 2053 03/23/23 0329 03/22/23  1807   PROBNP  --   --  31,000.0* 13,991.0*  --    HSTROP T 557* 544* 482*  --  174*   PROTIME 13.6  --   --   --   --    INR 1.03  --   --   --   --              Lab 03/22/23 2117 03/22/23 1807   ABO TYPING O O   RH TYPING Negative Negative   ANTIBODY SCREEN  --  Negative         Brief Urine Lab Results  (Last result in the past 365 days)      Color   Clarity   Blood   Leuk Est   Nitrite   Protein   CREAT   Urine HCG        03/23/23 0829 Yellow   Clear   Trace   Moderate (2+)   Negative   100 mg/dL (2+)               Microbiology Results (last 10 days)     Procedure Component Value - Date/Time    Urine Culture - Urine, Indwelling Urethral Catheter [[id number]]  (Abnormal) Collected: 03/23/23 0829    Lab Status: Final result Specimen: Urine from Indwelling Urethral Catheter Updated: 03/24/23 1424     Urine Culture Yeast isolated     Comment: No further workup.         Narrative:      Colonization of the urinary tract without infection is common. Treatment is discouraged unless the patient is symptomatic, pregnant, or undergoing an invasive urologic procedure.    Blood Culture - Blood, Arm, Left [[id number]]  (Normal) Collected: 03/22/23 2314    Lab Status: Final result Specimen: Blood from Arm, Left Updated: 03/27/23 2330     Blood Culture No growth at 5 days    Blood Culture - Blood, Arm, Left [[id number]]  (Normal) Collected: 03/22/23 2314    Lab Status: Final result Specimen: Blood from Arm, Left Updated: 03/27/23 2330     Blood Culture No growth at 5 days          CT Abdomen Pelvis Without Contrast    Result Date: 3/22/2023  Impression:   CT scan of the abdomen and pelvis without contrast demonstrating  small right pleural effusion with subsegmental atelectasis and/or linear fibrosis at the lung bases.  Post cholecystectomy.  Small left kidney consistent with chronic ischemia.  1 cm hyperdense lesion in the lower pole left kidney is stable.  3.5 cm fusiform infrarenal abdominal aortic aneurysm, previously 3.3 cm.  2.6 cm x 1.5 cm saccular outpouching arising from the anterior inferior aorta also appears larger.  Moderate diverticulosis of the descending colon and sigmoid colon.  Ill-defined fluid collection in the left upper and mid abdomen.  Inflammatory changes are seen in abdominal fat.  Evaluation of abdominal organs and bowel is limited without contrast.      KEATON JIMENEZ MD       Electronically Signed and Approved By: KEATON JIMENEZ MD on 3/22/2023 at 21:10             XR Chest 1 View    Result Date: 3/23/2023  Impression:   1. Worsening progression is suggested radiographically with new or greater opacification of the right thorax, especially in its basilar portion, since 3/22/2023 at 2033 hours.  The findings suggest greater atelectasis and possible postobstructive pneumonia.  2. A small-to-moderate right pleural effusion is possible.  Minimal, if any, left pleural effusion is suggested.  3. The patient is slightly rotated to the right.  4. There may be borderline cardiac enlargement.  5. No definite pneumothorax is seen.  6. Minimal, if any, acute airspace disease is seen on the left.  7. The thoracic aorta is prominent, atherosclerotic and ectatic. 8. The other findings are as detailed above.     Please note that portions of this note were completed with a voice recognition program.  CHRIS WREN JR, MD       Electronically Signed and Approved By: CHRIS WREN JR, MD on 3/23/2023 at 22:18             XR Chest 1 View    Result Date: 3/22/2023  Impression:  No active disease is seen.       KEATON JIMENEZ MD       Electronically Signed and Approved By: KEATON JIMENEZ MD on 3/22/2023 at 19:21                Results for orders placed during the hospital encounter of 12/15/21    Duplex Venous Lower Extremity - Bilateral CV-READ    Interpretation Summary  · Normal bilateral lower extremity venous duplex scan.  · There is mild bilateral deep venous insufficiency.      Results for orders placed during the hospital encounter of 12/15/21    Duplex Venous Lower Extremity - Bilateral CV-READ    Interpretation Summary  · Normal bilateral lower extremity venous duplex scan.  · There is mild bilateral deep venous insufficiency.      Results for orders placed during the hospital encounter of 03/22/23    Adult Transthoracic Echo Complete W/ Cont if Necessary Per Protocol    Interpretation Summary  •  Left ventricular systolic function is hyperdynamic (EF > 70%). Left ventricular ejection fraction appears to be greater than 70%.  •  Left ventricular wall thickness is consistent with moderate concentric hypertrophy.  •  Left ventricular diastolic function is consistent with (grade I) impaired relaxation and age.  •  Moderate tricuspid valve regurgitation is present.  Estimated RVSP of 52 mmHg.  •  Technically difficult study.      Labs Pending at Discharge:        Discharge Details        Discharge Medications      ASK your doctor about these medications      Instructions Start Date   albuterol sulfate  (90 Base) MCG/ACT inhaler  Commonly known as: PROVENTIL HFA;VENTOLIN HFA;PROAIR HFA   2 puffs, Inhalation, Every 4 Hours PRN      calcium-vitamin D 500-200 MG-UNIT per tablet  Commonly known as: OSCAL-500   1 tablet, Oral, 2 Times Daily      cephalexin 250 MG capsule  Commonly known as: KEFLEX  Ask about: Which instructions should I use?   250 mg, Oral, Daily      clindamycin 2 % vaginal cream  Commonly known as: CLEOCIN   May use large pea size vaginally every night as needed for vaginal infection      clobetasol 0.05 % external solution  Commonly known as: TEMOVATE   Apply 1 application topically to the appropriate area  as directed 2 (Two) Times a Day As Needed.      Diclofenac Sodium 1 % gel gel  Commonly known as: VOLTAREN   4 g, Topical, 2 Times Daily PRN      ergocalciferol 1.25 MG (24223 UT) capsule  Commonly known as: ERGOCALCIFEROL   50,000 Units, Oral, Weekly      folic acid 1 MG tablet  Commonly known as: FOLVITE   1 mg, Oral, Daily      furosemide 40 MG tablet  Commonly known as: LASIX   40 mg, Oral, Daily PRN      gabapentin 300 MG capsule  Commonly known as: NEURONTIN   300 mg, Oral, 3 Times Daily      hydrocortisone 2.5 % cream   Apply 1 application topically to the appropriate area as directed 4 (Four) Times a Day.      insulin aspart 100 UNIT/ML solution pen-injector sc pen  Commonly known as: novoLOG FLEXPEN   2-6 Units, Subcutaneous, 2 Times Daily      insulin detemir 100 UNIT/ML injection  Commonly known as: LEVEMIR   2-3 Units, Subcutaneous, Nightly      ipratropium 0.03 % nasal spray  Commonly known as: ATROVENT   2 sprays, Nasal, 3 Times Daily      ipratropium-albuterol 0.5-2.5 mg/3 ml nebulizer  Commonly known as: DUO-NEB   3 mL, Nebulization, 4 Times Daily PRN      ketoconazole 2 % shampoo  Commonly known as: NIZORAL   Apply 1 application topically to the appropriate area as directed 2 (Two) Times a Week.      lisinopril 10 MG tablet  Commonly known as: PRINIVIL,ZESTRIL  Ask about: Which instructions should I use?   10 mg, Oral, Daily      LORazepam 0.5 MG tablet  Commonly known as: ATIVAN   0.5 mg, Oral, 2 Times Daily PRN      metoprolol succinate XL 50 MG 24 hr tablet  Commonly known as: TOPROL-XL   50 mg, Oral, 2 Times Daily      multivitamin with minerals tablet tablet   1 tablet, Oral, Daily      mupirocin 2 % ointment  Commonly known as: BACTROBAN   1 application, Topical, 2 Times Daily      omeprazole 20 MG capsule  Commonly known as: priLOSEC   20 mg, Oral, Daily      rOPINIRole 0.25 MG tablet  Commonly known as: REQUIP   0.25 mg, Oral, Nightly, Take 1 hour before bedtime.       sucralfate 1 g  tablet  Commonly known as: CARAFATE   Take 1 tablet by mouth 2 (Two) Times a Day.      theophylline 400 MG 24 hr tablet  Commonly known as: UNIPHYL   400 mg, Oral, Daily      traMADol 50 MG tablet  Commonly known as: ULTRAM   50 mg, Oral, 2 Times Daily             Allergies   Allergen Reactions   • Ciprofloxacin Rash         Discharge Disposition:      Diet:  Hospital:No active diet order        Discharge Activity:         CODE STATUS:  Code Status and Medical Interventions:   Ordered at: 23 [id number]     Level Of Support Discussed With:    Next of Kin (If No Surrogate)     Code Status (Patient has no pulse and is not breathing):    No CPR (Do Not Attempt to Resuscitate)     Medical Interventions (Patient has pulse or is breathing):    Comfort Measures         No future appointments.        Time spent on Discharge including face to face service: 45 minutes    Electronically signed by Doni Antunez MD, 23, 7:58 AM EDT.    Part of this note may be an electronic transcription/translation of spoken language to printed text using the Dragon Dictation System.

## 2023-04-09 NOTE — PROGRESS NOTES
Enter Query Response Below      Query Response: Unable to determine             If applicable, please update the problem list.     Patient: Laury Palacios        : 1930  Account: [id number]           Admit Date: 3/22/2023        How to Respond to this query:       a. Click New Note     b. Answer query within the yellow box.                c. Update the Problem List, if applicable.      If you have any questions about this query contact me at: [email or web address]    :    89 year admitted with bradycardia and hyperkalemia. Note DR Mejia note with cardiogenic shock. Patient with hypotension and required NS bolus, Dopamine, Vasopressin and Atropine during stay. Note cardiology DR Lopez with Junctional bradycardia.  Patient with RANDY on CDK.  Patient made comfort care and . After study can cardiogenic shock be ruled in or out?    Cardiogenic shock ruled in  Cardiogenic shock ruled out  Other______  Unable to determine      By submitting this query, we are merely seeking further clarification of documentation to accurately reflect all conditions that you are monitoring, evaluating, treating or that extend the hospitalization or utilize additional resources of care. Please utilize your independent clinical judgment when addressing the question(s) above.     This query and your response, once completed, will be entered into the legal medical record.    Sincerely,  Antonia Mendez RN CDI  Clinical Documentation Integrity Program

## (undated) DEVICE — DRSNG TELFA PAD NONADH STR 1S 3X4IN

## (undated) DEVICE — PK ENT 40

## (undated) DEVICE — SINGLE USE BIOPSY VALVE MAJ-210: Brand: SINGLE USE BIOPSY VALVE (STERILE)

## (undated) DEVICE — STERILE COTTON TIP 6IN 10PK: Brand: MEDLINE

## (undated) DEVICE — SUT GUT PLN FAST ABS 5/0 PC1 18IN 1915G

## (undated) DEVICE — EYE PAD,OVAL: Brand: CURITY

## (undated) DEVICE — 3M™ STERI-STRIP™ COMPOUND BENZOIN TINCTURE 40 BAGS/CARTON 4 CARTONS/CASE C1544: Brand: 3M™ STERI-STRIP™

## (undated) DEVICE — ELECTRD BLD EZ CLN MOD 2.5IN

## (undated) DEVICE — SWABSTK SKINPREP PVPI LF PK/3

## (undated) DEVICE — CUP SPECI 4OZ LF STRL

## (undated) DEVICE — WIPE INST MEROCEL

## (undated) DEVICE — Device: Brand: BALLOON

## (undated) DEVICE — SINGLE USE ASPIRATION NEEDLE: Brand: SINGLE USE ASPIRATION NEEDLE

## (undated) DEVICE — NDL HYPO PRECISIONGLIDE REG 25G 1 1/2

## (undated) DEVICE — CROUCH CORNEAL PROTECTOR: Brand: BAUSCH + LOMB

## (undated) DEVICE — GLV SURG BIOGEL SENSR LTX PF SZ7.5

## (undated) DEVICE — SUT VIC 4/0 P3 18IN J494G

## (undated) DEVICE — SUT SILK G3 DBL/ARM 4/0 18IN BLK

## (undated) DEVICE — Device

## (undated) DEVICE — SINGLE USE SUCTION VALVE MAJ-209: Brand: SINGLE USE SUCTION VALVE (STERILE)

## (undated) DEVICE — SUT VIC 5/0 P3 18IN J493G

## (undated) DEVICE — TRAP,MUCUS SPECIMEN,40CC: Brand: MEDLINE

## (undated) DEVICE — STPCK 1WY SPINLOCK FML LL NONDEHP LF .20ML

## (undated) DEVICE — STERILE TOOTHPICK SET: Brand: CENTURION

## (undated) DEVICE — SYR LUER SLPTP 50ML

## (undated) DEVICE — BRONCH KIT: Brand: MEDLINE INDUSTRIES, INC.

## (undated) DEVICE — DEV ATOMIZATION MUCOSAL/NASALTRACH

## (undated) DEVICE — ST NDL BRONCH ASP VIZISHOT 2 FLX 19GA